# Patient Record
Sex: FEMALE | Race: ASIAN | NOT HISPANIC OR LATINO | Employment: FULL TIME
[De-identification: names, ages, dates, MRNs, and addresses within clinical notes are randomized per-mention and may not be internally consistent; named-entity substitution may affect disease eponyms.]

---

## 2017-10-21 ENCOUNTER — HEALTH MAINTENANCE LETTER (OUTPATIENT)
Age: 22
End: 2017-10-21

## 2017-10-25 DIAGNOSIS — N92.0 MENORRHAGIA WITH REGULAR CYCLE: ICD-10-CM

## 2017-10-25 RX ORDER — ETHYNODIOL DIACETATE AND ETHINYL ESTRADIOL 1 MG-35MCG
KIT ORAL
Qty: 28 TABLET | Refills: 11 | Status: CANCELLED | OUTPATIENT
Start: 2017-10-25

## 2017-10-26 ENCOUNTER — OFFICE VISIT (OUTPATIENT)
Dept: FAMILY MEDICINE | Facility: CLINIC | Age: 22
End: 2017-10-26
Payer: COMMERCIAL

## 2017-10-26 VITALS
RESPIRATION RATE: 14 BRPM | HEART RATE: 64 BPM | SYSTOLIC BLOOD PRESSURE: 110 MMHG | TEMPERATURE: 98.1 F | BODY MASS INDEX: 30.76 KG/M2 | OXYGEN SATURATION: 100 % | WEIGHT: 196 LBS | DIASTOLIC BLOOD PRESSURE: 76 MMHG | HEIGHT: 67 IN

## 2017-10-26 DIAGNOSIS — N30.90 CYSTITIS: Primary | ICD-10-CM

## 2017-10-26 DIAGNOSIS — N92.0 MENORRHAGIA WITH REGULAR CYCLE: ICD-10-CM

## 2017-10-26 LAB
ALBUMIN UR-MCNC: ABNORMAL MG/DL
APPEARANCE UR: ABNORMAL
BACTERIA #/AREA URNS HPF: ABNORMAL /HPF
BETA HCG QUAL IFA URINE: NEGATIVE
BILIRUB UR QL STRIP: NEGATIVE
COLOR UR AUTO: YELLOW
GLUCOSE UR STRIP-MCNC: NEGATIVE MG/DL
HGB UR QL STRIP: ABNORMAL
KETONES UR STRIP-MCNC: NEGATIVE MG/DL
LEUKOCYTE ESTERASE UR QL STRIP: ABNORMAL
NITRATE UR QL: NEGATIVE
NON-SQ EPI CELLS #/AREA URNS LPF: ABNORMAL /LPF
PH UR STRIP: 7 PH (ref 5–7)
RBC #/AREA URNS AUTO: ABNORMAL /HPF
SOURCE: ABNORMAL
SP GR UR STRIP: 1.02 (ref 1–1.03)
UROBILINOGEN UR STRIP-ACNC: 0.2 EU/DL (ref 0.2–1)
WBC #/AREA URNS AUTO: >100 /HPF

## 2017-10-26 PROCEDURE — 84703 CHORIONIC GONADOTROPIN ASSAY: CPT | Performed by: FAMILY MEDICINE

## 2017-10-26 PROCEDURE — 87186 SC STD MICRODIL/AGAR DIL: CPT | Performed by: FAMILY MEDICINE

## 2017-10-26 PROCEDURE — 99214 OFFICE O/P EST MOD 30 MIN: CPT | Performed by: FAMILY MEDICINE

## 2017-10-26 PROCEDURE — 81001 URINALYSIS AUTO W/SCOPE: CPT | Performed by: FAMILY MEDICINE

## 2017-10-26 PROCEDURE — 87086 URINE CULTURE/COLONY COUNT: CPT | Performed by: FAMILY MEDICINE

## 2017-10-26 PROCEDURE — 87088 URINE BACTERIA CULTURE: CPT | Performed by: FAMILY MEDICINE

## 2017-10-26 RX ORDER — CIPROFLOXACIN 250 MG/1
250 TABLET, FILM COATED ORAL 2 TIMES DAILY
Qty: 6 TABLET | Refills: 0 | Status: SHIPPED | OUTPATIENT
Start: 2017-10-26 | End: 2017-11-13

## 2017-10-26 RX ORDER — ETHYNODIOL DIACETATE AND ETHINYL ESTRADIOL 1 MG-35MCG
1 KIT ORAL DAILY
Qty: 28 TABLET | Refills: 11 | Status: SHIPPED | OUTPATIENT
Start: 2017-10-26 | End: 2018-04-12

## 2017-10-26 ASSESSMENT — PATIENT HEALTH QUESTIONNAIRE - PHQ9: SUM OF ALL RESPONSES TO PHQ QUESTIONS 1-9: 5

## 2017-10-26 NOTE — LETTER
October 30, 2017      Ana Mosley  [address] [facility] [address]        Dear ,    We are writing to inform you of your test results.    Your test results fall within the expected range(s) or remain unchanged from previous results.  Please continue with current treatment plan.    Resulted Orders   *UA reflex to Microscopic and Culture ([place] [facility] (except [place] and [place])   Result Value Ref Range    Color Urine Yellow     Appearance Urine Cloudy     Glucose Urine Negative NEG^Negative mg/dL    Bilirubin Urine Negative NEG^Negative    Ketones Urine Negative NEG^Negative mg/dL    Specific Gravity Urine 1.020 1.003 - 1.035    Blood Urine Large (A) NEG^Negative    pH Urine 7.0 5.0 - 7.0 pH    Protein Albumin Urine Trace (A) NEG^Negative mg/dL    Urobilinogen Urine 0.2 0.2 - 1.0 EU/dL    Nitrite Urine Negative NEG^Negative    Leukocyte Esterase Urine Large (A) NEG^Negative    Source Midstream Urine    Beta HCG qual IFA urine - [facility] and [place]   Result Value Ref Range    Beta HCG Qual IFA Urine Negative NEG^Negative      Urine Microscopic   Result Value Ref Range    WBC Urine >100 (A) OTO2^O - 2 /HPF    RBC Urine 25-50 (A) OTO2^O - 2 /HPF    Squamous Epithelial /LPF Urine Few FEW^Few /LPF    Bacteria Urine Few (A) NEG^Negative /HPF   Urine Culture Aerobic Bacterial   Result Value Ref Range    Specimen Description Midstream Urine     Culture Micro 50,000 to 100,000 colonies/mL  Proteus mirabilis   (A)     Culture Micro (A)      10,000 to 50,000 colonies/mL  Strain 2  Proteus mirabilis         If you have any questions or concerns, please call the clinic at the number listed above.       Sincerely,        Yadira Flood, DO

## 2017-10-26 NOTE — MR AVS SNAPSHOT
"              After Visit Summary   10/26/2017    Ana Mosley    MRN: [id number]           Patient Information     Date Of Birth          1995        Visit Information        Provider Department      10/26/2017 10:40 AM Yadira Flood, DO [facility]        Today's Diagnoses     Cystitis    -  1    Menorrhagia with regular cycle           Follow-ups after your visit        Who to contact     If you have questions or need follow up information about today's clinic visit or your schedule please contact [facility] directly at [phone].  Normal or non-critical lab and imaging results will be communicated to you by [employer] Metechhart, letter or phone within 4 business days after the clinic has received the results. If you do not hear from us within 7 days, please contact the clinic through [employer] Metechhart or phone. If you have a critical or abnormal lab result, we will notify you by phone as soon as possible.  Submit refill requests through [email or web address] or call your pharmacy and they will forward the refill request to us. Please allow 3 business days for your refill to be completed.          Additional Information About Your Visit        MyChart Information     [email or web address] lets you send messages to your doctor, view your test results, renew your prescriptions, schedule appointments and more. To sign up, go to [email or web address] . Click on \"Log in\" on the left side of the screen, which will take you to the Welcome page. Then click on \"Sign up Now\" on the right side of the page.     You will be asked to enter the access code listed below, as well as some personal information. Please follow the directions to create your username and password.     Your access code is: [email or web address]  Expires: 2018  7:34 AM     Your access code will  in 90 days. If you need help or a new code, please call your [facility] or [phone].        Care EveryWhere ID     This is your Care EveryWhere " "ID. This could be used by other organizations to access your [facility] medical records  [id number]        Your Vitals Were     Pulse Temperature Respirations Height Pulse Oximetry BMI (Body Mass Index)    64 98.1  F (36.7  C) (Oral) 14 5' 7\" (1.702 m) 100% 30.7 kg/m2       Blood Pressure from Last 3 Encounters:   10/26/17 110/76   08/22/16 118/70   05/18/16 136/70    Weight from Last 3 Encounters:   10/26/17 196 lb (88.9 kg)   08/22/16 206 lb (93.4 kg)   05/18/16 212 lb (96.2 kg) (98 %)*     * Growth percentiles are based on CDC 2-20 Years data.              We Performed the Following     *UA reflex to Microscopic and Culture ([place] [facility] (except [place] and [place])     Beta HCG qual IFA urine - [facility] and [place]     DEPRESSION ACTION PLAN (DAP)     Urine Culture Aerobic Bacterial     Urine Microscopic          Today's Medication Changes          These changes are accurate as of: 10/26/17 11:59 PM.  If you have any questions, ask your nurse or doctor.               Start taking these medicines.        Dose/Directions    ciprofloxacin 250 MG tablet   Commonly known as:  CIPRO   Used for:  Cystitis   Started by:  Yadira Flood DO        Dose:  250 mg   Take 1 tablet (250 mg) by mouth 2 times daily   Quantity:  6 tablet   Refills:  0            Where to get your medicines      These medications [place] [facility] [id number] [place] [address]  [address] [facility] [address]     Phone:  [phone]     ciprofloxacin 250 MG tablet    ethynodiol-ethinyl estradiol 1-35 MG-MCG per tablet                Primary Care Provider Office Phone # Fax #    Susan Rachele Haase, APRN [phone] [address] [facility] [address]        Equal Access to Services     CLARA JACOBSON AH: Armando Zuluaga, wagustavo luqadaha, qaybta kaalmasadia yang, yaw kennedy. [facility] [phone].    ATENCIÓN: Si habla " español, tiene a gary disposición servicios gratuitos de asistencia lingüística. Janel deleon [phone].    We comply with applicable federal civil rights laws and [place] laws. We do not discriminate on the basis of race, color, national origin, age, disability, sex, sexual orientation, or gender identity.            Thank you!     Thank you for choosing [facility]  for your care. Our goal is always to provide you with excellent care. Hearing back from our patients is one way we can continue to improve our services. Please take a few minutes to complete the written survey that you may receive in the mail after your visit with us. Thank you!             Your Updated Medication List - Protect others around you: Learn how to safely use, store and throw away your medicines at www.disposemymeds.org.          This list is accurate as of: 10/26/17 11:59 PM.  Always use your most recent med list.                   Brand Name Dispense Instructions for use Diagnosis    adapalene 0.1 % gel    DIFFERIN    45 g    Apply topically At Bedtime    Acne, unspecified acne type       ciprofloxacin 250 MG tablet    CIPRO    6 tablet    Take 1 tablet (250 mg) by mouth 2 times daily    Cystitis       clindamycin 1 % topical gel    CLINDAMAX    60 g    Apply topically 2 times daily    Acne, unspecified acne type       ethynodiol-ethinyl estradiol 1-35 MG-MCG per tablet    KELNOR    28 tablet    Take 1 tablet by mouth daily    Menorrhagia with regular cycle

## 2017-10-26 NOTE — TELEPHONE ENCOUNTER
Last Office Visit with [facility], [facility] or [facility] prescribing provider: 8/22/2016    Next 5 appointments (look out 90 days)     Oct 26, 2017 10:40 AM CDT   SHORT with Yadira Flood DO   [facility]    [address] [place] [address] [phone]

## 2017-10-26 NOTE — PROGRESS NOTES
SUBJECTIVE:   Ana Mosley is a 22 year old female who presents to clinic today for the following health issues:    URINARY TRACT SYMPTOMS      Duration: 3 days    Description  Dysuria and hematuria    Intensity:  moderate    Accompanying signs and symptoms:  Fever/chills: no   Flank pain no   Nausea and vomiting: no   Vaginal symptoms: none  Abdominal/Pelvic Pain: YES    History  History of frequent UTI's: no   History of kidney stones: no   Sexually Active: YES  Possibility of pregnancy: No    Precipitating or alleviating factors: None    Therapies tried and outcome: none         Patient just got  and recently started sexual activity about 2 weeks ago.    LMP 10/15/17.  Sexually active on 10/23/17.  Out of OCPs for a month.  She is asking for refills today.      Problem list and histories reviewed & adjusted, as indicated.  Additional history: as documented    Patient Active Problem List   Diagnosis     Attention deficit hyperactivity disorder (ADHD)     Obesity     Iron deficiency anemia     Anxiety     Eczema     Hyperlipidemia LDL goal <160     GERD (gastroesophageal reflux disease)     Family history of diabetes mellitus     Vitamin D deficiency disease     Anxiety states     Major depressive disorder, single episode, moderate (H)     Past Surgical History:   Procedure Laterality Date     NO HISTORY OF SURGERY         Social History   Substance Use Topics     Smoking status: Never Smoker     Smokeless tobacco: Never Used     Alcohol use No     Family History   Problem Relation Age of Onset     Lipids Mother      Lipids Father      C.A.D. No family hx of              Reviewed and updated as needed this visit by clinical staff     Reviewed and updated as needed this visit by Provider         ROS:  Constitutional, HEENT, cardiovascular, pulmonary, gi and gu systems are negative, except as otherwise noted.      OBJECTIVE:   /76 (BP Location: Right arm, Patient Position: Chair, Cuff Size: Adult  "Regular)  Pulse 64  Temp 98.1  F (36.7  C) (Oral)  Resp 14  Ht 5' 7\" (1.702 m)  Wt 196 lb (88.9 kg)  SpO2 100%  BMI 30.7 kg/m2  Body mass index is 30.7 kg/(m^2).  GENERAL: healthy, alert and no distress  ABDOMEN: tenderness suprapubic, no organomegaly or masses and bowel sounds normal    Diagnostic Test Results:  Results for orders placed or performed in visit on 10/26/17   *UA reflex to Microscopic and Culture ([facility] (except [place] and [place])   Result Value Ref Range    Color Urine Yellow     Appearance Urine Cloudy     Glucose Urine Negative NEG^Negative mg/dL    Bilirubin Urine Negative NEG^Negative    Ketones Urine Negative NEG^Negative mg/dL    Specific Gravity Urine 1.020 1.003 - 1.035    Blood Urine Large (A) NEG^Negative    pH Urine 7.0 5.0 - 7.0 pH    Protein Albumin Urine Trace (A) NEG^Negative mg/dL    Urobilinogen Urine 0.2 0.2 - 1.0 EU/dL    Nitrite Urine Negative NEG^Negative    Leukocyte Esterase Urine Large (A) NEG^Negative    Source Midstream Urine    Beta HCG qual IFA urine - G and [place]   Result Value Ref Range    Beta HCG Qual IFA Urine Negative NEG^Negative      Urine Microscopic   Result Value Ref Range    WBC Urine >100 (A) OTO2^O - 2 /HPF    RBC Urine 25-50 (A) OTO2^O - 2 /HPF    Squamous Epithelial /LPF Urine Few FEW^Few /LPF    Bacteria Urine Few (A) NEG^Negative /HPF       ASSESSMENT/PLAN:     1. Cystitis  - DEPRESSION ACTION PLAN (DAP)  - *UA reflex to Microscopic and Culture ([place] [facility] (except [place] and [place])  - Beta HCG qual IFA urine - [facility] and [place]  - Urine Microscopic  - Urine Culture Aerobic Bacterial  - ciprofloxacin (CIPRO) 250 MG tablet; Take 1 tablet (250 mg) by mouth 2 times daily  Dispense: 6 tablet; Refill: 0    2. Menorrhagia with regular cycle  - ethynodiol-ethinyl estradiol (KELNOR) 1-35 MG-MCG per tablet; Take 1 tablet by mouth daily  Dispense: 28 tablet; Refill: 11  - Urine Culture Aerobic " Bacterial    Patient to follow up for physical and pap smear by the end of the year    Yadira Flood DO  [facility]

## 2017-10-26 NOTE — LETTER
[facility]  [address] [place] [address]  Phone: [phone]    October 26, 2017        Ana Mosley  [address] [facility] [address]          To whom it may concern:    RE: Ana Mosley    Patient was seen and treated today at our clinic and missed work.    Please contact me for questions or concerns.      Sincerely,        Yadira Flood, DO

## 2017-10-29 LAB
BACTERIA SPEC CULT: ABNORMAL
BACTERIA SPEC CULT: ABNORMAL
SPECIMEN SOURCE: ABNORMAL

## 2017-11-10 ENCOUNTER — OFFICE VISIT (OUTPATIENT)
Dept: FAMILY MEDICINE | Facility: CLINIC | Age: 22
End: 2017-11-10
Payer: COMMERCIAL

## 2017-11-10 VITALS
HEART RATE: 110 BPM | HEIGHT: 67 IN | DIASTOLIC BLOOD PRESSURE: 65 MMHG | BODY MASS INDEX: 30.76 KG/M2 | TEMPERATURE: 97.6 F | RESPIRATION RATE: 14 BRPM | WEIGHT: 196 LBS | SYSTOLIC BLOOD PRESSURE: 83 MMHG

## 2017-11-10 DIAGNOSIS — L03.319 CELLULITIS AND ABSCESS OF TRUNK: Primary | ICD-10-CM

## 2017-11-10 DIAGNOSIS — L02.219 CELLULITIS AND ABSCESS OF TRUNK: Primary | ICD-10-CM

## 2017-11-10 PROCEDURE — 99214 OFFICE O/P EST MOD 30 MIN: CPT | Performed by: FAMILY MEDICINE

## 2017-11-10 RX ORDER — CEPHALEXIN 500 MG/1
500 CAPSULE ORAL 4 TIMES DAILY
Qty: 40 CAPSULE | Refills: 0 | Status: SHIPPED | OUTPATIENT
Start: 2017-11-10 | End: 2018-04-12

## 2017-11-10 NOTE — LETTER
[facility]  [address] [place] [address]  [phone]        November 10, 2017    Ana Mauricio  [address] [facility] [address]              Re Ana Mauricio    Ms Mauricio has been to the doctor. Please excuse her until Tuesday, Nov 14      Sincerely,        Jomar Saucedo MD

## 2017-11-10 NOTE — MR AVS SNAPSHOT
"              After Visit Summary   11/10/2017    Ana Mauricio    MRN: [id number]           Patient Information     Date Of Birth          1995        Visit Information        Provider Department      11/10/2017 11:30 AM Jomar Saucedo MD [facility]        Today's Diagnoses     Cellulitis and abscess of trunk    -  1       Follow-ups after your visit        Your next 10 appointments already scheduled     Nov 13, 2017  2:45 PM CST   Office Visit with Jomar Saucedo MD   [facility] ([facility]    [address] [place] [address] [phone]           Bring a current list of meds and any records pertaining to this visit. For Physicals, please bring immunization records and any forms needing to be filled out. Please arrive 10 minutes early to complete paperwork.              Who to contact     If you have questions or need follow up information about today's clinic visit or your schedule please contact [facility] directly at [phone].  Normal or non-critical lab and imaging results will be communicated to you by DigitalTownhart, letter or phone within 4 business days after the clinic has received the results. If you do not hear from us within 7 days, please contact the clinic through Qubitt or phone. If you have a critical or abnormal lab result, we will notify you by phone as soon as possible.  Submit refill requests through groSolar or call your pharmacy and they will forward the refill request to us. Please allow 3 business days for your refill to be completed.          Additional Information About Your Visit        DigitalTown[employer]uno [employer] Information     groSolar lets you send messages to your doctor, view your test results, renew your prescriptions, schedule appointments and more. To sign up, go to [email or web address] . Click on \"Log in\" on the left side of the screen, which will take you to the Welcome page. Then click on " "\"Sign up Now\" on the right side of the page.     You will be asked to enter the access code listed below, as well as some personal information. Please follow the directions to create your username and password.     Your access code is: [email or web address]  Expires: 2018  6:34 AM     Your access code will  in 90 days. If you need help or a new code, please call your [facility] clinic or [phone].        Care EveryWhere ID     This is your Care EveryWhere ID. This could be used by other organizations to access your [facility] medical records  [id number]        Your Vitals Were     Pulse Temperature Respirations Height Last Period Breastfeeding?    110 97.6  F (36.4  C) (Oral) 14 5' 7\" (1.702 m) 2017 No    BMI (Body Mass Index)                   30.7 kg/m2            Blood Pressure from Last 3 Encounters:   11/10/17 (!) 83/65   10/26/17 110/76   16 118/70    Weight from Last 3 Encounters:   11/10/17 196 lb (88.9 kg)   10/26/17 196 lb (88.9 kg)   16 206 lb (93.4 kg)              Today, you had the following     No orders found for display         Today's Medication Changes          These changes are accurate as of: 11/10/17 12:30 PM.  If you have any questions, ask your nurse or doctor.               Start taking these medicines.        Dose/Directions    cephALEXin 500 MG capsule   Commonly known as:  KEFLEX   Used for:  Cellulitis and abscess of trunk   Started by:  Jomar Saucedo MD        Dose:  500 mg   Take 1 capsule (500 mg) by mouth 4 times daily   Quantity:  40 capsule   Refills:  0            Where to get your medicines      These medications were [facility] [place] [facility] [address]  [address] [place] [address]     Phone:  [phone]     cephALEXin 500 MG capsule                Primary Care Provider Office Phone # Fax #    Susan Rachele Haase, APRN -[phone]  [address]        Equal " Access to Services     [facility]: Hadii jillian osuna ovi Zuluaga, warebeccada luqadaha, qaybta kacruzyaw bryant. So [facility] [phone].    ATENCIÓN: Si habla español, tiene a gary disposición servicios gratuitos de asistencia lingüística. Llame al [phone].    We comply with applicable federal civil rights laws and [place] laws. We do not discriminate on the basis of race, color, national origin, age, disability, sex, sexual orientation, or gender identity.            Thank you!     Thank you for choosing [facility]  for your care. Our goal is always to provide you with excellent care. Hearing back from our patients is one way we can continue to improve our services. Please take a few minutes to complete the written survey that you may receive in the mail after your visit with us. Thank you!             Your Updated Medication List - Protect others around you: Learn how to safely use, store and throw away your medicines at www.disposemymeds.org.          This list is accurate as of: 11/10/17 12:30 PM.  Always use your most recent med list.                   Brand Name Dispense Instructions for use Diagnosis    adapalene 0.1 % gel    DIFFERIN    45 g    Apply topically At Bedtime    Acne, unspecified acne type       cephALEXin 500 MG capsule    KEFLEX    40 capsule    Take 1 capsule (500 mg) by mouth 4 times daily    Cellulitis and abscess of trunk       ciprofloxacin 250 MG tablet    CIPRO    6 tablet    Take 1 tablet (250 mg) by mouth 2 times daily    Cystitis       clindamycin 1 % topical gel    CLINDAMAX    60 g    Apply topically 2 times daily    Acne, unspecified acne type       ethynodiol-ethinyl estradiol 1-35 MG-MCG per tablet    KELNOR    28 tablet    Take 1 tablet by mouth daily    Menorrhagia with regular cycle

## 2017-11-10 NOTE — NURSING NOTE
"Chief Complaint   Patient presents with     Derm Problem     Boil on stomach and fever       Initial Ht 5' 7\" (1.702 m)  Breastfeeding? No Estimated body mass index is 30.7 kg/(m^2) as calculated from the following:    Height as of 10/26/17: 5' 7\" (1.702 m).    Weight as of 10/26/17: 196 lb (88.9 kg).  Medication Reconciliation: complete rt arm Haley Villagran MA      "

## 2017-11-10 NOTE — PROGRESS NOTES
"  SUBJECTIVE:   Ana Mauricio is a 22 year old female who presents to clinic today for the following health issues:    Cellulitis and abscess of trunk  (primary encounter diagnosis): The patient states that she first noticed what she thought was\" body acne\" on her abdomen on Tuesday and popped it. Wednesday it got bigger and now the area is red and painful.  She's never had this before.    Problem list and histories reviewed & adjusted, as indicated.  Additional history: as documented    Reviewed and updated as needed this visit by clinical staff  Tobacco  Allergies  Meds        Past Medical History:   Diagnosis Date     Attention deficit disorder without mention of hyperactivity     not on meds now, tried several     Hyperlipidemia LDL goal <160 12/31/2011    recent cystitis symptoms have resolved with treatment    Past Surgical History:   Procedure Laterality Date     NO HISTORY OF SURGERY         Family History   Problem Relation Age of Onset     Lipids Mother      Lipids Father      C.A.D. No family hx of        Social History   Substance Use Topics     Smoking status: Never Smoker     Smokeless tobacco: Never Used     Alcohol use No       Reviewed and updated as needed this visit by Provider         ROS:  No fever no GI or  symptoms    OBJECTIVE:     BP (!) 83/65 (BP Location: Right arm, Patient Position: Chair, Cuff Size: Adult Regular)  Pulse 110  Temp 97.6  F (36.4  C) (Oral)  Resp 14  Ht 1.702 m (5' 7\")  Wt 88.9 kg (196 lb)  LMP 11/05/2017  Breastfeeding? No  BMI 30.7 kg/m2  Body mass index is 30.7 kg/(m^2).    Exam  Right lower quadrant of abdomen with large subcutaneous induration. In the center of this is what appears to be a 1 cm area of desquamation. There is no fluctuance this is palpably tender      ASSESSMENT/PLAN:   ASSESSMENT / PLAN:  (L03.319,  L02.219) Cellulitis and abscess of trunk  (primary encounter diagnosis)  Comment: I suspect an underlying sebaceous cyst was initiating " cause  Plan: cephALEXin (KEFLEX) 500 MG capsule warm soaks    RTC 3 days, anticipate development of central liquefaction to be drained. TD Up-to-date    The information in this document, created by the medical scribe for me, accurately reflects the services I personally performed and the decisions made by me. I have reviewed and approved this document for accuracy prior to leaving the patient care area.  Jomar Saucedo MD November 10, 2017 11:45 AM    Jomar Saucedo MD  [facility]

## 2017-11-12 ENCOUNTER — TELEPHONE (OUTPATIENT)
Dept: FAMILY MEDICINE | Facility: CLINIC | Age: 22
End: 2017-11-12

## 2017-11-12 NOTE — TELEPHONE ENCOUNTER
Clinic Action Needed: No/FYI  Reason for Call: Ana was seen in clinic on November 10th and dx with cellulitis and abscess of trunk and possible sebaceous cyst.  She was started on Keflex 500 mg QID and was to return to clinic for possible draining of cyst on Monday.  Early this morning she awoke to find that the abscess/cyst appears to have burst and is draining a serosanguineous fluid.  She denies increased, pain, advancing redness or fever, however reports that the skin where abscess is turning black. Paged on call provider for [facility] to speak to me at [facility].  Dr. Flood is on call, page sent @ 8:34 am. 2ND page sent @ 8:46 am. Dr. Flood advised that she should keep appointment for tomorrow, get gauze dressing to put over draining area to soak up fluid, warm pack to encourage draining, stay on abx as prescribed.  If the area becomes more painful, advancing redness or develops fever get into UC today.  Called Ana with instructions from Dr. Flood, she appears to understand directives and agrees with plan.    Routed to: KASEY Gaona, RN  [facility]

## 2017-11-13 ENCOUNTER — OFFICE VISIT (OUTPATIENT)
Dept: FAMILY MEDICINE | Facility: CLINIC | Age: 22
End: 2017-11-13
Payer: COMMERCIAL

## 2017-11-13 VITALS
SYSTOLIC BLOOD PRESSURE: 111 MMHG | TEMPERATURE: 98.2 F | HEART RATE: 80 BPM | RESPIRATION RATE: 18 BRPM | BODY MASS INDEX: 30.45 KG/M2 | HEIGHT: 67 IN | DIASTOLIC BLOOD PRESSURE: 76 MMHG | WEIGHT: 194 LBS

## 2017-11-13 DIAGNOSIS — L03.319 CELLULITIS AND ABSCESS OF TRUNK: Primary | ICD-10-CM

## 2017-11-13 DIAGNOSIS — L02.219 CELLULITIS AND ABSCESS OF TRUNK: Primary | ICD-10-CM

## 2017-11-13 DIAGNOSIS — L72.3 SEBACEOUS CYST: ICD-10-CM

## 2017-11-13 PROCEDURE — 99213 OFFICE O/P EST LOW 20 MIN: CPT | Performed by: FAMILY MEDICINE

## 2017-11-13 RX ORDER — HYDROCODONE BITARTRATE AND ACETAMINOPHEN 5; 325 MG/1; MG/1
1 TABLET ORAL EVERY 6 HOURS PRN
Qty: 12 TABLET | Refills: 0 | Status: SHIPPED | OUTPATIENT
Start: 2017-11-13 | End: 2017-12-16

## 2017-11-13 NOTE — PROGRESS NOTES
"  SUBJECTIVE:   Ana Mauricio is a 22 year old female who presents to clinic today for the following health issues:      The patient says the cyst \" popped \" yesterday.      Problem list and histories reviewed & adjusted, as indicated.  Additional history: as documented        Reviewed and updated as needed this visit by clinical staff  Tobacco  Allergies  Meds  Med Hx  Surg Hx  Fam Hx  Soc Hx       Past Medical History:   Diagnosis Date     Attention deficit disorder without mention of hyperactivity     not on meds now, tried several     Cellulitis and abscess of trunk 11/10/2017     Hyperlipidemia LDL goal <160 12/31/2011       Past Surgical History:   Procedure Laterality Date     NO HISTORY OF SURGERY         Family History   Problem Relation Age of Onset     Lipids Mother      Lipids Father      C.A.D. No family hx of        Social History   Substance Use Topics     Smoking status: Never Smoker     Smokeless tobacco: Never Used     Alcohol use No       Reviewed and updated as needed this visit by Provider         ROS: No fevers    This document serves as a record of the services and decisions personally performed and made by Jomar Saucedo MD. It was created on his behalf by Tyra Heard, a trained medical scribe.  The creation of this document is based on the scribe's personal observations and the provider's statements to the medical scribe.  Tyra Heard, November 13, 2017 3:05 PM    OBJECTIVE:     /76 (BP Location: Right arm, Patient Position: Chair, Cuff Size: Adult Large)  Pulse 80  Temp 98.2  F (36.8  C) (Oral)  Resp 18  Ht 1.702 m (5' 7\")  Wt 88 kg (194 lb)  LMP 11/05/2017  BMI 30.38 kg/m2  Body mass index is 30.38 kg/(m^2).    Skin: Induration RLQ abdomen with central hole, draining liquified sebaceous material    Procedure:     Lesion has spontaneously drained, skin anesthetized with marcaine, then liquefied typical sebaceous material removed. Wound packed " dressed      ASSESSMENT/PLAN:     ASSESSMENT / PLAN:  (L03.319,  L02.219) Cellulitis and abscess of trunk  (primary encounter diagnosis)  Comment: *improved  Plan: HYDROcodone-acetaminophen (NORCO) 5-325 MG per         Tablet. Wound care discussed           (L72.3) Sebaceous cyst  Comment: yonatan history discussed  Plan:       RTC as needed, and upon recurrence    Jomar Saucedo MD                  The information in this document, created by the medical scribe for me, accurately reflects the services I personally performed and the decisions made by me. I have reviewed and approved this document for accuracy prior to leaving the patient care area.  Jomar Saucedo MD November 13, 2017 3:05 PM    Jomar Saucedo MD  [facility]

## 2017-11-13 NOTE — NURSING NOTE
"Chief Complaint   Patient presents with     Derm Problem     Fu on RLQ boil        Initial /76 (BP Location: Right arm, Patient Position: Chair, Cuff Size: Adult Large)  Pulse 80  Temp 98.2  F (36.8  C) (Oral)  Resp 18  Ht 5' 7\" (1.702 m)  Wt 194 lb (88 kg)  LMP 11/05/2017  BMI 30.38 kg/m2 Estimated body mass index is 30.38 kg/(m^2) as calculated from the following:    Height as of this encounter: 5' 7\" (1.702 m).    Weight as of this encounter: 194 lb (88 kg).  Medication Reconciliation: complete rt arm Haley Villagran MA        "

## 2017-11-13 NOTE — MR AVS SNAPSHOT
"              After Visit Summary   2017    Ana Mauricio    MRN: [id number]           Patient Information     Date Of Birth          1995        Visit Information        Provider Department      2017 2:45 PM Jomar Saucedo MD [facility]        Today's Diagnoses     Cellulitis and abscess of trunk    -  1    Sebaceous cyst           Follow-ups after your visit        Who to contact     If you have questions or need follow up information about today's clinic visit or your schedule please contact [facility] directly at [phone].  Normal or non-critical lab and imaging results will be communicated to you by Trippy Bandzhart, letter or phone within 4 business days after the clinic has received the results. If you do not hear from us within 7 days, please contact the clinic through Trippy Bandzhart or phone. If you have a critical or abnormal lab result, we will notify you by phone as soon as possible.  Submit refill requests through XMarket or call your pharmacy and they will forward the refill request to us. Please allow 3 business days for your refill to be completed.          Additional Information About Your Visit        MyChart Information     XMarket lets you send messages to your doctor, view your test results, renew your prescriptions, schedule appointments and more. To sign up, go to [email or web address] . Click on \"Log in\" on the left side of the screen, which will take you to the Welcome page. Then click on \"Sign up Now\" on the right side of the page.     You will be asked to enter the access code listed below, as well as some personal information. Please follow the directions to create your username and password.     Your access code is: [email or web address]  Expires: 2018  6:34 AM     Your access code will  in 90 days. If you need help or a new code, please call your [facility] or [phone].        Care EveryWhere ID     This is your Care EveryWhere " "ID. This could be used by other organizations to access your [facility] medical records  [id number]        Your Vitals Were     Pulse Temperature Respirations Height Last Period BMI (Body Mass Index)    80 98.2  F (36.8  C) (Oral) 18 5' 7\" (1.702 m) 11/05/2017 30.38 kg/m2       Blood Pressure from Last 3 Encounters:   11/13/17 111/76   11/10/17 (!) 83/65   10/26/17 110/76    Weight from Last 3 Encounters:   11/13/17 194 lb (88 kg)   11/10/17 196 lb (88.9 kg)   10/26/17 196 lb (88.9 kg)              Today, you had the following     No orders found for display         Today's Medication Changes          These changes are accurate as of: 11/13/17 11:59 PM.  If you have any questions, ask your nurse or doctor.               Start taking these medicines.        Dose/Directions    HYDROcodone-acetaminophen 5-325 MG per tablet   Commonly known as:  NORCO   Used for:  Cellulitis and abscess of trunk   Started by:  Jomar Saucedo MD        Dose:  1 tablet   Take 1 tablet by mouth every 6 hours as needed for moderate to severe pain   Quantity:  12 tablet   Refills:  0            Where to get your medicines      Some of these will need a paper prescription and others can be bought over the counter.  Ask your nurse if you have questions.     Bring a paper prescription for each of these medications     HYDROcodone-acetaminophen 5-325 MG per tablet                Primary Care Provider Office Phone # Fax #    Maggie Rachele Haase, APRN [phone] [address] [facility] [address]        Equal Access to Services     [facility]: Hadii jillian osuna hadasho Soomaali, waaxda luqadaha, qaybta kaalmada adeeglaurie, yaw idiin hayaan adeeg kharash la'aan . So [facility] [phone].    ATENCIÓN: Si habla español, tiene a gary disposición servicios gratuitos de asistencia lingüística. Llame al [phone].    We comply with applicable federal civil rights laws and [place] laws. We do not discriminate on the basis of " race, color, national origin, age, disability, sex, sexual orientation, or gender identity.            Thank you!     Thank you for choosing [facility]  for your care. Our goal is always to provide you with excellent care. Hearing back from our patients is one way we can continue to improve our services. Please take a few minutes to complete the written survey that you may receive in the mail after your visit with us. Thank you!             Your Updated Medication List - Protect others around you: Learn how to safely use, store and throw away your medicines at www.disposemymeds.org.          This list is accurate as of: 11/13/17 11:59 PM.  Always use your most recent med list.                   Brand Name Dispense Instructions for use Diagnosis    adapalene 0.1 % gel    DIFFERIN    45 g    Apply topically At Bedtime    Acne, unspecified acne type       cephALEXin 500 MG capsule    KEFLEX    40 capsule    Take 1 capsule (500 mg) by mouth 4 times daily    Cellulitis and abscess of trunk       clindamycin 1 % topical gel    CLINDAMAX    60 g    Apply topically 2 times daily    Acne, unspecified acne type       ethynodiol-ethinyl estradiol 1-35 MG-MCG per tablet    KELNOR    28 tablet    Take 1 tablet by mouth daily    Menorrhagia with regular cycle       HYDROcodone-acetaminophen 5-325 MG per tablet    NORCO    12 tablet    Take 1 tablet by mouth every 6 hours as needed for moderate to severe pain    Cellulitis and abscess of trunk

## 2017-12-16 ENCOUNTER — OFFICE VISIT (OUTPATIENT)
Dept: FAMILY MEDICINE | Facility: CLINIC | Age: 22
End: 2017-12-16
Payer: COMMERCIAL

## 2017-12-16 VITALS
DIASTOLIC BLOOD PRESSURE: 66 MMHG | OXYGEN SATURATION: 98 % | HEART RATE: 83 BPM | SYSTOLIC BLOOD PRESSURE: 110 MMHG | WEIGHT: 196 LBS | RESPIRATION RATE: 18 BRPM | HEIGHT: 67 IN | TEMPERATURE: 97.8 F | BODY MASS INDEX: 30.76 KG/M2

## 2017-12-16 DIAGNOSIS — Z11.3 SCREEN FOR STD (SEXUALLY TRANSMITTED DISEASE): ICD-10-CM

## 2017-12-16 DIAGNOSIS — D50.9 IRON DEFICIENCY ANEMIA, UNSPECIFIED IRON DEFICIENCY ANEMIA TYPE: ICD-10-CM

## 2017-12-16 DIAGNOSIS — E78.5 HYPERLIPIDEMIA LDL GOAL <160: ICD-10-CM

## 2017-12-16 DIAGNOSIS — Z00.00 ROUTINE GENERAL MEDICAL EXAMINATION AT A HEALTH CARE FACILITY: Primary | ICD-10-CM

## 2017-12-16 DIAGNOSIS — E55.9 VITAMIN D DEFICIENCY DISEASE: ICD-10-CM

## 2017-12-16 DIAGNOSIS — F32.1 MAJOR DEPRESSIVE DISORDER, SINGLE EPISODE, MODERATE (H): ICD-10-CM

## 2017-12-16 DIAGNOSIS — Z11.51 SPECIAL SCREENING EXAMINATION FOR HUMAN PAPILLOMAVIRUS (HPV): ICD-10-CM

## 2017-12-16 DIAGNOSIS — Z83.3 FAMILY HISTORY OF DIABETES MELLITUS: ICD-10-CM

## 2017-12-16 LAB
ALBUMIN SERPL-MCNC: 3.9 G/DL (ref 3.4–5)
ALP SERPL-CCNC: 95 U/L (ref 40–150)
ALT SERPL W P-5'-P-CCNC: 27 U/L (ref 0–50)
ANION GAP SERPL CALCULATED.3IONS-SCNC: 7 MMOL/L (ref 3–14)
AST SERPL W P-5'-P-CCNC: 23 U/L (ref 0–45)
BILIRUB SERPL-MCNC: 0.3 MG/DL (ref 0.2–1.3)
BUN SERPL-MCNC: 10 MG/DL (ref 7–30)
CALCIUM SERPL-MCNC: 9.2 MG/DL (ref 8.5–10.1)
CHLORIDE SERPL-SCNC: 104 MMOL/L (ref 94–109)
CHOLEST SERPL-MCNC: 231 MG/DL
CO2 SERPL-SCNC: 27 MMOL/L (ref 20–32)
CREAT SERPL-MCNC: 0.66 MG/DL (ref 0.52–1.04)
ERYTHROCYTE [DISTWIDTH] IN BLOOD BY AUTOMATED COUNT: 15.6 % (ref 10–15)
GFR SERPL CREATININE-BSD FRML MDRD: >90 ML/MIN/1.7M2
GLUCOSE SERPL-MCNC: 80 MG/DL (ref 70–99)
HCT VFR BLD AUTO: 40.4 % (ref 35–47)
HDLC SERPL-MCNC: 52 MG/DL
HGB BLD-MCNC: 12.6 G/DL (ref 11.7–15.7)
LDLC SERPL CALC-MCNC: 162 MG/DL
MCH RBC QN AUTO: 24.5 PG (ref 26.5–33)
MCHC RBC AUTO-ENTMCNC: 31.2 G/DL (ref 31.5–36.5)
MCV RBC AUTO: 79 FL (ref 78–100)
NONHDLC SERPL-MCNC: 179 MG/DL
PLATELET # BLD AUTO: 309 10E9/L (ref 150–450)
POTASSIUM SERPL-SCNC: 3.9 MMOL/L (ref 3.4–5.3)
PROT SERPL-MCNC: 7.9 G/DL (ref 6.8–8.8)
RBC # BLD AUTO: 5.14 10E12/L (ref 3.8–5.2)
SODIUM SERPL-SCNC: 138 MMOL/L (ref 133–144)
TRIGL SERPL-MCNC: 86 MG/DL
TSH SERPL DL<=0.005 MIU/L-ACNC: 2.48 MU/L (ref 0.4–4)
WBC # BLD AUTO: 6.4 10E9/L (ref 4–11)

## 2017-12-16 PROCEDURE — 99395 PREV VISIT EST AGE 18-39: CPT | Performed by: PHYSICIAN ASSISTANT

## 2017-12-16 PROCEDURE — 85027 COMPLETE CBC AUTOMATED: CPT | Performed by: PHYSICIAN ASSISTANT

## 2017-12-16 PROCEDURE — G0145 SCR C/V CYTO,THINLAYER,RESCR: HCPCS | Performed by: PHYSICIAN ASSISTANT

## 2017-12-16 PROCEDURE — 36415 COLL VENOUS BLD VENIPUNCTURE: CPT | Performed by: PHYSICIAN ASSISTANT

## 2017-12-16 PROCEDURE — 87491 CHLMYD TRACH DNA AMP PROBE: CPT | Performed by: PHYSICIAN ASSISTANT

## 2017-12-16 PROCEDURE — 84443 ASSAY THYROID STIM HORMONE: CPT | Performed by: PHYSICIAN ASSISTANT

## 2017-12-16 PROCEDURE — 80061 LIPID PANEL: CPT | Performed by: PHYSICIAN ASSISTANT

## 2017-12-16 PROCEDURE — 82306 VITAMIN D 25 HYDROXY: CPT | Performed by: PHYSICIAN ASSISTANT

## 2017-12-16 PROCEDURE — 87591 N.GONORRHOEAE DNA AMP PROB: CPT | Performed by: PHYSICIAN ASSISTANT

## 2017-12-16 PROCEDURE — 80053 COMPREHEN METABOLIC PANEL: CPT | Performed by: PHYSICIAN ASSISTANT

## 2017-12-16 NOTE — LETTER
December 29, 2017      Ana ARCHANA Mauricio  [address] [facility] [address]    Dear ,      I am happy to inform you that your recent cervical cancer screening test (PAP smear) was normal.      Preventative screenings such as this help to ensure your health for years to come. You should repeat a pap smear in 3 years, unless otherwise directed.      You will still need to return to the clinic every year for your annual exam and other preventive tests.     Please contact the clinic at [phone] if you have further questions.       Sincerely,      Ramona Ann Aaseby-Aguilera, PA-C/alba

## 2017-12-16 NOTE — NURSING NOTE
"Chief Complaint   Patient presents with     Physical     possible pap     Medication Request     have, but not currently using     Blood Draw     IS fasting       Initial /66 (BP Location: Right arm, Patient Position: Brian, Cuff Size: Adult Regular)  Pulse 83  Temp 97.8  F (36.6  C) (Oral)  Resp 18  Ht 5' 7\" (1.702 m)  Wt 196 lb (88.9 kg)  LMP 12/11/2017 (Approximate)  SpO2 98%  Breastfeeding? No  BMI 30.7 kg/m2 Estimated body mass index is 30.7 kg/(m^2) as calculated from the following:    Height as of this encounter: 5' 7\" (1.702 m).    Weight as of this encounter: 196 lb (88.9 kg).  Medication Reconciliation: guadalupe Still CMA      "

## 2017-12-16 NOTE — MR AVS SNAPSHOT
After Visit Summary   12/16/2017    Ana Mauricio    MRN: [id number]           Patient Information     Date Of Birth          1995        Visit Information        Provider Department      12/16/2017 8:30 AM Aaseby-Aguilera, Ramona Ann, PA-C [facility]        Today's Diagnoses     Routine general medical examination at a health care facility    -  1    Special screening examination for human papillomavirus (HPV)        Screen for STD (sexually transmitted disease)          Care Instructions      Preventive Health Recommendations  Female Ages 18 to 25     Yearly exam:     See your health care provider every year in order to  o Review health changes.   o Discuss preventive care.    o Review your medicines if your doctor has prescribed any.      You should be tested each year for STDs (sexually transmitted diseases).       After age 20, talk to your provider about how often you should have cholesterol testing.      Starting at age 21, get a Pap test every three years. If you have an abnormal result, your doctor may have you test more often.      If you are at risk for diabetes, you should have a diabetes test (fasting glucose).     Shots:     Get a flu shot each year.     Get a tetanus shot every 10 years.     Consider getting the shot (vaccine) that prevents cervical cancer (Gardasil).    Nutrition:     Eat at least 5 servings of fruits and vegetables each day.    Eat whole-grain bread, whole-wheat pasta and brown rice instead of white grains and rice.    Talk to your provider about Calcium and Vitamin D.     Lifestyle    Exercise at least 150 minutes a week each week (30 minutes a day, 5 days a week). This will help you control your weight and prevent disease.    Limit alcohol to one drink per day.    No smoking.     Wear sunscreen to prevent skin cancer.    See your dentist every six months for an exam and cleaning.          Follow-ups after your visit        Who to contact      "If you have questions or need follow up information about today's clinic visit or your schedule please contact [facility] directly at [phone].  Normal or non-critical lab and imaging results will be communicated to you by MyChart, letter or phone within 4 business days after the clinic has received the results. If you do not hear from us within 7 days, please contact the clinic through Healthy Labshart or phone. If you have a critical or abnormal lab result, we will notify you by phone as soon as possible.  Submit refill requests through Tapestry or call your pharmacy and they will forward the refill request to us. Please allow 3 business days for your refill to be completed.          Additional Information About Your Visit        Healthy LabsharGruvi Information     Tapestry lets you send messages to your doctor, view your test results, renew your prescriptions, schedule appointments and more. To sign up, go to [email or web address] . Click on \"Log in\" on the left side of the screen, which will take you to the Welcome page. Then click on \"Sign up Now\" on the right side of the page.     You will be asked to enter the access code listed below, as well as some personal information. Please follow the directions to create your username and password.     Your access code is: [email or web address]  Expires: 2018  6:34 AM     Your access code will  in 90 days. If you need help or a new code, please call your [place] clinic or [phone].        Care EveryWhere ID     This is your Care EveryWhere ID. This could be used by other organizations to access your [place] medical records  [id number]        Your Vitals Were     Pulse Temperature Respirations Height Last Period Pulse Oximetry    83 97.8  F (36.6  C) (Oral) 18 5' 7\" (1.702 m) 2017 (Approximate) 98%    Breastfeeding? BMI (Body Mass Index)                No 30.7 kg/m2           Blood Pressure from Last 3 Encounters:   17 110/66   17 111/76 "   11/10/17 (!) 83/65    Weight from Last 3 Encounters:   12/16/17 196 lb (88.9 kg)   11/13/17 194 lb (88 kg)   11/10/17 196 lb (88.9 kg)              We Performed the Following     CHLAMYDIA TRACHOMATIS PCR     NEISSERIA GONORRHOEA PCR     Pap imaged thin layer screen only - recommended age 21 - 24 years        Primary Care Provider Office Phone # Fax #    Susan Rachele Haase, APRN [phone] [address] [address] [facility] [address]        Equal Access to Services     [facility]: Hadii aad ku hadasho Soomaali, waaxda luqadaha, qaybta kaalmada adeegyada, waxpat rincon . So [facility] [phone].    ATENCIÓN: Si habla español, tiene a gary disposición servicios gratuitos de asistencia lingüística. Llame al [phone].    We comply with applicable federal civil rights laws and [place] laws. We do not discriminate on the basis of race, color, national origin, age, disability, sex, sexual orientation, or gender identity.            Thank you!     Thank you for choosing [facility]  for your care. Our goal is always to provide you with excellent care. Hearing back from our patients is one way we can continue to improve our services. Please take a few minutes to complete the written survey that you may receive in the mail after your visit with us. Thank you!             Your Updated Medication List - Protect others around you: Learn how to safely use, store and throw away your medicines at www.disposemymeds.org.          This list is accurate as of: 12/16/17  9:01 AM.  Always use your most recent med list.                   Brand Name Dispense Instructions for use Diagnosis    cephALEXin 500 MG capsule    KEFLEX    40 capsule    Take 1 capsule (500 mg) by mouth 4 times daily    Cellulitis and abscess of trunk       ethynodiol-ethinyl estradiol 1-35 MG-MCG per tablet    KELNOR    28 tablet    Take 1 tablet by mouth daily    Menorrhagia with regular cycle

## 2017-12-16 NOTE — LETTER
"                 [facility]          [address] [place] [address] [phone]      Ana ARCHANA Mauricio  [address]  [facility] [address]      Dear Ana,    The results of your recent total cholesterol test were elevated.  Your total cholesterol should be less than 200.    The primary goal of therapy is the LDL or \"bad\" cholesterol.  Your LDL level was elevated.  Your LDL goal based on risk factors (i.e. smoking, family history, high blood pressure, low HDL cholesterol) and age is 160.    Your HDL, or \"good\" cholesterol is normal.  Your goal is an HDL level above 40 if you are male and 50 if you are female triglycerides are another cholesterol component that is associated with heart disease.  Normal triglycerides are less than 150. Your triglyceride level is normal.    I would recommend: increase daily fiber intake, weight loss, increase physical activity with a goal of 150 minutes moderate exercise weekly, decrease saturated fat intake to less than 7% of total calories and repeat fasting lipids and liver tests in 12 months.    Your vitamin D level was low.  I have called in a script for vitamin D3.  Please take 1 tablet WEEKLYx 8 weeks.   After you finish that script please supplement with vitamin D3 year around at 1000 to 2000 units daily     Your hemoglobin is in normal range but I would recommend you supplement with over the counter iron supplement daily also.     The rest of your labs are within acceptable limits.  Enclosed is a copy of the results.      Thank you for choosing [facility]. We appreciate the opportunity to serve you and look forward to supporting your healthcare needs in the future.    If you have any questions or concerns, please call me or my nurse at [phone].        Sincerely,      Elissa Beasley PA-C/[facility] Team " Coordinator    Results for orders placed or performed in visit on 12/16/17   Pap imaged thin layer screen only - recommended age 21 - 24 years   Result Value Ref Range    PAP NIL     Copath Report         Patient Name: JS SALINAS  MR#: [id number]  Specimen #: [id number]  Collected: 12/16/2017  Received: 12/18/2017  Reported: 12/19/2017 13:23  Ordering Phy(s): RAMONA ANN AASEBY-AGUILERA    For improved result formatting, select 'View Enhanced Report Format'  under Linked Documents section.    SPECIMEN/STAIN PROCESS:  Pap imaged thin layer prep screening (Surepath, FocalPoint with guided  screening)       Pap-Cyto x 1    SOURCE: Cervical, endocervical  ----------------------------------------------------------------   Pap imaged thin layer prep screening (Surepath, FocalPoint with guided  screening)  SPECIMEN ADEQUACY:  Satisfactory for evaluation.  -Transformation zone component absent.    CYTOLOGIC INTERPRETATION:    Negative for intraepithelial lesion or malignancy    Electronically signed out by:  DAVON Hernández (ASCP)    Processed and screened at [facility]    CLINICAL HISTORY:  LMP: 12/11/17    Papanicolaou Test Limita tions:  Cervical cytology is a screening test  with limited sensitivity; regular screening is critical for cancer  prevention; Pap tests are primarily effective for the  diagnosis/prevention of squamous cell carcinoma, not adenocarcinomas or  other cancers.    TESTING LAB LOCATION:  [facility] [address] [place]  [address] [phone]    COLLECTION SITE:  Client:  [facility]  Location: [facility] (R)     CBC with platelets   Result Value Ref Range    WBC 6.4 4.0 - 11.0 10e9/L    RBC Count 5.14 3.8 - 5.2 10e12/L    Hemoglobin 12.6 11.7 - 15.7 g/dL    Hematocrit 40.4 35.0 - 47.0 %    MCV 79 78 - 100 fl    MCH 24.5 (L) 26.5 - 33.0 pg    MCHC 31.2 (L) 31.5 - 36.5 g/dL    RDW 15.6 (H) 10.0 - 15.0  %    Platelet Count 309 150 - 450 10e9/L   TSH with free T4 reflex   Result Value Ref Range    TSH 2.48 0.40 - 4.00 mU/L   Comprehensive metabolic panel   Result Value Ref Range    Sodium 138 133 - 144 mmol/L    Potassium 3.9 3.4 - 5.3 mmol/L    Chloride 104 94 - 109 mmol/L    Carbon Dioxide 27 20 - 32 mmol/L    Anion Gap 7 3 - 14 mmol/L    Glucose 80 70 - 99 mg/dL    Urea Nitrogen 10 7 - 30 mg/dL    Creatinine 0.66 0.52 - 1.04 mg/dL    GFR Estimate >90 >60 mL/min/1.7m2    GFR Estimate If Black >90 >60 mL/min/1.7m2    Calcium 9.2 8.5 - 10.1 mg/dL    Bilirubin Total 0.3 0.2 - 1.3 mg/dL    Albumin 3.9 3.4 - 5.0 g/dL    Protein Total 7.9 6.8 - 8.8 g/dL    Alkaline Phosphatase 95 40 - 150 U/L    ALT 27 0 - 50 U/L    AST 23 0 - 45 U/L   Lipid panel reflex to direct LDL Fasting   Result Value Ref Range    Cholesterol 231 (H) <200 mg/dL    Triglycerides 86 <150 mg/dL    HDL Cholesterol 52 >49 mg/dL    LDL Cholesterol Calculated 162 (H) <100 mg/dL    Non HDL Cholesterol 179 (H) <130 mg/dL   Vitamin D Deficiency   Result Value Ref Range    Vitamin D Deficiency screening 18 (L) 20 - 75 ug/L   NEISSERIA GONORRHOEA PCR   Result Value Ref Range    Specimen Descrip Endocervical     N Gonorrhea PCR Negative NEG^Negative   CHLAMYDIA TRACHOMATIS PCR   Result Value Ref Range    Specimen Description Endocervical     Chlamydia Trachomatis PCR Negative NEG^Negative

## 2017-12-16 NOTE — PROGRESS NOTES
SUBJECTIVE:   CC: Ana Mauricio is an 22 year old woman who presents for preventive health visit.     Physical   Annual:     Getting at least 3 servings of Calcium per day::  Yes    Bi-annual eye exam::  Yes    Dental care twice a year::  Yes    Sleep apnea or symptoms of sleep apnea::  None    Diet::  Regular (no restrictions)    Taking medications regularly::  Not Applicable    Additional concerns today::  No                      Today's PHQ-2 Score: PHQ-2 ( 1999 [employer]) 12/16/2017   Q1: Little interest or pleasure in doing things 0   Q2: Feeling down, depressed or hopeless 0   PHQ-2 Score 0   Q1: Little interest or pleasure in doing things Not at all   Q2: Feeling down, depressed or hopeless Not at all   PHQ-2 Score 0       Abuse: Current or Past(Physical, Sexual or Emotional)- NOT APPLICABLE  Do you feel safe in your environment - Yes    Social History   Substance Use Topics     Smoking status: Never Smoker     Smokeless tobacco: Never Used     Alcohol use No     Alcohol Use 12/16/2017   If you drink alcohol, do you typically have greater than 3 drinks per day OR greater than 7 drinks per week?   No       Reviewed orders with patient.  Reviewed health maintenance and updated orders accordingly - Yes  BP Readings from Last 3 Encounters:   12/16/17 110/66   11/13/17 111/76   11/10/17 (!) 83/65    Wt Readings from Last 3 Encounters:   12/16/17 196 lb (88.9 kg)   11/13/17 194 lb (88 kg)   11/10/17 196 lb (88.9 kg)                  Recent Labs   Lab Test  05/02/16   1106  12/30/13   0950  08/15/13   1007   01/17/11   1444   A1C  5.9  5.5   --    --    --    LDL  133*  131*  136*   < >  132*   HDL  45*  43*  44*   < >  44*   TRIG  115*  72  133   < >  63   ALT  29   --    --    --   11   CR  0.76  0.84   --    < >  0.74*   GFRESTIMATED  >90  Non  GFR Calc    89   --    < >  GFR not calculated, patient <16 years old.   GFRESTBLACK  >90   GFR Calc    >90   --    < >  GFR not  calculated, patient <16 years old.   POTASSIUM  4.2  4.1   --    < >  4.3   TSH  1.93  3.05   --    < >  1.21    < > = values in this interval not displayed.            Mammogram not appropriate for this patient based on age.    Pertinent mammograms are reviewed under the imaging tab.  History of abnormal Pap smear: NO - age 21-29 PAP every 3 years recommended    Reviewed and updated as needed this visit by clinical staff         Reviewed and updated as needed this visit by Provider          Review of Systems  C: NEGATIVE for fever, chills, change in weight  I: NEGATIVE for worrisome rashes, moles or lesions  E: NEGATIVE for vision changes or irritation  ENT: NEGATIVE for ear, mouth and throat problems  R: NEGATIVE for significant cough or SOB  B: NEGATIVE for masses, tenderness or discharge  CV: NEGATIVE for chest pain, palpitations or peripheral edema  GI: NEGATIVE for nausea, abdominal pain, heartburn, or change in bowel habits  : NEGATIVE for unusual urinary or vaginal symptoms. Periods are regular.  M: NEGATIVE for significant arthralgias or myalgia  N: NEGATIVE for weakness, dizziness or paresthesias  E: NEGATIVE for temperature intolerance, skin/hair changes  P: NEGATIVE for changes in mood or affect     OBJECTIVE:   There were no vitals taken for this visit.  Physical Exam  GENERAL: healthy, alert and no distress  EYES: Eyes grossly normal to inspection, PERRL and conjunctivae and sclerae normal  HENT: ear canals and TM's normal, nose and mouth without ulcers or lesions  NECK: no adenopathy, no asymmetry, masses, or scars and thyroid normal to palpation  RESP: lungs clear to auscultation - no rales, rhonchi or wheezes  BREAST: normal without masses, tenderness or nipple discharge and no palpable axillary masses or adenopathy  CV: regular rate and rhythm, normal S1 S2, no S3 or S4, no murmur, click or rub, no peripheral edema and peripheral pulses strong  ABDOMEN: soft, nontender, no hepatosplenomegaly, no  "masses and bowel sounds normal   (female): normal female external genitalia, normal urethral meatus, vaginal mucosa pink, moist, well rugated, and normal cervix/adnexa/uterus without masses or discharge  MS: no gross musculoskeletal defects noted, no edema  SKIN: no suspicious lesions or rashes  NEURO: Normal strength and tone, mentation intact and speech normal  PSYCH: mentation appears normal, affect normal/bright    ASSESSMENT/PLAN:   1. Routine general medical examination at a health care facility      2. Special screening examination for human papillomavirus (HPV)    - Pap imaged thin layer screen only - recommended age 21 - 24 years    3. Screen for STD (sexually transmitted disease)    - NEISSERIA GONORRHOEA PCR  - CHLAMYDIA TRACHOMATIS PCR    4. Vitamin D deficiency disease    - Vitamin D Deficiency    5. Hyperlipidemia LDL goal <160    - Lipid panel reflex to direct LDL Fasting    6. Iron deficiency anemia, unspecified iron deficiency anemia type    - CBC with platelets    7. Major depressive disorder, single episode, moderate (H)    - CBC with platelets  - TSH with free T4 reflex    8. Family history of diabetes mellitus    - Comprehensive metabolic panel    COUNSELING:  Reviewed preventive health counseling, as reflected in patient instructions       Regular exercise       Healthy diet/nutrition       Vision screening       Hearing screening       Contraception       Family planning         reports that she has never smoked. She has never used smokeless tobacco.    Estimated body mass index is 30.38 kg/(m^2) as calculated from the following:    Height as of 11/13/17: 5' 7\" (1.702 m).    Weight as of 11/13/17: 194 lb (88 kg).   Weight management plan: Discussed healthy diet and exercise guidelines and patient will follow up in 12 months in clinic to re-evaluate.    Counseling Resources:  ATP IV Guidelines  Pooled Cohorts Equation Calculator  Breast Cancer Risk Calculator  FRAX Risk Assessment  ICSI " Preventive Guidelines  Dietary Guidelines for Americans, 2010  USDA's MyPlate  ASA Prophylaxis  Lung CA Screening    Ramona Ann Aaseby-Aguilera, PA-C  [facility]  Answers for HPI/ROS submitted by the patient on 12/16/2017   PHQ-2 Score: 0

## 2017-12-17 LAB
C TRACH DNA SPEC QL NAA+PROBE: NEGATIVE
N GONORRHOEA DNA SPEC QL NAA+PROBE: NEGATIVE
SPECIMEN SOURCE: NORMAL
SPECIMEN SOURCE: NORMAL

## 2017-12-18 LAB — DEPRECATED CALCIDIOL+CALCIFEROL SERPL-MC: 18 UG/L (ref 20–75)

## 2017-12-19 LAB
COPATH REPORT: NORMAL
PAP: NORMAL

## 2018-01-03 ENCOUNTER — TELEPHONE (OUTPATIENT)
Dept: FAMILY MEDICINE | Facility: CLINIC | Age: 23
End: 2018-01-03

## 2018-01-03 NOTE — TELEPHONE ENCOUNTER
Patient calling requesting results of her lab results from her physical exam on 12/16/2017.  Please review and advise.      [phone] (home)     Results for orders placed or performed in visit on 12/16/17   Pap imaged thin layer screen only - recommended age 21 - 24 years   Result Value Ref Range    PAP NIL     Copath Report         Patient Name: JS SALINAS  MR#: [id number]  Specimen #: [id number]  Collected: 12/16/2017  Received: 12/18/2017  Reported: 12/19/2017 13:23  Ordering Phy(s): RAMONA ANN AASEBY-AGUILERA    For improved result formatting, select 'View Enhanced Report Format'  under Linked Documents section.    SPECIMEN/STAIN PROCESS:  Pap imaged thin layer prep screening (Surepath, FocalPoint with guided  screening)       Pap-Cyto x 1    SOURCE: Cervical, endocervical  ----------------------------------------------------------------   Pap imaged thin layer prep screening (Surepath, FocalPoint with guided  screening)  SPECIMEN ADEQUACY:  Satisfactory for evaluation.  -Transformation zone component absent.    CYTOLOGIC INTERPRETATION:    Negative for intraepithelial lesion or malignancy    Electronically signed out by:  DAVON Hernández (ASCP)    Processed and screened at [facility],  [facility]    CLINICAL HISTORY:  LMP: 12/11/17    Papanicolaou Test Limita tions:  Cervical cytology is a screening test  with limited sensitivity; regular screening is critical for cancer  prevention; Pap tests are primarily effective for the  diagnosis/prevention of squamous cell carcinoma, not adenocarcinomas or  other cancers.    TESTING LAB LOCATION:  [address] [place]  [address] [phone]    COLLECTION SITE:  Client:  [facility]  Location: [facility] (R)     CBC with platelets   Result Value Ref Range    WBC 6.4 4.0 - 11.0 10e9/L    RBC Count 5.14 3.8 - 5.2 10e12/L    Hemoglobin 12.6 11.7 - 15.7 g/dL    Hematocrit  40.4 35.0 - 47.0 %    MCV 79 78 - 100 fl    MCH 24.5 (L) 26.5 - 33.0 pg    MCHC 31.2 (L) 31.5 - 36.5 g/dL    RDW 15.6 (H) 10.0 - 15.0 %    Platelet Count 309 150 - 450 10e9/L   TSH with free T4 reflex   Result Value Ref Range    TSH 2.48 0.40 - 4.00 mU/L   Comprehensive metabolic panel   Result Value Ref Range    Sodium 138 133 - 144 mmol/L    Potassium 3.9 3.4 - 5.3 mmol/L    Chloride 104 94 - 109 mmol/L    Carbon Dioxide 27 20 - 32 mmol/L    Anion Gap 7 3 - 14 mmol/L    Glucose 80 70 - 99 mg/dL    Urea Nitrogen 10 7 - 30 mg/dL    Creatinine 0.66 0.52 - 1.04 mg/dL    GFR Estimate >90 >60 mL/min/1.7m2    GFR Estimate If Black >90 >60 mL/min/1.7m2    Calcium 9.2 8.5 - 10.1 mg/dL    Bilirubin Total 0.3 0.2 - 1.3 mg/dL    Albumin 3.9 3.4 - 5.0 g/dL    Protein Total 7.9 6.8 - 8.8 g/dL    Alkaline Phosphatase 95 40 - 150 U/L    ALT 27 0 - 50 U/L    AST 23 0 - 45 U/L   Lipid panel reflex to direct LDL Fasting   Result Value Ref Range    Cholesterol 231 (H) <200 mg/dL    Triglycerides 86 <150 mg/dL    HDL Cholesterol 52 >49 mg/dL    LDL Cholesterol Calculated 162 (H) <100 mg/dL    Non HDL Cholesterol 179 (H) <130 mg/dL   Vitamin D Deficiency   Result Value Ref Range    Vitamin D Deficiency screening 18 (L) 20 - 75 ug/L   NEISSERIA GONORRHOEA PCR   Result Value Ref Range    Specimen Descrip Endocervical     N Gonorrhea PCR Negative NEG^Negative   CHLAMYDIA TRACHOMATIS PCR   Result Value Ref Range    Specimen Description Endocervical     Chlamydia Trachomatis PCR Negative NEG^Negative     Hodan Still RN

## 2018-01-04 NOTE — PROGRESS NOTES
"Dear Ana,    It was a pleasure to see you at your recent visit.      Patient Active Problem List:     Attention deficit hyperactivity disorder (ADHD)     Obesity     Iron deficiency anemia     Anxiety     Eczema     Hyperlipidemia LDL goal <160     GERD (gastroesophageal reflux disease)     Family history of diabetes mellitus     Vitamin D deficiency disease     Anxiety states     Major depressive disorder, single episode, moderate (H)     Cellulitis and abscess of trunk        The results of your recent total cholesterol test were elevated.  Your total cholesterol should be less than 200.    The primary goal of therapy is the LDL or \"bad\" cholesterol.  Your LDL level was elevated.  Your LDL goal based on risk factors (i.e. smoking, family history, high blood pressure, low HDL cholesterol) and age is 160.    Your HDL, or \"good\" cholesterol is normal.  Your goal is an HDL level above 40 if you are male and 50 if you are female    Triglycerides are another cholesterol component that is associated with heart disease.  Normal triglycerides are less than 150.  Your   triglyceride level is normal.    I would recommend: increase daily fiber intake, weight loss, increase physical activity with a goal of 150 minutes moderate exercise weekly, decrease saturated fat intake to less than 7% of total calories and repeat fasting lipids and liver tests in 12 months.    Your vitamin D level was low.  I have called in a script for vitamin D3.  Please take 1 tablet WEEKLYx 8 weeks.   After you finish that script please supplement with vitamin D3 year around at 1000 to 2000 units daily     Your hemoglobin is in normal range but I would recommend you supplement with over the counter iron supplement daily also.     The rest of your labs are within acceptable limits :     Results for orders placed or performed in visit on 12/16/17  -Pap imaged thin layer screen only - recommended age 21 - 24 years       Result                            "                 Value                         Ref Range                       PAP                                               NIL                                                           Copath Report                                                                                                  Patient Name: JS SALINAS   MR#: [id number]   Specimen #: [id number]   Collected: 12/16/2017   Received: 12/18/2017   Reported: 12/19/2017 13:23   Ordering Phy(s): RAMONA ANN AASEBY-AGUILERA      For improved result formatting, select 'View Enhanced Report Format'   under Linked Documents section.      SPECIMEN/STAIN PROCESS:   Pap imaged thin layer prep screening (Surepath, FocalPoint with guided   screening)        Pap-Cyto x 1      SOURCE: Cervical, endocervical   ----------------------------------------------------------------    Pap imaged thin layer prep screening (Surepath, FocalPoint with guided   screening)   SPECIMEN ADEQUACY:   Satisfactory for evaluation.   -Transformation zone component absent.      CYTOLOGIC INTERPRETATION:      Negative for intraepithelial lesion or malignancy      Electronically signed out by:   DAVON Hernández (ASCP)      Processed and screened at [facility]      CLINICAL HISTORY:   LMP: 12/11/17      Papanicolaou Test Limita tions:  Cervical cytology is a screening test   with limited sensitivity; regular screening is critical for cancer   prevention; Pap tests are primarily effective for the   diagnosis/prevention of squamous cell carcinoma, not adenocarcinomas or   other cancers.      TESTING LAB LOCATION:   [facility] [address] [place]  [address] [phone]      COLLECTION SITE:   Client:  [facility]   Location: [facility] (R)     -CBC with platelets       Result                                            Value                         Ref Range                       WBC                                                6.4                           4.0 - 11.0 10e9/L               RBC Count                                         5.14                          3.8 - 5.2 10e12/L               Hemoglobin                                        12.6                          11.7 - 15.7 g/dL                Hematocrit                                        40.4                          35.0 - 47.0 %                   MCV                                               79                            78 - 100 fl                     MCH                                               24.5 (L)                      26.5 - 33.0 pg                  MCHC                                              31.2 (L)                      31.5 - 36.5 g/dL                RDW                                               15.6 (H)                      10.0 - 15.0 %                   Platelet Count                                    309                           150 - 450 10e9/L           -TSH with free T4 reflex       Result                                            Value                         Ref Range                       TSH                                               2.48                          0.40 - 4.00 mU/L           -Comprehensive metabolic panel       Result                                            Value                         Ref Range                       Sodium                                            138                           133 - 144 mmol/L                Potassium                                         3.9                           3.4 - 5.3 mmol/L                Chloride                                          104                           94 - 109 mmol/L                 Carbon Dioxide                                    27                            20 - 32 mmol/L                  Anion Gap                                         7                             3 - 14 mmol/L                    Glucose                                           80                            70 - 99 mg/dL                   Urea Nitrogen                                     10                            7 - 30 mg/dL                    Creatinine                                        0.66                          0.52 - 1.04 mg/dL               GFR Estimate                                      >90                           >60 mL/min/1.7m2                GFR Estimate If Black                             >90                           >60 mL/min/1.7m2                Calcium                                           9.2                           8.5 - 10.1 mg/dL                Bilirubin Total                                   0.3                           0.2 - 1.3 mg/dL                 Albumin                                           3.9                           3.4 - 5.0 g/dL                  Protein Total                                     7.9                           6.8 - 8.8 g/dL                  Alkaline Phosphatase                              95                            40 - 150 U/L                    ALT                                               27                            0 - 50 U/L                      AST                                               23                            0 - 45 U/L                 -Lipid panel reflex to direct LDL Fasting       Result                                            Value                         Ref Range                       Cholesterol                                       231 (H)                       <200 mg/dL                      Triglycerides                                     86                            <150 mg/dL                      HDL Cholesterol                                   52                            >49 mg/dL                       LDL Cholesterol Calculated                        162 (H)                       <100 mg/dL                       Non HDL Cholesterol                               179 (H)                       <130 mg/dL                 -Vitamin D Deficiency       Result                                            Value                         Ref Range                       Vitamin D Deficiency screening                    18 (L)                        20 - 75 ug/L               -NEISSERIA GONORRHOEA PCR       Result                                            Value                         Ref Range                       Specimen Descrip                                  Endocervical                                                  N Gonorrhea PCR                                   Negative                      NEG^Negative               -CHLAMYDIA TRACHOMATIS PCR       Result                                            Value                         Ref Range                       Specimen Description                              Endocervical                                                  Chlamydia Trachomatis PCR                         Negative                      NEG^Negative                   Thank you for choosing [facility]. We appreciate the opportunity to serve   you and look forward to supporting your healthcare needs in the future.    If you have any questions or concerns, please contact us at [phone]      Sincerely,        Ramona Aaseby-Aguilera PA-C          TEST DESCRIPTIONS   CBC (Complete Blood Coun  t) includes hemoglobin, hematocrit, white blood cells, etc. This test can be used to detect anemia, infection, and abnormalities in blood cells.   Cholesterol is one of the blood fats (lipids) and is the building block used by the body for cell wall and   hormone production. Increased levels of cholesterol have been proven to directly contribute to heart disease and strokes.   Triglycerides are one of the blood fats (lipids) and are thought to be associated with heart disease. If you have had anything to   eat  "or drink other than water for 12 hours before the test and your level is high, you should have the test repeated after a 12 hour fast. Abnormally high results may also be associated with diabetes, kidney and liver diseases.   LDL is the low density l  ipoprotein component of the cholesterol. It is the harmful substance that deposits cholesterol on the artery walls contributing to heart attacks and strokes. A high LDL level is associated with higher risk of coronary heart disease.   HDL stands for high   density lipoprotein and refers to the so-called \"good\" cholesterol. HDL picks up excess cholesterol in the bloodstream and carries it back to the liver for disposal. Individuals with higher than average HDL seem to have a lower risk of coronary disease.   Vigorous exercise will help increase the blood levels of HDL.   Risk Factor (Total cholesterol/HDL) is a commonly used ratio for cardiac risk assessment. Less than 5.0 for men and 4.4 for women is ideal.   Sodium is an electrolyte useful in diagnosis of   dehydration, diabetes, hypertension, or other diseases involving electrolyte imbalance. It also preserves the balance between calcium and potassium to maintain normal heart action and equilibrium of the body.   Potassium is also an electrolyte that work  s with sodium to regulate the body's water balance and normalize heart rhythm.   Glucose is a measure of blood sugar and is one of the tests for diabetes. If you have not been fasting, your level will often be high. A low glucose level may be a cause of   weakness or dizziness. Blood sugar ranks with cholesterol as a causative factor in arteriosclerosis and heart attacks.   BUN & Creatinine are waste products excreted by the kidneys. A high BUN can be related to a high protein diet, heavy exercise, fever   and infections, dehydration, kidney stones, and kidney disease. Creatinine elevation is less dependent on diet or exercise and better represents kidney " impairment. Low values are not generally significant.   Calcium is a mineral in the blood controlled b  y the parathyroid glands and kidneys. It is important in the formation of bone, in muscle and nerve function, and in blood clotting. Disease of the parathyroid gland, diseased bones or kidneys, or defective absorption of calcium may cause abnormal levels   from the intestine.   ALT, AST, Alk Phos are liver tests. Enzymes found in the liver as well as skeletal and cardiac muscle. Elevations can often be seen in alcoholism, liver or heart disease. Slightly abnormal values are not considered significant.   T  SH stands for Thyroid Stimulating Hormone. A sensitive test used to determine how the thyroid gland is functioning. The thyroid gland produces hormones that control the body's metabolism. When too few hormones are produced (increased TSH), hypothyroidism   occurs which can cause fatigue, sensitivity to cold, and weight gain - an overall slowing down of bodily functions. When too many thyroid hormones are produces (or decreased TSH), it creates a condition call hyperthyroidism (such as Graves' disease) whi  ch causes rapid heartbeat, weight loss, and dizziness, among other symptoms.   PSA stands for Prostate Specific Antigen. Elevated levels of PSA can increase with trauma, infection, inflammation, or disease processes in the prostate such as BPH (Benigh Pr  ostatic Hypertrophy) or cancer.   Glycohemoglobin or HgBA1C is a 3-month average of blood sugar.

## 2018-04-02 DIAGNOSIS — Z34.90 SUPERVISION OF NORMAL PREGNANCY: Primary | ICD-10-CM

## 2018-04-12 ENCOUNTER — PRENATAL OFFICE VISIT (OUTPATIENT)
Dept: NURSING | Facility: CLINIC | Age: 23
End: 2018-04-12
Payer: COMMERCIAL

## 2018-04-12 DIAGNOSIS — Z34.90 SUPERVISION OF NORMAL PREGNANCY: Primary | ICD-10-CM

## 2018-04-12 LAB
ABO + RH BLD: NORMAL
ABO + RH BLD: NORMAL
BETA HCG QUAL IFA URINE: POSITIVE
BLD GP AB SCN SERPL QL: NORMAL
BLOOD BANK CMNT PATIENT-IMP: NORMAL
ERYTHROCYTE [DISTWIDTH] IN BLOOD BY AUTOMATED COUNT: 15.8 % (ref 10–15)
HCT VFR BLD AUTO: 37.4 % (ref 35–47)
HGB BLD-MCNC: 11.7 G/DL (ref 11.7–15.7)
MCH RBC QN AUTO: 23.7 PG (ref 26.5–33)
MCHC RBC AUTO-ENTMCNC: 31.3 G/DL (ref 31.5–36.5)
MCV RBC AUTO: 76 FL (ref 78–100)
PLATELET # BLD AUTO: 301 10E9/L (ref 150–450)
RBC # BLD AUTO: 4.94 10E12/L (ref 3.8–5.2)
SPECIMEN EXP DATE BLD: NORMAL
WBC # BLD AUTO: 8.1 10E9/L (ref 4–11)

## 2018-04-12 PROCEDURE — 86780 TREPONEMA PALLIDUM: CPT | Performed by: FAMILY MEDICINE

## 2018-04-12 PROCEDURE — 87086 URINE CULTURE/COLONY COUNT: CPT | Performed by: FAMILY MEDICINE

## 2018-04-12 PROCEDURE — 87389 HIV-1 AG W/HIV-1&-2 AB AG IA: CPT | Performed by: FAMILY MEDICINE

## 2018-04-12 PROCEDURE — 36415 COLL VENOUS BLD VENIPUNCTURE: CPT | Performed by: FAMILY MEDICINE

## 2018-04-12 PROCEDURE — 85027 COMPLETE CBC AUTOMATED: CPT | Performed by: FAMILY MEDICINE

## 2018-04-12 PROCEDURE — 99207 ZZC NO CHARGE NURSE ONLY: CPT

## 2018-04-12 PROCEDURE — 86850 RBC ANTIBODY SCREEN: CPT | Performed by: FAMILY MEDICINE

## 2018-04-12 PROCEDURE — 86762 RUBELLA ANTIBODY: CPT | Performed by: FAMILY MEDICINE

## 2018-04-12 PROCEDURE — 86901 BLOOD TYPING SEROLOGIC RH(D): CPT | Performed by: FAMILY MEDICINE

## 2018-04-12 PROCEDURE — 86900 BLOOD TYPING SEROLOGIC ABO: CPT | Performed by: FAMILY MEDICINE

## 2018-04-12 PROCEDURE — 87340 HEPATITIS B SURFACE AG IA: CPT | Performed by: FAMILY MEDICINE

## 2018-04-12 PROCEDURE — 84703 CHORIONIC GONADOTROPIN ASSAY: CPT | Performed by: FAMILY MEDICINE

## 2018-04-12 RX ORDER — PRENATAL VIT/IRON FUM/FOLIC AC 27MG-0.8MG
1 TABLET ORAL DAILY
Qty: 100 TABLET | Refills: 3 | COMMUNITY
Start: 2018-04-12 | End: 2018-06-07 | Stop reason: ALTCHOICE

## 2018-04-12 NOTE — MR AVS SNAPSHOT
After Visit Summary   4/12/2018    Ana Mauricio    MRN: [id number]           Patient Information     Date Of Birth          1995        Visit Information        Provider Department      4/12/2018 9:00 AM RI PRENATAL NURSE [facility]        Today's Diagnoses     Supervision of normal pregnancy    -  1       Follow-ups after your visit        Your next 10 appointments already scheduled     May 03, 2018  2:00 PM CDT   US OB < 14 WEEKS WITH TRANSVAGINAL SINGLE with RIUS1   [facility]    [address]  [facility] [id number] [phone]           Please bring a list of your medicines (including vitamins, minerals and over-the-counter drugs). Also, tell your doctor about any allergies you may have. Wear comfortable clothes and leave your valuables at home.  If you re less than 20 weeks drink four 8-ounce glasses of fluid an hour before your exam. If you need to empty your bladder before your exam, try to release only a little urine. Then, drink another glass of fluid.  You may have up to two family members in the exam room. If you bring a small child, an adult must be there to care for him or her.  Please call the Imaging Department at your exam site with any questions.            May 08, 2018  3:45 PM CDT   New Prenatal with Karen Ledezma MD   [facility]    [address] [place] [id number] [phone]              Future tests that were ordered for you today     Open Future Orders        Priority Expected Expires Ordered    US OB <14 Weeks w Transvaginal Single Routine  4/12/2019 4/12/2018            Who to contact     If you have questions or need follow up information about today's clinic visit or your schedule please contact [facility] directly at [phone].  Normal or non-critical lab and imaging results will be  "communicated to you by MyChart, letter or phone within 4 business days after the clinic has received the results. If you do not hear from us within 7 days, please contact the clinic through Emailage or phone. If you have a critical or abnormal lab result, we will notify you by phone as soon as possible.  Submit refill requests through Emailage or call your pharmacy and they will forward the refill request to us. Please allow 3 business days for your refill to be completed.          Additional Information About Your Visit        Emailage Information     Emailage lets you send messages to your doctor, view your test results, renew your prescriptions, schedule appointments and more. To sign up, go to [email or web address] . Click on \"Log in\" on the left side of the screen, which will take you to the Welcome page. Then click on \"Sign up Now\" on the right side of the page.     You will be asked to enter the access code listed below, as well as some personal information. Please follow the directions to create your username and password.     Your access code is: [email or web address]  Expires: 2018  9:58 AM     Your access code will  in 90 days. If you need help or a new code, please call your [place] clinic or [phone].        Care EveryWhere ID     This is your Care EveryWhere ID. This could be used by other organizations to access your [place] medical records  [id number]        Your Vitals Were     Last Period                   2018            Blood Pressure from Last 3 Encounters:   17 110/66   17 111/76   11/10/17 (!) 83/65    Weight from Last 3 Encounters:   17 196 lb (88.9 kg)   17 194 lb (88 kg)   11/10/17 196 lb (88.9 kg)              We Performed the Following     ABO/Rh type and screen     Anti Treponema     Beta HCG qual IFA urine - [facility] and [place]     CBC with platelets     Hepatitis B surface antigen     HIV Antigen Antibody Combo     Rubella Antibody IgG Quantitative "     Urine Culture Aerobic Bacterial        Primary Care Provider Office Phone # Fax #    Susan Rachele Haase, APRN -[phone] [address] [facility] [address]        Equal Access to Services     CLARA JACOBSON : Hadii aad ku hadmeñoo Soelaineali, waaxda luqadaha, qaybta kaalmada adeegyada, waxpat idiin hayaan elle smith sergey kennedy. So [facility] [phone].    ATENCIÓN: Si habla español, tiene a gary disposición servicios gratuitos de asistencia lingüística. Llame al [phone].    We comply with applicable federal civil rights laws and [place] laws. We do not discriminate on the basis of race, color, national origin, age, disability, sex, sexual orientation, or gender identity.            Thank you!     Thank you for choosing [facility]  for your care. Our goal is always to provide you with excellent care. Hearing back from our patients is one way we can continue to improve our services. Please take a few minutes to complete the written survey that you may receive in the mail after your visit with us. Thank you!             Your Updated Medication List - Protect others around you: Learn how to safely use, store and throw away your medicines at www.disposemymeds.org.          This list is accurate as of 4/12/18  9:58 AM.  Always use your most recent med list.                   Brand Name Dispense Instructions for use Diagnosis    prenatal multivitamin plus iron 27-0.8 MG Tabs per tablet     100 tablet    Take 1 tablet by mouth daily

## 2018-04-12 NOTE — NURSING NOTE
NPN nurse visit. 1st dr visit scheduled for 5/8/18 with Karen Ledezma M.D. Pap not due. Last pap 12/2017.  7w0d    SUSAN Flores RN

## 2018-04-13 LAB
BACTERIA SPEC CULT: NORMAL
HBV SURFACE AG SERPL QL IA: NONREACTIVE
HIV 1+2 AB+HIV1 P24 AG SERPL QL IA: NONREACTIVE
RUBV IGG SERPL IA-ACNC: 33 IU/ML
SPECIMEN SOURCE: NORMAL
T PALLIDUM IGG+IGM SER QL: NEGATIVE

## 2018-05-03 ENCOUNTER — RADIANT APPOINTMENT (OUTPATIENT)
Dept: ULTRASOUND IMAGING | Facility: CLINIC | Age: 23
End: 2018-05-03
Attending: OBSTETRICS & GYNECOLOGY
Payer: COMMERCIAL

## 2018-05-03 DIAGNOSIS — Z34.90 SUPERVISION OF NORMAL PREGNANCY: ICD-10-CM

## 2018-05-03 PROCEDURE — 76815 OB US LIMITED FETUS(S): CPT | Performed by: OBSTETRICS & GYNECOLOGY

## 2018-05-03 PROCEDURE — 76817 TRANSVAGINAL US OBSTETRIC: CPT | Performed by: OBSTETRICS & GYNECOLOGY

## 2018-05-08 ENCOUNTER — PRENATAL OFFICE VISIT (OUTPATIENT)
Dept: OBGYN | Facility: CLINIC | Age: 23
End: 2018-05-08
Payer: COMMERCIAL

## 2018-05-08 VITALS
BODY MASS INDEX: 32.73 KG/M2 | DIASTOLIC BLOOD PRESSURE: 62 MMHG | HEART RATE: 80 BPM | WEIGHT: 209 LBS | SYSTOLIC BLOOD PRESSURE: 120 MMHG

## 2018-05-08 DIAGNOSIS — E66.811 CLASS 1 OBESITY DUE TO EXCESS CALORIES WITH SERIOUS COMORBIDITY IN ADULT, UNSPECIFIED BMI: ICD-10-CM

## 2018-05-08 DIAGNOSIS — Z34.01 ENCOUNTER FOR SUPERVISION OF NORMAL FIRST PREGNANCY IN FIRST TRIMESTER: Primary | ICD-10-CM

## 2018-05-08 DIAGNOSIS — E66.09 CLASS 1 OBESITY DUE TO EXCESS CALORIES WITH SERIOUS COMORBIDITY IN ADULT, UNSPECIFIED BMI: ICD-10-CM

## 2018-05-08 LAB — HBA1C MFR BLD: 5.5 % (ref 0–5.6)

## 2018-05-08 PROCEDURE — 36415 COLL VENOUS BLD VENIPUNCTURE: CPT | Performed by: OBSTETRICS & GYNECOLOGY

## 2018-05-08 PROCEDURE — 99207 ZZC FIRST OB VISIT: CPT | Performed by: OBSTETRICS & GYNECOLOGY

## 2018-05-08 PROCEDURE — 87591 N.GONORRHOEAE DNA AMP PROB: CPT | Performed by: OBSTETRICS & GYNECOLOGY

## 2018-05-08 PROCEDURE — 83036 HEMOGLOBIN GLYCOSYLATED A1C: CPT | Performed by: OBSTETRICS & GYNECOLOGY

## 2018-05-08 PROCEDURE — 87491 CHLMYD TRACH DNA AMP PROBE: CPT | Performed by: OBSTETRICS & GYNECOLOGY

## 2018-05-08 NOTE — LETTER
[place] [address]  [facility] [address]  Phone: [phone]    05/08/18    Ana Mauricio  [address] [facility] [address]      To whom it may concern:     nAa Mauricio is a patient under my care. She is currently pregnant with an estimated due date of 11/29/18.     If you have any questions please do not hesitate to contact our clinic at [phone].    Sincerely,      Karen Ledezma MD

## 2018-05-08 NOTE — PROGRESS NOTES
New OB Visit  Ana Mauricio   May 8, 2018   10w5d     Subjective: Ana Mauricio 22 year old  at 10w5d dated by LMP c/w 10w US here today for initial OB visit. Patient reports Nausea with emesis each morning. Denies cramping and vaginal spotting. Eats every 4 hours.     Gyn History:   Menses: LMP: Patient's last menstrual period was 2018. frequency: q month  Sexually transmitted disease history: none.    Occupation: [employer]  Exercise: minimal    Since her last LMP she denies use of alcohol, tobacco and street drugs.    OBhx: never pregnant  Obstetric History       T0      L0     SAB0   TAB0   Ectopic0   Multiple0   Live Births0       # Outcome Date GA Lbr [place]/2nd Weight Sex Delivery Anes PTL Lv   1 Current                   ROS: Ten point review of systems was reviewed and negative except the above.    HISTORY:  Past Medical History:   Diagnosis Date     Attention deficit disorder without mention of hyperactivity     not on meds now, tried several     Cellulitis and abscess of trunk 11/10/2017     Hyperlipidemia LDL goal <160 2011     Past Surgical History:   Procedure Laterality Date     NO HISTORY OF SURGERY       Family History   Problem Relation Age of Onset     Lipids Mother      Lipids Father      C.A.D. No family hx of      Social History     Social History     Marital status: Single     Spouse name: Kolton     Number of children: N/A     Years of education: N/A     Social History Main Topics     Smoking status: Never Smoker     Smokeless tobacco: Never Used     Alcohol use No     Drug use: No     Sexual activity: Yes     Partners: Male     Other Topics Concern     Parent/Sibling W/ Cabg, Mi Or Angioplasty Before 65f 55m? No     Social History Narrative     Current Outpatient Prescriptions   Medication Sig     Prenatal Vit-Fe Fumarate-FA (PRENATAL MULTIVITAMIN PLUS IRON) 27-0.8 MG TABS per tablet Take 1 tablet by mouth daily     No current facility-administered  medications for this visit.      Allergies   Allergen Reactions     Pork Allergy      Strattera [Atomoxetine Hydrochloride]      palpitations       Past medical, surgical, social and family history were reviewed and updated in EPIC.      EXAM:  /62 (BP Location: Right arm, Patient Position: Chair, Cuff Size: Adult Regular)  Pulse 80  Wt 209 lb (94.8 kg)  LMP 2018  BMI 32.73 kg/m2     Gen:  no acute distress, comfortable  HENT: No scleral injection or icterus  CV: Regular rate and rhythm, no murmur  Resp: CTAB, Normal work of breathing, no cough  Breast: No visible masses or suspicious skin changes.  No discrete or dominant masses to palpation.  No nipple discharge. No axillary lymphadenopathy.  GI: Abdomen soft, non-tender. No masses, organomegaly  Skin: No suspicious lesions or rashes  Psychiatric: mentation appears normal and affect bright       Recent Labs   Lab Test  18   0954   ABO  O   RH  Pos   AS  Neg     Rhogam not indicated     Recent Labs   Lab Test  18   0953   HEPBANG  Nonreactive   HIAGAB  Nonreactive   RUQIGG  33       Treponemal antibody neg    CBC RESULTS:   Recent Labs   Lab Test  18   0953   WBC  8.1   RBC  4.94   HGB  11.7   HCT  37.4   MCV  76*   MCH  23.7*   MCHC  31.3*   RDW  15.8*   PLT  301       ASSESSMENT:  22 year old  at 10w5d dated by LMP c/w 10w US here for NOB visit.    PLAN:    1)Prenatal labs reviewed. She has no questions.  2) EDUCATION : RECOMMENDED WEIGHT GAIN: 11-20 given There is no height or weight on file to calculate BMI..   - Instructed on best evidence for: healthy diet and foods to avoid; exercise and activity during pregnancy; and maintenance of a generally healthy lifestyle. Reviewed early pregnancy education, provider coverage, labor and delivery, and prenatal visits.  Discussed the harms, benefits, side effects and alternative therapies for current prescribed and OTC medications.  - recommend PNV  3) Discussed options for  screening for chromosomal anomalies, including first screen, noninvasive prenatal testing, CVS/amniocentesis, quad screen, and ultrasound at 18-20 weeks. She is electing ultrasound at 18-20 weeks.  4) NAusea: reviewed frequent meals, B6 and Unisom.    Follow up in 4 weeks. She is encouraged to call sooner with questions or concerns.    Karen Ledezma MD   Obstetrics and Gynecology

## 2018-05-08 NOTE — MR AVS SNAPSHOT
After Visit Summary   5/8/2018    Ana Mauricio    MRN: [id number]           Patient Information     Date Of Birth          1995        Visit Information        Provider Department      5/8/2018 3:45 PM Karen Ledezma MD [facility]        Today's Diagnoses     Class 1 obesity due to excess calories with serious comorbidity in adult, unspecified BMI    -  1      Care Instructions    Early Pregnancy Information:    Rest  Your body requires more sleep in early pregnancy. Try to get plenty of sleep at night or take a short nap during the day. Being tired can often trigger nausea. If your nausea is worse in the evening, try taking a nap before dinner.    Exercise  Energy levels are normally low in early pregnancy and exercise may be the last thing on your mind, but getting out and walking briskly for 30 minutes each day will increase metabolism, relieve stress and psychologically improve your outlook. Walking after meals can improve heartburn, constipation, and indigestion.   - You can generally continue the same exercise routine in early pregnancy that you were doing prior to pregnancy. If you were not getting daily exercise before, now is a great time to start by walking or biking for 30 minutes daily.  - Any exercise that causes cramping, bleeding, or pain needs to be stopped right away. Please report to your doctor.    [place]  It is safe to continue sexual activity in pregnancy. If you experience bleeding or pain with intercourse, please abstain until you are able to discuss this with your doctor.       Nausea & Vomiting  Women experience this problem in varying degrees during pregnancy and you may have different experiences in subsequent pregnancies. Some women experience  morning sickness,  but it is also common to experience nausea any time throughout the day.  Listen to your body and eat the kinds of foods that make you feel best.  Suggestions for Diet  The most  important rule is to eat small amounts often - even if you are not hungry. Try not to go more than three hours without eating during the day or ten hours at night. An empty stomach triggers nausea.   Eat slowly and avoid foods that are spicy or high in fat. These are difficult to digest. Do not overfill your stomach.   Drink fruit juices, water and milk between meals.   Eat a few crackers, dry toast or vanilla wafers before getting out of bed in the morning. Stay in bed 15-20 minutes after eating.  Give yourself extra time in the morning.   Do not brush your teeth until you have been up for a while.   Do not skip breakfast.   Have a snack at bedtime that includes both carbohydrates and protein, e.g., peanut butter toast or hot chocolate made with milk.  A specific food or drink may trigger nausea in one woman and alleviate it in another. Find out what works best for you and eliminate the foods that cause nausea.   Most women tolerate ice cold drinks and foods best. Sherbet and fruit juices are good examples.   Try avoid coffee and products containing caffeine; it increases stomach acid. About 1 cup of coffee daily is considered safe in pregnancy, but this may be triggering your nausea.  Avoid smoking: it also increases stomach acid.    Vitamins  Vitamins B6 and Vitamin C may help improve nausea. There have been no definite studies to prove this effective, but some women do improve.   To prevent nausea take: 25 mg of Vitamin B6 daily. You can take this up to 3 times daily. You may have to cut a tablet in half to get to 25mg   Take: 500 mg. Vitamin C daily.   Yogurt is a good source of the B Vitamins.   If taking your prenatal vitamin increases or causes nausea, stop for 7-10 days, then try again. You can also try switching to a formulation without iron in early pregnancy (a women's once daily vitamin).   Medication and other remedies  Unisom, taken as directed, may be used with Vitamin B6. Take 25mg of unisom at  night, this can make you drowsy.   Ginger tablets, 250 mg, 2-4 times per day   Acupressure Wrist Bands (Sea Bands)   Peppermint or ginger decaffeinated tea   Peppermint gum or candy  Inform your doctor if:  You cannot keep any solid food down for 24 hours.   You cannot keep liquids down.   You are losing weight.   You are running a temperature greater than 100.4  F.    Common Ailments:  Below is a list of medications that are safe to take in pregnancy. This is not everything that is safe, but merely a list of over the counter options to get you through frequently experienced symptoms.     Upper Respiratory Infections:  Sinus congestion and colds - Plain Sudafed, Tylenol Sinus  Antihistamines -  Claritin, Benadryl, Zyrtec  Avoid nasal sprays, except for Saline only.  Sore Throat:  Lozenges - Cepacol, Chloraseptic  Cough drops - Halls, Vicks, or lemon drops    Headache, Pain, or Fever:  Tylenol or other acetaminophen products: 650-1000 mg every 6-8 hours (up to 3 gm/24 hours) as needed.   A single serving of caffeine   Increase fluid consumption.  Try a short nap. If not relieved, call the office.     Heartburn:  Sodium-free antacids - Gaviscon, Maalox, Mylanta, Tums  Acid Reflux - Prilosec, Zantac 75 mg 2 times daily  Gas: Simethicone products - Gas-X    Constipation:  Fiber supplements - Fibercon, Metamucil (powder, capsules, or wafers)  Stool softeners - Colace (Docusate Sodium),   Laxatives -Milk of Magnesia, Senna, Miralax  Diarrhea:  Imodium, Imodium AD  Avoid dairy products and stop prenatal vitamins until diarrhea subsides. If not resolved in 24-36 hours, call the office.    Insect Bites and Rashes:  Lotions - Calamine, Caladryl, Hydrocortisone 1%, DEET bug spray  Sprays - DEET bug spray  If rash is unusual or persists, call the office.    Hemorrhoids:  Preparation H, Anusol, Tucks pads      Call the clinic ([facility] [phone], [place] [phone]) right away with any of the following concerns. These  "phones are answered at all hours:    1.   Severe abdominal pain or cramping, that does not go away with rest and hydration    2.   Vaginal bleeding.      Continue your prenatal vitamins daily.    Please call with any additional concerns that your have, and continue your prenatal visits every four weeks!           Follow-ups after your visit        Who to contact     If you have questions or need follow up information about today's clinic visit or your schedule please contact [facility] directly at [phone].  Normal or non-critical lab and imaging results will be communicated to you by PrimeSensehart, letter or phone within 4 business days after the clinic has received the results. If you do not hear from us within 7 days, please contact the clinic through PrimeSensehart or phone. If you have a critical or abnormal lab result, we will notify you by phone as soon as possible.  Submit refill requests through AppNexus or call your pharmacy and they will forward the refill request to us. Please allow 3 business days for your refill to be completed.          Additional Information About Your Visit        AppNexus Information     AppNexus lets you send messages to your doctor, view your test results, renew your prescriptions, schedule appointments and more. To sign up, go to [email or web address] . Click on \"Log in\" on the left side of the screen, which will take you to the Welcome page. Then click on \"Sign up Now\" on the right side of the page.     You will be asked to enter the access code listed below, as well as some personal information. Please follow the directions to create your username and password.     Your access code is: [email or web address]  Expires: 2018  9:58 AM     Your access code will  in 90 days. If you need help or a new code, please call your [facility] or [phone].        Care EveryWhere ID     This is your Care EveryWhere ID. This could be used by other organizations to access your " [place] medical records  [id number]        Your Vitals Were     Pulse Last Period BMI (Body Mass Index)             80 02/22/2018 32.73 kg/m2          Blood Pressure from Last 3 Encounters:   05/08/18 120/62   12/16/17 110/66   11/13/17 111/76    Weight from Last 3 Encounters:   05/08/18 209 lb (94.8 kg)   12/16/17 196 lb (88.9 kg)   11/13/17 194 lb (88 kg)              We Performed the Following     Hemoglobin A1c        Primary Care Provider Office Phone # Fax #    Susan Rachele Haase, APRN -[phone]       [address] [facility] [address]        Equal Access to Services     CLARA JACOBSON : Hadii jillian downingo Zan, waaxda luqadaha, qaybta kaalmada adeegyada, yaw rincon . So [facility] [phone].    ATENCIÓN: Si habla español, tiene a gary disposición servicios gratuitos de asistencia lingüística. Llame al [phone].    We comply with applicable federal civil rights laws and [place] laws. We do not discriminate on the basis of race, color, national origin, age, disability, sex, sexual orientation, or gender identity.            Thank you!     Thank you for choosing [facility]  for your care. Our goal is always to provide you with excellent care. Hearing back from our patients is one way we can continue to improve our services. Please take a few minutes to complete the written survey that you may receive in the mail after your visit with us. Thank you!             Your Updated Medication List - Protect others around you: Learn how to safely use, store and throw away your medicines at www.disposemymeds.org.          This list is accurate as of 5/8/18  4:14 PM.  Always use your most recent med list.                   Brand Name Dispense Instructions for use Diagnosis    prenatal multivitamin plus iron 27-0.8 MG Tabs per tablet     100 tablet    Take 1 tablet by mouth daily

## 2018-05-08 NOTE — PATIENT INSTRUCTIONS
Early Pregnancy Information:    Rest  Your body requires more sleep in early pregnancy. Try to get plenty of sleep at night or take a short nap during the day. Being tired can often trigger nausea. If your nausea is worse in the evening, try taking a nap before dinner.    Exercise  Energy levels are normally low in early pregnancy and exercise may be the last thing on your mind, but getting out and walking briskly for 30 minutes each day will increase metabolism, relieve stress and psychologically improve your outlook. Walking after meals can improve heartburn, constipation, and indigestion.   - You can generally continue the same exercise routine in early pregnancy that you were doing prior to pregnancy. If you were not getting daily exercise before, now is a great time to start by walking or biking for 30 minutes daily.  - Any exercise that causes cramping, bleeding, or pain needs to be stopped right away. Please report to your doctor.    Hale  It is safe to continue sexual activity in pregnancy. If you experience bleeding or pain with intercourse, please abstain until you are able to discuss this with your doctor.       Nausea & Vomiting  Women experience this problem in varying degrees during pregnancy and you may have different experiences in subsequent pregnancies. Some women experience  morning sickness,  but it is also common to experience nausea any time throughout the day.  Listen to your body and eat the kinds of foods that make you feel best.  Suggestions for Diet  The most important rule is to eat small amounts often - even if you are not hungry. Try not to go more than three hours without eating during the day or ten hours at night. An empty stomach triggers nausea.   Eat slowly and avoid foods that are spicy or high in fat. These are difficult to digest. Do not overfill your stomach.   Drink fruit juices, water and milk between meals.   Eat a few crackers, dry toast or vanilla wafers before  getting out of bed in the morning. Stay in bed 15-20 minutes after eating.  Give yourself extra time in the morning.   Do not brush your teeth until you have been up for a while.   Do not skip breakfast.   Have a snack at bedtime that includes both carbohydrates and protein, e.g., peanut butter toast or hot chocolate made with milk.  A specific food or drink may trigger nausea in one woman and alleviate it in another. Find out what works best for you and eliminate the foods that cause nausea.   Most women tolerate ice cold drinks and foods best. Sherbet and fruit juices are good examples.   Try avoid coffee and products containing caffeine; it increases stomach acid. About 1 cup of coffee daily is considered safe in pregnancy, but this may be triggering your nausea.  Avoid smoking: it also increases stomach acid.    Vitamins  Vitamins B6 and Vitamin C may help improve nausea. There have been no definite studies to prove this effective, but some women do improve.   To prevent nausea take: 25 mg of Vitamin B6 daily. You can take this up to 3 times daily. You may have to cut a tablet in half to get to 25mg   Take: 500 mg. Vitamin C daily.   Yogurt is a good source of the B Vitamins.   If taking your prenatal vitamin increases or causes nausea, stop for 7-10 days, then try again. You can also try switching to a formulation without iron in early pregnancy (a women's once daily vitamin).   Medication and other remedies  Unisom, taken as directed, may be used with Vitamin B6. Take 25mg of unisom at night, this can make you drowsy.   Kailey tablets, 250 mg, 2-4 times per day   Acupressure Wrist Bands (Sea Bands)   Peppermint or kailey decaffeinated tea   Peppermint gum or candy  Inform your doctor if:  You cannot keep any solid food down for 24 hours.   You cannot keep liquids down.   You are losing weight.   You are running a temperature greater than 100.4  F.    Common Ailments:  Below is a list of medications that are  safe to take in pregnancy. This is not everything that is safe, but merely a list of over the counter options to get you through frequently experienced symptoms.     Upper Respiratory Infections:  Sinus congestion and colds - Plain Sudafed, Tylenol Sinus  Antihistamines -  Claritin, Benadryl, Zyrtec  Avoid nasal sprays, except for Saline only.  Sore Throat:  Lozenges - Cepacol, Chloraseptic  Cough drops - Halls, Vicks, or lemon drops    Headache, Pain, or Fever:  Tylenol or other acetaminophen products: 650-1000 mg every 6-8 hours (up to 3 gm/24 hours) as needed.   A single serving of caffeine   Increase fluid consumption.  Try a short nap. If not relieved, call the office.     Heartburn:  Sodium-free antacids - Gaviscon, Maalox, Mylanta, Tums  Acid Reflux - Prilosec, Zantac 75 mg 2 times daily  Gas: Simethicone products - Gas-X    Constipation:  Fiber supplements - Fibercon, Metamucil (powder, capsules, or wafers)  Stool softeners - Colace (Docusate Sodium),   Laxatives -Milk of Magnesia, Senna, Miralax  Diarrhea:  Imodium, Imodium AD  Avoid dairy products and stop prenatal vitamins until diarrhea subsides. If not resolved in 24-36 hours, call the office.    Insect Bites and Rashes:  Lotions - Calamine, Caladryl, Hydrocortisone 1%, DEET bug spray  Sprays - DEET bug spray  If rash is unusual or persists, call the office.    Hemorrhoids:  Preparation H, Anusol, Tucks pads      Call the clinic ([facility] [phone], [place] [phone]) right away with any of the following concerns. These phones are answered at all hours:    1.   Severe abdominal pain or cramping, that does not go away with rest and hydration    2.   Vaginal bleeding.      Continue your prenatal vitamins daily.    Please call with any additional concerns that your have, and continue your prenatal visits every four weeks!

## 2018-05-08 NOTE — NURSING NOTE
"  Chief Complaint   Patient presents with     Prenatal Care     First OB appt, 10 weeks 5 days- declined progenity       Initial /62 (BP Location: Right arm, Patient Position: Chair, Cuff Size: Adult Regular)  Pulse 80  Wt 209 lb (94.8 kg)  LMP 02/22/2018  BMI 32.73 kg/m2 Estimated body mass index is 32.73 kg/(m^2) as calculated from the following:    Height as of 12/16/17: 5' 7\" (1.702 m).    Weight as of this encounter: 209 lb (94.8 kg).  Medication Reconciliation: complete    Sabrina Lechuga CMA    "

## 2018-05-13 ENCOUNTER — HEALTH MAINTENANCE LETTER (OUTPATIENT)
Age: 23
End: 2018-05-13

## 2018-06-03 ENCOUNTER — HEALTH MAINTENANCE LETTER (OUTPATIENT)
Age: 23
End: 2018-06-03

## 2018-06-07 ENCOUNTER — PRENATAL OFFICE VISIT (OUTPATIENT)
Dept: OBGYN | Facility: CLINIC | Age: 23
End: 2018-06-07
Payer: COMMERCIAL

## 2018-06-07 VITALS
BODY MASS INDEX: 32.56 KG/M2 | DIASTOLIC BLOOD PRESSURE: 68 MMHG | SYSTOLIC BLOOD PRESSURE: 112 MMHG | HEART RATE: 82 BPM | WEIGHT: 207.9 LBS

## 2018-06-07 DIAGNOSIS — Z34.00 SUPERVISION OF NORMAL FIRST PREGNANCY, ANTEPARTUM: Primary | ICD-10-CM

## 2018-06-07 PROCEDURE — 99207 ZZC PRENATAL VISIT: CPT | Performed by: OBSTETRICS & GYNECOLOGY

## 2018-06-07 NOTE — MR AVS SNAPSHOT
After Visit Summary   6/7/2018    Ana Mauricio    MRN: [id number]           Patient Information     Date Of Birth          1995        Visit Information        Provider Department      6/7/2018 4:00 PM Karen Ledezma MD [facility]        Today's Diagnoses     Supervision of normal first pregnancy, antepartum    -  1       Follow-ups after your visit        Your next 10 appointments already scheduled     Jul 06, 2018  2:15 PM CDT   ESTABLISHED PRENATAL with [facility] Curiel [facility]   [facility]    [address] [place] [id number] [phone]            Jul 12, 2018  2:00 PM CDT   US OB > 14 WEEKS COMPLETE SINGLE with RIUS1   [facility]    [address]  [address]  [facility] [id number] [phone]           Please bring a list of your medicines (including vitamins, minerals and over-the-counter drugs). Also, tell your doctor about any allergies you may have. Wear comfortable clothes and leave your valuables at home.  If you re less than 20 weeks drink four 8-ounce glasses of fluid an hour before your exam. If you need to empty your bladder before your exam, try to release only a little urine. Then, drink another glass of fluid.  You may have up to two family members in the exam room. If you bring a small child, an adult must be there to care for him or her.  Please call the Imaging Department at your exam site with any questions.              Future tests that were ordered for you today     Open Future Orders        Priority Expected Expires Ordered    US OB > 14 Weeks Complete Single Routine  6/7/2019 6/7/2018            Who to contact     If you have questions or need follow up information about today's clinic visit or your schedule please contact [facility] directly at [phone].  Normal or non-critical lab and imaging results  will be communicated to you by MyChart, letter or phone within 4 business days after the clinic has received the results. If you do not hear from us within 7 days, please contact the clinic through MyChart or phone. If you have a critical or abnormal lab result, we will notify you by phone as soon as possible.  Submit refill requests through [employer] or call your pharmacy and they will forward the refill request to us. Please allow 3 business days for your refill to be completed.          Additional Information About Your Visit        Care EveryWhere ID     This is your Care EveryWhere ID. This could be used by other organizations to access your [place] medical records  [id number]        Your Vitals Were     Pulse Last Period BMI (Body Mass Index)             82 02/22/2018 32.56 kg/m2          Blood Pressure from Last 3 Encounters:   06/07/18 112/68   05/08/18 120/62   12/16/17 110/66    Weight from Last 3 Encounters:   06/07/18 207 lb 14.4 oz (94.3 kg)   05/08/18 209 lb (94.8 kg)   12/16/17 196 lb (88.9 kg)                 Today's Medication Changes          These changes are accurate as of 6/7/18  4:36 PM.  If you have any questions, ask your nurse or doctor.               Stop taking these medicines if you haven't already. Please contact your care team if you have questions.     prenatal multivitamin plus iron 27-0.8 MG Tabs per tablet   Stopped by:  Karen Ledezma MD                    Primary Care Provider Office Phone # Fax #    Maggie Rachele Haase, APRN [phone] [address] [facility] [address]        Equal Access to Services     [facility] AH: Hadii aad ku hadasho [place], waaxda luqadaha, qaybta kaalmada yaw yang. So [facility] [phone].    ATENCIÓN: Si habla español, tiene a gary disposición servicios gratuitos de asistencia lingüística. Janel al [phone].    We comply with applicable federal civil rights laws and [place]  laws. We do not discriminate on the basis of race, color, national origin, age, disability, sex, sexual orientation, or gender identity.            Thank you!     Thank you for choosing [facility]  for your care. Our goal is always to provide you with excellent care. Hearing back from our patients is one way we can continue to improve our services. Please take a few minutes to complete the written survey that you may receive in the mail after your visit with us. Thank you!             Your Updated Medication List - Protect others around you: Learn how to safely use, store and throw away your medicines at www.disposemymeds.org.          This list is accurate as of 6/7/18  4:36 PM.  Always use your most recent med list.                   Brand Name Dispense Instructions for use Diagnosis    CHEWABLE MULTIPLE VITAMINS OR       Supervision of normal first pregnancy, antepartum

## 2018-06-07 NOTE — NURSING NOTE
"  Chief Complaint   Patient presents with     Prenatal Care     15 weeks 0 days       Initial /68 (BP Location: Right arm, Patient Position: Chair, Cuff Size: Adult Large)  Pulse 82  Wt 207 lb 14.4 oz (94.3 kg)  LMP 02/22/2018  BMI 32.56 kg/m2 Estimated body mass index is 32.56 kg/(m^2) as calculated from the following:    Height as of 12/16/17: 5' 7\" (1.702 m).    Weight as of this encounter: 207 lb 14.4 oz (94.3 kg).  Medication Reconciliation: complete      Sabrina Lechuga CMA      "

## 2018-06-07 NOTE — PROGRESS NOTES
Routine OB Visit:    S: Doing well. No cramping or contractions. 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 No fetal movement. No LoF, VB. Ongoing occasional vomiting, has not tried any relief measures. Insomnia.    O: /68 (BP Location: Right arm, Patient Position: Chair, Cuff Size: Adult Large)  Pulse 82  Wt 207 lb 14.4 oz (94.3 kg)  LMP 2018  BMI 32.56 kg/m2   Gen: resting comfortably, in NAD  See Prenatal flowsheet    A: Ana Mauricio is a 22 year old female  at 15w0d, here for routine OB care. Pregnancy c/b obesity.    P:   1) PNC: O+/RI. Anatomy scan in 3-5w  2) Nausea/vomiting: reviewed B6 and unisom, eating every 2 hours, reglan if no improvement with those options. 2 lb weight loss, patient attributes to improved diet with more fruits and vegetables and decreased fatty food due to heartburn with those choices  3) Insomnia: discussed sleep hygiene, white noise, unisom  4) return to clinic 4w      Karen Ledezma MD  Obstetrics and Gynecology  2018

## 2018-07-06 ENCOUNTER — HOSPITAL ENCOUNTER (OUTPATIENT)
Dept: ULTRASOUND IMAGING | Facility: CLINIC | Age: 23
Discharge: HOME | End: 2018-07-06
Attending: OBSTETRICS & GYNECOLOGY | Admitting: OBSTETRICS & GYNECOLOGY
Payer: COMMERCIAL

## 2018-07-06 ENCOUNTER — TELEPHONE (OUTPATIENT)
Dept: OBGYN | Facility: CLINIC | Age: 23
End: 2018-07-06

## 2018-07-06 ENCOUNTER — PRENATAL OFFICE VISIT (OUTPATIENT)
Dept: OBGYN | Facility: CLINIC | Age: 23
End: 2018-07-06
Payer: COMMERCIAL

## 2018-07-06 VITALS — DIASTOLIC BLOOD PRESSURE: 68 MMHG | BODY MASS INDEX: 33.17 KG/M2 | SYSTOLIC BLOOD PRESSURE: 120 MMHG | WEIGHT: 211.8 LBS

## 2018-07-06 DIAGNOSIS — M79.605 PAIN OF LEFT LOWER EXTREMITY: Primary | ICD-10-CM

## 2018-07-06 DIAGNOSIS — Z34.00 SUPERVISION OF NORMAL FIRST PREGNANCY, ANTEPARTUM: ICD-10-CM

## 2018-07-06 DIAGNOSIS — M79.605 PAIN OF LEFT LOWER EXTREMITY: ICD-10-CM

## 2018-07-06 PROCEDURE — 99207 ZZC PRENATAL VISIT: CPT | Performed by: OBSTETRICS & GYNECOLOGY

## 2018-07-06 PROCEDURE — 93971 EXTREMITY STUDY: CPT | Mod: LT

## 2018-07-06 NOTE — NURSING NOTE
"Chief Complaint   Patient presents with     Prenatal Care   19w1d  Pt c/o leg cramps.  Lotus Gray MA      Initial /68 (BP Location: Right arm, Patient Position: Chair, Cuff Size: Adult Large)  Wt 211 lb 12.8 oz (96.1 kg)  LMP 2018  BMI 33.17 kg/m2 Estimated body mass index is 33.17 kg/(m^2) as calculated from the following:    Height as of 17: 5' 7\" (1.702 m).    Weight as of this encounter: 211 lb 12.8 oz (96.1 kg).  BP completed using cuff size: large        The following HM Due:       The following patient reported/Care Every where data was sent to:  P ABSTRACT QUALITY INITIATIVES [36402]                                            "

## 2018-07-06 NOTE — PROGRESS NOTES
IUP at 19 1/7 weeks     -notes pain in L calf x 3 days     -L calf normal size/no mass  Recommend doppler of L calf

## 2018-07-06 NOTE — MR AVS SNAPSHOT
After Visit Summary   7/6/2018    Ana Mauricio    MRN: [id number]           Patient Information     Date Of Birth          1995        Visit Information        Provider Department      7/6/2018 2:15 PM Jer Curiel MD [facility]        Today's Diagnoses     Supervision of normal first pregnancy, antepartum           Follow-ups after your visit        Follow-up notes from your care team     Return in about 4 weeks (around 8/3/2018).      Your next 10 appointments already scheduled     Jul 12, 2018  2:00 PM CDT   US OB > 14 WEEKS COMPLETE SINGLE with RIUS1   [facility]    [address]  [address]  [facility] [address] [phone]           Please bring a list of your medicines (including vitamins, minerals and over-the-counter drugs). Also, tell your doctor about any allergies you may have. Wear comfortable clothes and leave your valuables at home.  If you re less than 20 weeks drink four 8-ounce glasses of fluid an hour before your exam. If you need to empty your bladder before your exam, try to release only a little urine. Then, drink another glass of fluid.  You may have up to two family members in the exam room. If you bring a small child, an adult must be there to care for him or her.  Please call the Imaging Department at your exam site with any questions.              Who to contact     If you have questions or need follow up information about today's clinic visit or your schedule please contact [facility] directly at [phone].  Normal or non-critical lab and imaging results will be communicated to you by MyChart, letter or phone within 4 business days after the clinic has received the results. If you do not hear from us within 7 days, please contact the clinic through MyChart or phone. If you have a critical or abnormal lab result, we will notify you by phone as soon as  possible.  Submit refill requests through [employer] or call your pharmacy and they will forward the refill request to us. Please allow 3 business days for your refill to be completed.          Additional Information About Your Visit        Care EveryWhere ID     This is your Care EveryWhere ID. This could be used by other organizations to access your [place] medical records  [id number]        Your Vitals Were     Last Period BMI (Body Mass Index)                02/22/2018 33.17 kg/m2           Blood Pressure from Last 3 Encounters:   07/06/18 120/68   06/07/18 112/68   05/08/18 120/62    Weight from Last 3 Encounters:   07/06/18 211 lb 12.8 oz (96.1 kg)   06/07/18 207 lb 14.4 oz (94.3 kg)   05/08/18 209 lb (94.8 kg)              Today, you had the following     No orders found for display       Primary Care Provider Office Phone # Fax #    Maggie Rachele Haase, APRN [phone]       [address] [facility] [address]        Equal Access to Services     [facility]: Hadii aad ku hadasho Soomaali, waaxda luqadaha, qaybta kaalmada adeegyada, waxay halima haychristine rincno . So [facility] [phone].    ATENCIÓN: Si habla español, tiene a gary disposición servicios gratuitos de asistencia lingüística. Llame al [phone].    We comply with applicable federal civil rights laws and [place] laws. We do not discriminate on the basis of race, color, national origin, age, disability, sex, sexual orientation, or gender identity.            Thank you!     Thank you for choosing [facility]  for your care. Our goal is always to provide you with excellent care. Hearing back from our patients is one way we can continue to improve our services. Please take a few minutes to complete the written survey that you may receive in the mail after your visit with us. Thank you!             Your Updated Medication List - Protect others around you: Learn how to safely use, store and throw away  your medicines at www.disposemymeds.org.          This list is accurate as of 7/6/18  2:52 PM.  Always use your most recent med list.                   Brand Name Dispense Instructions for use Diagnosis    CHEWABLE MULTIPLE VITAMINS OR       Supervision of normal first pregnancy, antepartum

## 2018-07-06 NOTE — TELEPHONE ENCOUNTER
Pt was seen for a appt today. Was C/O leg pain and Dr Curiel would like doppler done.  Please confirm which leg and order doppler.  Lotus Gray MA

## 2018-07-12 ENCOUNTER — RADIANT APPOINTMENT (OUTPATIENT)
Dept: ULTRASOUND IMAGING | Facility: CLINIC | Age: 23
End: 2018-07-12
Attending: OBSTETRICS & GYNECOLOGY
Payer: COMMERCIAL

## 2018-07-12 DIAGNOSIS — Z34.00 SUPERVISION OF NORMAL FIRST PREGNANCY, ANTEPARTUM: ICD-10-CM

## 2018-07-12 PROCEDURE — 76805 OB US >/= 14 WKS SNGL FETUS: CPT | Performed by: OBSTETRICS & GYNECOLOGY

## 2018-07-30 ENCOUNTER — PRENATAL OFFICE VISIT (OUTPATIENT)
Dept: OBGYN | Facility: CLINIC | Age: 23
End: 2018-07-30
Payer: COMMERCIAL

## 2018-07-30 VITALS — SYSTOLIC BLOOD PRESSURE: 122 MMHG | DIASTOLIC BLOOD PRESSURE: 70 MMHG | BODY MASS INDEX: 32.58 KG/M2 | WEIGHT: 208 LBS

## 2018-07-30 DIAGNOSIS — Z34.00 SUPERVISION OF NORMAL FIRST PREGNANCY, ANTEPARTUM: ICD-10-CM

## 2018-07-30 PROCEDURE — 99207 ZZC PRENATAL VISIT: CPT | Performed by: OBSTETRICS & GYNECOLOGY

## 2018-07-30 NOTE — NURSING NOTE
"Chief Complaint   Patient presents with     Prenatal Care       Initial /70  Wt 208 lb (94.3 kg)  LMP 2018  BMI 32.58 kg/m2 Estimated body mass index is 32.58 kg/(m^2) as calculated from the following:    Height as of 17: 5' 7\" (1.702 m).    Weight as of this encounter: 208 lb (94.3 kg).  BP completed using cuff size: regular        22w4d      Florence Obando CMA              "

## 2018-07-30 NOTE — PATIENT INSTRUCTIONS
You can reach your [facility] Care Team any time of the day by calling [phone]. This number will put you in touch with the 24 hour nurse line if the clinic is closed.    To contact your OB/GYN Station Coordinator/Surgery Scheduler please call [phone]. This is a direct number for your care team between 8 a.m. and 4 p.m. Monday through Friday.    [facility] is open for your convenience:  Monday through Friday 8 a.m. to 6 p.m.  Closed weekends and all major holidays.

## 2018-07-30 NOTE — MR AVS SNAPSHOT
After Visit Summary   7/30/2018    Ana Mauricio    MRN: [id number]           Patient Information     Date Of Birth          1995        Visit Information        Provider Department      7/30/2018 11:45 AM Margarito Evans MD [facility]        Today's Diagnoses     Supervision of normal first pregnancy, antepartum          Care Instructions    You can reach your [place] Care Team any time of the day by calling [phone]. This number will put you in touch with the 24 hour nurse line if the clinic is closed.    To contact your OB/GYN Station Coordinator/Surgery Scheduler please call [phone]. This is a direct number for your care team between 8 a.m. and 4 p.m. Monday through Friday.    [facility] is open for your convenience:  Monday through Friday 8 a.m. to 6 p.m.  Closed weekends and all major holidays.            Follow-ups after your visit        Your next 10 appointments already scheduled     Aug 29, 2018  4:00 PM CDT   ESTABLISHED PRENATAL with Ashley Hernandez DO   [facility]    [address]  [facility] [id number] [phone]              Who to contact     If you have questions or need follow up information about today's clinic visit or your schedule please contact [facility] directly at [phone].  Normal or non-critical lab and imaging results will be communicated to you by MyChart, letter or phone within 4 business days after the clinic has received the results. If you do not hear from us within 7 days, please contact the clinic through MyChart or phone. If you have a critical or abnormal lab result, we will notify you by phone as soon as possible.  Submit refill requests through [employer] or call your pharmacy and they will forward the refill request to us. Please allow 3 business days for your refill to be completed.          Additional Information About Your Visit         bSafeharAlephD Information     [employer] gives you secure access to your electronic health record. If you see a primary care provider, you can also send messages to your care team and make appointments. If you have questions, please call your primary care clinic.  If you do not have a primary care provider, please call [phone] and they will assist you.        Care EveryWhere ID     This is your Care EveryWhere ID. This could be used by other organizations to access your [place] medical records  [id number]        Your Vitals Were     Last Period BMI (Body Mass Index)                02/22/2018 32.58 kg/m2           Blood Pressure from Last 3 Encounters:   07/30/18 122/70   07/06/18 120/68   06/07/18 112/68    Weight from Last 3 Encounters:   07/30/18 208 lb (94.3 kg)   07/06/18 211 lb 12.8 oz (96.1 kg)   06/07/18 207 lb 14.4 oz (94.3 kg)              Today, you had the following     No orders found for display       Primary Care Provider Office Phone # Fax #    Susan Rachele Haase, APRN [phone]       [address] [address] [address]        Equal Access to Services     [facility]: Hadii aad ku hadasho Soomaali, waaxda luqadaha, qaybta kaalmada adeegyada, yaw kennedy. So [facility] [phone].    ATENCIÓN: Si habla español, tiene a gary disposición servicios gratuitos de asistencia lingüística. Llame al [phone].    We comply with applicable federal civil rights laws and [place] laws. We do not discriminate on the basis of race, color, national origin, age, disability, sex, sexual orientation, or gender identity.            Thank you!     Thank you for choosing [facility]  for your care. Our goal is always to provide you with excellent care. Hearing back from our patients is one way we can continue to improve our services. Please take a few minutes to complete the written survey that you may receive in the mail after your visit with us. Thank  you!             Your Updated Medication List - Protect others around you: Learn how to safely use, store and throw away your medicines at www.disposemymeds.org.          This list is accurate as of 7/30/18 11:59 PM.  Always use your most recent med list.                   Brand Name Dispense Instructions for use Diagnosis    CHEWABLE MULTIPLE VITAMINS OR       Supervision of normal first pregnancy, antepartum

## 2018-08-01 NOTE — PROGRESS NOTES
Ana Mauricio is a 22 year old female, 22w6d, Estimated Date of Delivery: Nov 29, 2018 who presents for prenatal care.    Good fetal movement no concerns.  Depression anxiety symptoms are in good control  A/p:   Doing well without concerns symptoms of concern discussed patient to call

## 2018-08-16 ENCOUNTER — PRENATAL OFFICE VISIT (OUTPATIENT)
Dept: OBGYN | Facility: CLINIC | Age: 23
End: 2018-08-16
Payer: COMMERCIAL

## 2018-08-16 VITALS
SYSTOLIC BLOOD PRESSURE: 122 MMHG | BODY MASS INDEX: 34.06 KG/M2 | HEIGHT: 67 IN | WEIGHT: 217 LBS | DIASTOLIC BLOOD PRESSURE: 68 MMHG

## 2018-08-16 DIAGNOSIS — Z34.00 SUPERVISION OF NORMAL FIRST PREGNANCY, ANTEPARTUM: ICD-10-CM

## 2018-08-16 PROCEDURE — 99207 ZZC PRENATAL VISIT: CPT | Performed by: FAMILY MEDICINE

## 2018-08-16 NOTE — MR AVS SNAPSHOT
After Visit Summary   8/16/2018    Ana Mauricio    MRN: [id number]           Patient Information     Date Of Birth          1995        Visit Information        Provider Department      8/16/2018 3:45 PM Ashley Hernandez,  [facility]        Today's Diagnoses     Supervision of normal first pregnancy, antepartum          Care Instructions    Increasing calcium,   Try gatorade vs pedialyte   Bananas   Pickle juice?     Return in 4 weeks   Return to clinic:  every 4 weeks till 30 weeks, then every 2 weeks till 36 weeks, then weekly till delivery      Phone numbers [place]:  Day/ night [phone] ask for ob triage  Emergency:  Call labor and delivery:  [phone]    What should I call about??    Contraction every 5 minutes for 1 hour 1 minute long (031), bleeding, loss of fluid, headache that doesn't resolve with tylenol, and decreased fetal movement     Start kick counts @ 26-28 weeks   Keep track of movement and discover your normal baby movement pattern   guideline is listed below  Please call if you do not feel the baby move!  We will have you come in for fetal heart rate monitoring:   Perception of at least 10 FMs during 12 hours of normal maternal activity   Perception of least 10 FMs over two hours when the mother is at rest and focused on [facility] Address   [address], [place] [address] [phone]    Dr. Ashley Hernandez, DO    OB/GYN   [facility]                                  Dr. Ashley Hernandez, DO    Obstetrics and Gynecology  [facility]                 Follow-ups after your visit        Your next 10 appointments already scheduled     Sep 11, 2018  2:45 PM CDT   ESTABLISHED PRENATAL with Ashley Hernandez,    [facility]    [address]  [address]  [facility] [id number] [phone]     "        Sep 25, 2018  2:45 PM CDT   [facility] Established Prenatal with Karen Ledezma MD   [facility]    [address]  [address]  [facility] [address] [phone]            Oct 10, 2018  3:00 PM CDT   MyChart OB-GYN Established Prenatal with Ashley Hernandez DO   [facility]    [address]  [address]  [facility] [address] [phone]              Who to contact     If you have questions or need follow up information about today's clinic visit or your schedule please contact [facility] directly at [phone].  Normal or non-critical lab and imaging results will be communicated to you by MyChart, letter or phone within 4 business days after the clinic has received the results. If you do not hear from us within 7 days, please contact the clinic through [employer] or phone. If you have a critical or abnormal lab result, we will notify you by phone as soon as possible.  Submit refill requests through [employer] or call your pharmacy and they will forward the refill request to us. Please allow 3 business days for your refill to be completed.          Additional Information About Your Visit        [employer]t Information     [employer] gives you secure access to your electronic health record. If you see a primary care provider, you can also send messages to your care team and make appointments. If you have questions, please call your primary care clinic.  If you do not have a primary care provider, please call [phone] and they will assist you.        Care EveryWhere ID     This is your Care EveryWhere ID. This could be used by other organizations to access your [facility] medical records  [id number]        Your Vitals Were     Height Last Period BMI (Body Mass Index)             5' 7\" (1.702 m) 02/22/2018 33.99 kg/m2          Blood Pressure from Last 3 Encounters:   08/16/18 " 122/68   07/30/18 122/70   07/06/18 120/68    Weight from Last 3 Encounters:   08/16/18 217 lb (98.4 kg)   07/30/18 208 lb (94.3 kg)   07/06/18 211 lb 12.8 oz (96.1 kg)              Today, you had the following     No orders found for display       Primary Care Provider Office Phone # Fax #    Susan Rachele Haase, APRN -[phone] [address] [facility] [address]        Equal Access to Services     [facility] : Hadii aad ku hadasho Soomaali, waaxda luqadaha, qaybta kaalmada adeegyada, yaw rincon . So [facility] [phone].    ATENCIÓN: Si habla español, tiene a gary disposición servicios gratuitos de asistencia lingüística. [facility] [phone].    We comply with applicable federal civil rights laws and [place] laws. We do not discriminate on the basis of race, color, national origin, age, disability, sex, sexual orientation, or gender identity.            Thank you!     Thank you for choosing [facility]  for your care. Our goal is always to provide you with excellent care. Hearing back from our patients is one way we can continue to improve our services. Please take a few minutes to complete the written survey that you may receive in the mail after your visit with us. Thank you!             Your Updated Medication List - Protect others around you: Learn how to safely use, store and throw away your medicines at www.disposemymeds.org.          This list is accurate as of 8/16/18  3:54 PM.  Always use your most recent med list.                   Brand Name Dispense Instructions for use Diagnosis    CHEWABLE MULTIPLE VITAMINS OR       Supervision of normal first pregnancy, antepartum

## 2018-08-16 NOTE — PROGRESS NOTES
"CC: Here for routine prenatal visit   22 year old y/o  @ 25w0d with Estimated Date of Delivery: 2018   HPI: calf cramps at night   /68  Ht 5' 7\" (1.702 m)  Wt 217 lb (98.4 kg)  LMP 2018  BMI 33.99 kg/m2  See OB flowsheet  + fetal movement, no contractions, no bleeding, no loss of fluid   Discussed monitoring fetal movement       1) concerns: calf cramping  2) Routine: O+, RI, XX  3) Return: in 4 week, glucose next time    [place]    "

## 2018-08-16 NOTE — PATIENT INSTRUCTIONS
Increasing calcium,   Try gatorade vs pedialyte   Bananas   Pickle juice?     Return in 4 weeks   Return to clinic:  every 4 weeks till 30 weeks, then every 2 weeks till 36 weeks, then weekly till delivery      Phone numbers [place]:  Day/ night [phone] ask for ob triage  Emergency:  Call labor and delivery:  [phone]    What should I call about??    Contraction every 5 minutes for 1 hour 1 minute long (511), bleeding, loss of fluid, headache that doesn't resolve with tylenol, and decreased fetal movement     Start kick counts @ 26-28 weeks   Keep track of movement and discover your normal baby movement pattern   guideline is listed below  Please call if you do not feel the baby move!  We will have you come in for fetal heart rate monitoring:   Perception of at least 10 FMs during 12 hours of normal maternal activity   Perception of least 10 FMs over two hours when the mother is at rest and focused on [facility] Address   [address] [facility], [place] [address]  [phone]    Dr. Ashley Hernandez, DO    OB/GYN   [facility]                                  Dr. Ashley Hernandez, DO    Obstetrics and Gynecology  [facility]

## 2018-08-16 NOTE — NURSING NOTE
"25w0d    Chief Complaint   Patient presents with     Prenatal Care       Initial /68  Ht 5' 7\" (1.702 m)  Wt 217 lb (98.4 kg)  LMP 2018  BMI 33.99 kg/m2 Estimated body mass index is 33.99 kg/(m^2) as calculated from the following:    Height as of this encounter: 5' 7\" (1.702 m).    Weight as of this encounter: 217 lb (98.4 kg).  BP completed using cuff size: regular        The following HM Due: NONE         "

## 2018-09-01 ENCOUNTER — NURSE TRIAGE (OUTPATIENT)
Dept: NURSING | Facility: CLINIC | Age: 23
End: 2018-09-01

## 2018-09-01 NOTE — TELEPHONE ENCOUNTER
Pt is 27 w 2 d pregnant and had spotting yesterday and was dizzy, was instructed to wear a pad and notify if bleeding recurred today. Pt states she went into work today and was not feeling well so returned home. She wore a pad as instructed today and when she went to the bathroom saw about a quarter size amount of blood on her pad, did not see any clots. She has also been having abdominal cramping/pain at about a 4 or 5 out of 10 on the pain scale right side of her abdomen above the belly button that is intermittent cramping. Denies any dizziness today. Pt's PCP is Dr. FERNANDEZ Ledezma at the [facility].    Paged on-call Dr. Per Manning per [place] [facility] paging protocol at 5:42 pm and pt will call back in 20 minutes if she has not heard back yet from him:    messageID: [id number], Pt Ana Mauricio  96 [address] 2018 MRN [id number]. Pt has small amt of vaginal bleeding and abdominal cramping, please call pt back at [phone]. Marion Hampton RN FNA  Protocol and care advice reviewed.  Caller states understanding of the recommended disposition.   Advised to call back if further questions or concerns.      Reason for Disposition    Abdominal pain or having contractions    Additional Information    Negative: Passed out (i.e., lost consciousness, collapsed and was not responding)    Negative: Shock suspected (e.g., cold/pale/clammy skin, too weak to stand, low BP, rapid pulse)    Negative: Difficult to awaken or acting confused  (e.g., disoriented, slurred speech)    Negative: SEVERE vaginal bleeding (e.g., continuous red blood from vagina, large blood clots)    Negative: [1] SEVERE abdominal pain (e.g., excruciating) AND [2] constant AND [3] present > 1 hour    Negative: Sounds like a life-threatening emergency to the triager    Negative: [1] Vaginal bleeding AND [2] pregnant < 20 weeks    Negative: MILD-MODERATE vaginal bleeding (i.e., small to medium clots; like mild menstrual period)    Protocols used:  PREGNANCY - VAGINAL BLEEDING GREATER THAN 20 WEEKS EGA-ADULT-AH

## 2018-09-04 ENCOUNTER — HOSPITAL ENCOUNTER (OUTPATIENT)
Facility: CLINIC | Age: 23
Discharge: HOME | End: 2018-09-04
Attending: OBSTETRICS & GYNECOLOGY | Admitting: OBSTETRICS & GYNECOLOGY
Payer: COMMERCIAL

## 2018-09-04 ENCOUNTER — TELEPHONE (OUTPATIENT)
Dept: OBGYN | Facility: CLINIC | Age: 23
End: 2018-09-04

## 2018-09-04 VITALS
DIASTOLIC BLOOD PRESSURE: 71 MMHG | SYSTOLIC BLOOD PRESSURE: 119 MMHG | WEIGHT: 217 LBS | TEMPERATURE: 98.8 F | BODY MASS INDEX: 34.06 KG/M2 | HEIGHT: 67 IN | RESPIRATION RATE: 18 BRPM

## 2018-09-04 PROCEDURE — G0463 HOSPITAL OUTPT CLINIC VISIT: HCPCS

## 2018-09-04 NOTE — TELEPHONE ENCOUNTER
Pt was put on Dr Ledezma schedule at 345pm for bleeding after speaking with Dr Villalobos would like pt to go to L&D for evaluation.  Lotus Gray MA

## 2018-09-04 NOTE — IP AVS SNAPSHOT
MRN:[id number]                      After Visit Summary   9/4/2018    Ana Mauricio    MRN: [id number]           Thank you!     Thank you for choosing [facility] for your care. Our goal is always to provide you with excellent care. Hearing back from our patients is one way we can continue to improve our services. Please take a few minutes to complete the written survey that you may receive in the mail after you visit. If you would like to speak to someone directly about your visit please contact Patient Relations at [phone]. Thank you!          Patient Information     Date Of Birth          1995        About your hospital stay     You were admitted on:  September 4, 2018 You last received care in the:  [facility] Labor and Delivery    You were discharged on:  September 4, 2018       Who to Call     For medical emergencies, please call 911.  For non-urgent questions about your medical care, please call your primary care provider or clinic, [phone]          Attending Provider     Provider Specialty    Jomar Villalobos MD OB/Gyn       Primary Care Provider Office Phone # Fax #    Maggie Rachele Haase, APRN -[phone]      Your next 10 appointments already scheduled     Sep 11, 2018  2:45 PM CDT   ESTABLISHED PRENATAL with Ashley Hernandez DO   [facility]    [address] [place]  [address] [place] [id number] [phone]            Sep 25, 2018  2:45 PM CDT   MyChart OB-GYN Established Prenatal with Karen Ledezma MD   [facility]    [address] [place]  [address] [place] [id number] [phone]            Oct 10, 2018  3:00 PM CDT   MyChart OB-GYN Established Prenatal with Ashley Hernandez DO   [facility]    [address]  [address] [place] [address]    [phone]            Oct 24, 2018  3:15 PM CDT   MyChart OB-GYN Established Prenatal with Ashley Hernandez DO   [facility]    [address]  [address] [place] [address]   [phone]            Nov 05, 2018  5:15 PM CST   MyChart OB-GYN Established Prenatal with Jer Curiel MD   [facility]    [address]  [address] [place] [address]   [phone]            Nov 15, 2018  3:15 PM CST   MyChart OB-GYN Established Prenatal with Ashley Hernandez DO   [facility]    [address]  [address] [place] [address]   [phone]            Nov 19, 2018  5:15 PM CST   MyChart OB-GYN Established Prenatal with Jer Curiel MD   [facility]    [address]  [address] [address]   [phone]              Further instructions from your care team       Discharge Instruction for Undelivered Patients      You were seen for: Bleeding Assessment  We Consulted: Dr. Villalobos  You had (Test or Medicine):External fetal and uterine monitoring     Diet:   Drink 8 to 12 glasses of liquids (milk, juice, water) every day.  You may eat meals and snacks.     Activity:  Call your doctor or nurse midwife if your baby is moving less than usual.     Call your provider if you notice:  Swelling in your face or increased swelling in your hands or legs.  Headaches that are not relieved by Tylenol (acetaminophen).  Changes in your vision (blurring: seeing spots or stars.)  Nausea (sick to your stomach) and vomiting (throwing up).   Weight gain of 5 pounds or more per week.  Heartburn that doesn't go away.  Signs of bladder infection: pain when you urinate (use the toilet), need to go more often and more urgently.  The bag of carias (rupture of membranes) breaks, or you  "notice leaking in your underwear.  Bright red blood in your underwear.  Abdominal (lower belly) or stomach pain.  For first baby: Contractions (tightening) less than 5 minutes apart for one hour or more.  *If less than 34 weeks: Contractions (tightenings) more than 6 times in one hour.  Increase or change in vaginal discharge (note the color and amount)      Follow-up:  Make an appointment to be seen on 9/5/18 for an OB visit. The OB provider will make the US appointment tomorrow in the office. If bleeding reoccurs prior to appointment, call answering service to talk with nurse or provider for further evaluation.          Pending Results     No orders found from 9/2/2018 to 9/5/2018.            Admission Information     Date & Time Provider Department Dept. Phone    9/4/2018 Jomar Villalobos MD [facility] Labor and Delivery [phone]      Your Vitals Were     Blood Pressure Temperature Respirations Height Weight Last Period    119/71 98.8  F (37.1  C) (Oral) 18 1.702 m (5' 7\") 98.4 kg (217 lb) 02/22/2018    BMI (Body Mass Index)                   33.99 kg/m2           MyChart Information     [employer] gives you secure access to your electronic health record. If you see a primary care provider, you can also send messages to your care team and make appointments. If you have questions, please call your primary care clinic.  If you do not have a primary care provider, please call [phone] and they will assist you.        Care EveryWhere ID     This is your Care EveryWhere ID. This could be used by other organizations to access your [facility] medical records  [id number]        Equal Access to Services     [facility]: Hadii aad ku hadashludy [place], waaxda luqadaha, qaybta kaalmada celia, yaw kennedy. So [facility] [phone].    ATENCIÓN: Si habla español, tiene a gary disposición servicios gratuitos de asistencia lingüística. Llame al [phone].    We comply with applicable " federal civil rights laws and [place] laws. We do not discriminate on the basis of race, color, national origin, age, disability, sex, sexual orientation, or gender identity.               Review of your medicines      UNREVIEWED medicines. Ask your doctor about these medicines        Dose / Directions    CHEWABLE MULTIPLE VITAMINS OR   Used for:  Supervision of normal first pregnancy, antepartum        Refills:  0                Protect others around you: Learn how to safely use, store and throw away your medicines at www.disposemymeds.org.             Medication List: This is a list of all your medications and when to take them. Check marks below indicate your daily home schedule. Keep this list as a reference.      Medications           Morning Afternoon Evening Bedtime As Needed    CHEWABLE MULTIPLE VITAMINS OR

## 2018-09-04 NOTE — PLAN OF CARE
[facility].  Reason for maternal/fetal assessment is vaginal bleeding. Patient reports two episodes of vaginal bleeding this past weekend while at work. Friday and again on Saturday. She states she may have felt a little cramping at the time due to some heavy lifting requirements of her job. Was bright red in color on her pad, did not have any when wiping.  Patient is a .  Prenatal record reviewed. Pregnancy has been uncomplicated..  Gestational Age 27w5d. VSS. Fetal movement present. Patient denies uterine contractions, leaking of vaginal fluid/rupture of membranes, abdominal pain, pelvic pressure, nausea, vomiting, headache, visual disturbances, epigastric or URQ pain, significant edema. Support person is present.   Action: Verbal consent for EFM. Triage assessment completed. Bill of rights reviewed.  Response: Patient verbalized agreement with plan. Will contact Dr Jomar Villalobso Md with update and further orders.

## 2018-09-04 NOTE — PROVIDER NOTIFICATION
09/04/18 1730   Provider Notification   Provider Name/Title Dr. Villalobos   Method of Notification In Department   Request Evaluate - Remote   Notification Reason Lab/Diagnostic Study   Patient may discharge to home since not currently bleeding or cramping. Appropriate for gestation tracing. Orders to make an appointment to be seen by OB tomorrow in clinic where they can do a follow up US.

## 2018-09-04 NOTE — DISCHARGE INSTRUCTIONS
Discharge Instruction for Undelivered Patients      You were seen for: Bleeding Assessment  We Consulted: Dr. Villalobos  You had (Test or Medicine):External fetal and uterine monitoring     Diet:   Drink 8 to 12 glasses of liquids (milk, juice, water) every day.  You may eat meals and snacks.     Activity:  Call your doctor or nurse midwife if your baby is moving less than usual.     Call your provider if you notice:  Swelling in your face or increased swelling in your hands or legs.  Headaches that are not relieved by Tylenol (acetaminophen).  Changes in your vision (blurring: seeing spots or stars.)  Nausea (sick to your stomach) and vomiting (throwing up).   Weight gain of 5 pounds or more per week.  Heartburn that doesn't go away.  Signs of bladder infection: pain when you urinate (use the toilet), need to go more often and more urgently.  The bag of carias (rupture of membranes) breaks, or you notice leaking in your underwear.  Bright red blood in your underwear.  Abdominal (lower belly) or stomach pain.  For first baby: Contractions (tightening) less than 5 minutes apart for one hour or more.  *If less than 34 weeks: Contractions (tightenings) more than 6 times in one hour.  Increase or change in vaginal discharge (note the color and amount)      Follow-up:  Make an appointment to be seen on 9/5/18 for an OB visit. The OB provider will make the US appointment tomorrow in the office. If bleeding reoccurs prior to appointment, call answering service to talk with nurse or provider for further evaluation.

## 2018-09-04 NOTE — PROGRESS NOTES
Data: Patient assessed in the [facility] for vaginal bleeding.  Cervical exam not examined.  Membranes intact.  Contractions not present.  Action:  Presumed adequate fetal oxygenation documented (see flow record). Discharge instructions reviewed.  Patient instructed to report change in fetal movement, vaginal leaking of fluid or bleeding, abdominal pain, or any concerns related to the pregnancy to her nurse/physician.    Response: Orders to discharge home per Jomar Villalobos Md.  Patient verbalized understanding of education and verbalized agreement with plan. Discharged to home at 1750.

## 2018-09-04 NOTE — TELEPHONE ENCOUNTER
Pt will be calling back, she is to go to L&D for evaluation, Dr Villalobos is on call and that was his suggestion.  Please let pt know,.    Nataly STEPHENS R.N.  [facility] [place] [place]

## 2018-09-04 NOTE — IP AVS SNAPSHOT
[facility] Labor and Delivery    [address]    [facility] [address]    Phone:  [phone]    Fax:  [phone]                                       After Visit Summary   9/4/2018    Ana Mauricio    MRN: [id number]           After Visit Summary Signature Page     I have received my discharge instructions, and my questions have been answered. I have discussed any challenges I see with this plan with the nurse or doctor.    ..........................................................................................................................................  Patient/Patient Representative Signature      ..........................................................................................................................................  Patient Representative Print Name and Relationship to Patient    ..................................................               ................................................  Date                                            Time    ..........................................................................................................................................  Reviewed by Signature/Title    ...................................................              ..............................................  Date                                                            Time          22EPIC Rev 08/18

## 2018-09-04 NOTE — PROVIDER NOTIFICATION
09/04/18 1640   Provider Notification   Provider Name/Title Dr. Villalobos   Method of Notification Phone   Request Evaluate - Remote   Notification Reason Patient Arrived;Bleeding   Dr. Villalobos notified of patient arrival, here for vaginal bleeding over weekend. Not currently cramping or bleeding. Would like patient to get an US in OB clinic tomorrow if able rather than at the hospital. Will try to get an appointment now for patient.

## 2018-09-05 ENCOUNTER — OFFICE VISIT (OUTPATIENT)
Dept: OBGYN | Facility: CLINIC | Age: 23
End: 2018-09-05
Payer: COMMERCIAL

## 2018-09-05 VITALS — WEIGHT: 220.7 LBS | BODY MASS INDEX: 34.57 KG/M2 | DIASTOLIC BLOOD PRESSURE: 70 MMHG | SYSTOLIC BLOOD PRESSURE: 118 MMHG

## 2018-09-05 DIAGNOSIS — Z34.00 SUPERVISION OF NORMAL FIRST PREGNANCY, ANTEPARTUM: ICD-10-CM

## 2018-09-05 PROCEDURE — 99207 ZZC PRENATAL VISIT: CPT | Performed by: OBSTETRICS & GYNECOLOGY

## 2018-09-05 NOTE — NURSING NOTE
"Chief Complaint   Patient presents with     Prenatal Care   27w6d  No bleeding today.  Lotus Gray MA      Initial /70 (BP Location: Right arm, Patient Position: Chair, Cuff Size: Adult Regular)  Wt 220 lb 11.2 oz (100.1 kg)  LMP 2018  BMI 34.57 kg/m2 Estimated body mass index is 34.57 kg/(m^2) as calculated from the following:    Height as of 18: 5' 7\" (1.702 m).    Weight as of this encounter: 220 lb 11.2 oz (100.1 kg).  BP completed using cuff size: large        The following HM Due: NONE      The following patient reported/Care Every where data was sent to:  P ABSTRACT QUALITY INITIATIVES [[id number]]                      "

## 2018-09-05 NOTE — PROGRESS NOTES
Seen on L&D for vaginal bleeding last Saturday. Resolved with no bleeding since Saturday . US ordered.

## 2018-09-05 NOTE — MR AVS SNAPSHOT
After Visit Summary   9/5/2018    Ana Mauricio    MRN: [id number]           Patient Information     Date Of Birth          1995        Visit Information        Provider Department      9/5/2018 11:00 AM Jomar Villalobos MD [facility]        Today's Diagnoses     Supervision of normal first pregnancy, antepartum           Follow-ups after your visit        Your next 10 appointments already scheduled     Sep 11, 2018  2:45 PM CDT   ESTABLISHED PRENATAL with Ashley Hernandez DO   [facility]    [address]  [address] [place] [address]   [phone]            Sep 25, 2018  2:45 PM CDT   MyChart OB-GYN Established Prenatal with Karen Ledezma MD   [facility]    [address]  [address] [place] [address]   [phone]            Oct 10, 2018  3:00 PM CDT   MyChart OB-GYN Established Prenatal with Ashley Hernandez DO   [facility]    [address]  [address] [place] [address]   [phone]            Oct 24, 2018  3:15 PM CDT   MyChart OB-GYN Established Prenatal with Ashley Hernandez DO   [facility]    [address]  [address] [place] [address]   [phone]            Nov 05, 2018  5:45 PM CST   ESTABLISHED PRENATAL with Jomar Villalobos MD   [facility]    [address]  [address] [place] [address]   [phone]            Nov 15, 2018  3:15 PM CST   MyChart OB-GYN Established Prenatal with Ashley Hernandez DO   [facility]    [address]  [address] [place] [address]   [phone]            Nov 19, 2018  5:00 PM CST   ESTABLISHED PRENATAL with Jomar Villalobos MD   [place]  [facility] ([facility]    [address]  [address]  [facility] [id number] [phone]              Future tests that were ordered for you today     Open Future Orders        Priority Expected Expires Ordered    US OB Ltd One Or More Fetus FU/Repeat Routine  9/5/2019 9/5/2018            Who to contact     If you have questions or need follow up information about today's clinic visit or your schedule please contact [facility] directly at [phone].  Normal or non-critical lab and imaging results will be communicated to you by Weibuhart, letter or phone within 4 business days after the clinic has received the results. If you do not hear from us within 7 days, please contact the clinic through [facility] or phone. If you have a critical or abnormal lab result, we will notify you by phone as soon as possible.  Submit refill requests through Surgimatix or call your pharmacy and they will forward the refill request to us. Please allow 3 business days for your refill to be completed.          Additional Information About Your Visit        Surgimatix Information     S[employer]ix gives you secure access to your electronic health record. If you see a primary care provider, you can also send messages to your care team and make appointments. If you have questions, please call your primary care clinic.  If you do not have a primary care provider, please call [phone] and they will assist you.        Care EveryWhere ID     This is your Care EveryWhere ID. This could be used by other organizations to access your [facility] medical records  [id number]        Your Vitals Were     Last Period BMI (Body Mass Index)                02/22/2018 34.57 kg/m2           Blood Pressure from Last 3 Encounters:   09/05/18 118/70   09/04/18 119/71   08/16/18 122/68    Weight from Last 3 Encounters:   09/05/18 220 lb 11.2 oz (100.1 kg)   09/04/18 217 lb (98.4 kg)   08/16/18 217 lb (98.4 kg)                Primary Care Provider Office Phone # Fax #    Susan Rachele Haase, APRN [phone] [address] [facility] [address]        Equal Access to Services     CLARA JACOBSON : Hadii jillian ku hadmeñoo Soomaali, waaxda luqadaha, qaybta kaalmada adeegyada, yaw arellanon kimjoselyn smith laMiguechristine kennedy. So [facility] [phone].    ATENCIÓN: Si habla español, tiene a gary disposición servicios gratuitos de asistencia lingüística. Llame al [phone].    We comply with applicable federal civil rights laws and [place] laws. We do not discriminate on the basis of race, color, national origin, age, disability, sex, sexual orientation, or gender identity.            Thank you!     Thank you for choosing [facility]  for your care. Our goal is always to provide you with excellent care. Hearing back from our patients is one way we can continue to improve our services. Please take a few minutes to complete the written survey that you may receive in the mail after your visit with us. Thank you!             Your Updated Medication List - Protect others around you: Learn how to safely use, store and throw away your medicines at www.disposemymeds.org.          This list is accurate as of 9/5/18 11:41 AM.  Always use your most recent med list.                   Brand Name Dispense Instructions for use Diagnosis    CHEWABLE MULTIPLE VITAMINS OR       Supervision of normal first pregnancy, antepartum

## 2018-09-06 DIAGNOSIS — Z34.00 SUPERVISION OF NORMAL FIRST PREGNANCY, ANTEPARTUM: ICD-10-CM

## 2018-09-11 ENCOUNTER — PRENATAL OFFICE VISIT (OUTPATIENT)
Dept: OBGYN | Facility: CLINIC | Age: 23
End: 2018-09-11
Payer: COMMERCIAL

## 2018-09-11 VITALS
BODY MASS INDEX: 35.16 KG/M2 | SYSTOLIC BLOOD PRESSURE: 110 MMHG | WEIGHT: 224 LBS | HEIGHT: 67 IN | DIASTOLIC BLOOD PRESSURE: 64 MMHG

## 2018-09-11 DIAGNOSIS — K21.9 GASTROESOPHAGEAL REFLUX DISEASE WITHOUT ESOPHAGITIS: ICD-10-CM

## 2018-09-11 DIAGNOSIS — O36.5930 INTRAUTERINE GROWTH RESTRICTION (IUGR) AFFECTING CARE OF MOTHER, THIRD TRIMESTER, SINGLE GESTATION: ICD-10-CM

## 2018-09-11 DIAGNOSIS — Z34.00 SUPERVISION OF NORMAL FIRST PREGNANCY, ANTEPARTUM: ICD-10-CM

## 2018-09-11 DIAGNOSIS — D50.8 OTHER IRON DEFICIENCY ANEMIA: ICD-10-CM

## 2018-09-11 DIAGNOSIS — Z34.93 PRENATAL CARE IN THIRD TRIMESTER: Primary | ICD-10-CM

## 2018-09-11 LAB
ERYTHROCYTE [DISTWIDTH] IN BLOOD BY AUTOMATED COUNT: 14.4 % (ref 10–15)
HCT VFR BLD AUTO: 31 % (ref 35–47)
HGB BLD-MCNC: 9.5 G/DL (ref 11.7–15.7)
MCH RBC QN AUTO: 25.3 PG (ref 26.5–33)
MCHC RBC AUTO-ENTMCNC: 30.6 G/DL (ref 31.5–36.5)
MCV RBC AUTO: 83 FL (ref 78–100)
PLATELET # BLD AUTO: 229 10E9/L (ref 150–450)
RBC # BLD AUTO: 3.75 10E12/L (ref 3.8–5.2)
WBC # BLD AUTO: 11.2 10E9/L (ref 4–11)

## 2018-09-11 PROCEDURE — 85027 COMPLETE CBC AUTOMATED: CPT | Performed by: FAMILY MEDICINE

## 2018-09-11 PROCEDURE — 82950 GLUCOSE TEST: CPT | Performed by: FAMILY MEDICINE

## 2018-09-11 PROCEDURE — 99207 ZZC PRENATAL VISIT: CPT | Performed by: FAMILY MEDICINE

## 2018-09-11 PROCEDURE — 90471 IMMUNIZATION ADMIN: CPT | Performed by: FAMILY MEDICINE

## 2018-09-11 PROCEDURE — 86780 TREPONEMA PALLIDUM: CPT | Performed by: FAMILY MEDICINE

## 2018-09-11 PROCEDURE — 36415 COLL VENOUS BLD VENIPUNCTURE: CPT | Performed by: FAMILY MEDICINE

## 2018-09-11 PROCEDURE — 90715 TDAP VACCINE 7 YRS/> IM: CPT | Performed by: FAMILY MEDICINE

## 2018-09-11 RX ORDER — PANTOPRAZOLE SODIUM 40 MG/1
40 TABLET, DELAYED RELEASE ORAL DAILY
Qty: 90 TABLET | Refills: 3 | Status: SHIPPED | OUTPATIENT
Start: 2018-09-11 | End: 2018-10-10

## 2018-09-11 RX ORDER — FERROUS SULFATE 325(65) MG
325 TABLET ORAL
Qty: 30 TABLET | Refills: 2 | Status: SHIPPED | OUTPATIENT
Start: 2018-09-11 | End: 2019-07-31

## 2018-09-11 NOTE — PROGRESS NOTES
"CC: Here for routine prenatal visit   23 year old y/o  @ 28w5d with Estimated Date of Delivery: 2018   HPI: Pt states her feet have been causing her pain & discomfort, specifically on the soles. She has noticed mild swelling, has tried changing what shoes she wear but notes she is on her feet a lot for work & nothing seems to help. Pt has also noticed intermittent \"coughing fits\", does experience regular mild to moderate gastric reflux. Pt would like to discuss recent US results, as the tech could not tell her anything at that visit. Lastly, she would like a note for work explaining her limits/restrictions, due to difficulty lifting & pulling the heavy equipment.         This document serves as a record of the services and decisions personally performed and made by Ashley Hernandez DO. It was created on her behalf by Yasmine An, a trained medical scribe. The creation of this document is based the provider's statements to the medical scribe.  Scribe Yasmine An 2:57 PM, 2018    /64  Ht 1.702 m (5' 7\")  Wt 101.6 kg (224 lb)  LMP 2018  BMI 35.08 kg/m2  See OB flowsheet  + fetal movement, no contractions, no bleeding, no loss of fluid   Discussed monitoring fetal movement - explained kick counts, will begin monitoring      1) concerns: Foot Pain -- Advised to be measured & fitted for a supportive shoe, if no improvement a podiatry referral can be given; Gastric Reflux/Coughing -- Advised to discontinue Zantac & instead begin using daily Protonix to alleviate symptoms; Note given for work  - US 2018: \"Small AC\", repeat US with MFM in 3-4 weeks  2) Routine care: Declines genetic screening; GC - negative; GTT - pending; Tdap given  3) Return: 2 weeks      Rx:  - Protonix 40 mg 1 tab po daily, 30-60 minutes before a meal        The information in this document, created by the medical scribe for me, accurately reflects the services I personally performed and the decisions made " by me. I have reviewed and approved this document for accuracy prior to leaving the patient care area.  2:57 PM, 09/11/18    David

## 2018-09-11 NOTE — PATIENT INSTRUCTIONS
Return in 2 weeks   Return to clinic:  every 4 weeks till 30 weeks, then every 2 weeks till 36 weeks, then weekly till delivery      Phone numbers [place]:  Day/ night [phone] ask for ob triage  Emergency:  Call labor and delivery:  [phone]    What should I call about??    Contraction every 5 minutes for 1 hour 1 minute long (511), bleeding, loss of fluid, headache that doesn't resolve with tylenol, and decreased fetal movement     Start kick counts @ 26-28 weeks   Keep track of movement and discover your normal baby movement pattern   guideline is listed below  Please call if you do not feel the baby move!  We will have you come in for fetal heart rate monitoring:   Perception of at least 10 FMs during 12 hours of normal maternal activity   Perception of least 10 FMs over two hours when the mother is at rest and focused on [facility] Address   [address], [place], [place] [address] [phone]    Dr. Ashley Hernandez, DO    OB/GYN   [facility]

## 2018-09-11 NOTE — NURSING NOTE
"28w5d    Chief Complaint   Patient presents with     Prenatal Care     bottoms of feet \"on fire\"--pain with walking and lower abdomin--discuss lifting restrictions at work     Imm/Inj     tdap injection       Initial /64  Ht 5' 7\" (1.702 m)  Wt 224 lb (101.6 kg)  LMP 2018  BMI 35.08 kg/m2 Estimated body mass index is 35.08 kg/(m^2) as calculated from the following:    Height as of this encounter: 5' 7\" (1.702 m).    Weight as of this encounter: 224 lb (101.6 kg).  BP completed using cuff size: regular        The following HM Due: NONE      "

## 2018-09-11 NOTE — LETTER
9/11/2018     Ana MAGAÑA Shinebill  [address] [facility] [address]      To whom it may concern,     The above patient is pregnant with Estimated Date of Delivery: Nov 29, 2018,   She has a medical concern with cramping and certain lifting and bending movements  And is restricted to lifting less than 25 pounds.  With bending please allow time for rest after the activity.  Also restrict pushing and pulling carts to less than 50 pounds at a time.                 Sincerely,    [place] [address]  [address]  [facility] [address]  Phone: [phone]

## 2018-09-11 NOTE — MR AVS SNAPSHOT
After Visit Summary   9/11/2018    Ana Mauricio    MRN: [id number]           Patient Information     Date Of Birth          1995        Visit Information        Provider Department      9/11/2018 2:45 PM Ashley Hernandez DO [facility]        Today's Diagnoses     Prenatal care in third trimester    -  1    Supervision of normal first pregnancy, antepartum          Care Instructions    Return in 2 weeks   Return to clinic:  every 4 weeks till 30 weeks, then every 2 weeks till 36 weeks, then weekly till delivery      Phone numbers [place]:  Day/ night [phone] ask for ob triage  Emergency:  Call labor and delivery:  [phone]    What should I call about??    Contraction every 5 minutes for 1 hour 1 minute long (511), bleeding, loss of fluid, headache that doesn't resolve with tylenol, and decreased fetal movement     Start kick counts @ 26-28 weeks   Keep track of movement and discover your normal baby movement pattern   guideline is listed below  Please call if you do not feel the baby move!  We will have you come in for fetal heart rate monitoring:   Perception of at least 10 FMs during 12 hours of normal maternal activity   Perception of least 10 FMs over two hours when the mother is at rest and focused on [facility] Address   [address], [place] [address] [phone]    Dr. sAhley Hernandez,     OB/GYN   [facility]                                      Follow-ups after your visit        Your next 10 appointments already scheduled     Sep 25, 2018  2:45 PM CDT   [place] OB-[facility] Established Prenatal with Karen Ledezma MD   [facility]    [address]  [address]  [facility] [address]   [phone]            Oct 10, 2018  3:00 PM CDT   [place] OB-GYN Established Prenatal with Ashley Hernandez DO   [facility]  ([facility])    [address]  [address]  [facility] [address]   [phone]            Oct 24, 2018  3:15 PM CDT   [facility] OB-GYN Established Prenatal with Ashley Hernandez DO   [facility] ([facility])    [address]  [address] [place] [address]   [phone]            Nov 05, 2018  5:45 PM CST   ESTABLISHED PRENATAL with Jomar Villalobos MD   [facility] ([facility])    [address]  [address] [place] [address]   [phone]            Nov 15, 2018  3:15 PM CST   [facility] Established Prenatal with Ashley Hernandez DO   [facility] ([facility])    [address]  [address] [place] [address]   [phone]            Nov 19, 2018  5:00 PM CST   ESTABLISHED PRENATAL with Jomar Villalobos MD   [facility] ([facility])    [address]  [address] [place] [address]   [phone]              Who to contact     If you have questions or need follow up information about today's clinic visit or your schedule please contact [facility] directly at [phone].  Normal or non-critical lab and imaging results will be communicated to you by MyChart, letter or phone within 4 business days after the clinic has received the results. If you do not hear from us within 7 days, please contact the clinic through [employer]'s Talk[employer] or phone. If you have a critical or abnormal lab result, we will notify you by phone as soon as possible.  Submit refill requests through Touchbase or call your pharmacy and they will forward the refill request to us. Please allow 3 business days for your refill to be completed.          Additional Information About Your Visit        Touchbase Information     Touchbase gives you secure access to your electronic health record. If you see a primary care  "provider, you can also send messages to your care team and make appointments. If you have questions, please call your primary care clinic.  If you do not have a primary care provider, please call [phone] and they will assist you.        Care EveryWhere ID     This is your Care EveryWhere ID. This could be used by other organizations to access your [place] medical records  [id number]        Your Vitals Were     Height Last Period BMI (Body Mass Index)             5' 7\" (1.702 m) 02/22/2018 35.08 kg/m2          Blood Pressure from Last 3 Encounters:   09/11/18 110/64   09/05/18 118/70   09/04/18 119/71    Weight from Last 3 Encounters:   09/11/18 224 lb (101.6 kg)   09/05/18 220 lb 11.2 oz (100.1 kg)   09/04/18 217 lb (98.4 kg)              Today, you had the following     No orders found for display       Primary Care Provider Office Phone # Fax #    Susan Rachele Haase, APRN [phone]       [address] [facility] [address]        Equal Access to Services     [facility]: Hadii aad ku hadasho Soomaali, waaxda luqadaha, qaybta kaalmada adeegyada, yaw varghese haybellen adejoselyn rincon . So [facility] [phone].    ATENCIÓN: Si habla español, tiene a gary disposición servicios gratuitos de asistencia lingüística. Llame al [phone].    We comply with applicable federal civil rights laws and [place] laws. We do not discriminate on the basis of race, color, national origin, age, disability, sex, sexual orientation, or gender identity.            Thank you!     Thank you for choosing [facility]  for your care. Our goal is always to provide you with excellent care. Hearing back from our patients is one way we can continue to improve our services. Please take a few minutes to complete the written survey that you may receive in the mail after your visit with us. Thank you!             Your Updated Medication List - Protect others around you: Learn how to safely use, " store and throw away your medicines at www.disposemymeds.org.          This list is accurate as of 9/11/18  2:55 PM.  Always use your most recent med list.                   Brand Name Dispense Instructions for use Diagnosis    CHEWABLE MULTIPLE VITAMINS OR       Supervision of normal first pregnancy, antepartum

## 2018-09-12 LAB — GLUCOSE 1H P 50 G GLC PO SERPL-MCNC: 124 MG/DL (ref 60–129)

## 2018-09-13 LAB — T PALLIDUM AB SER QL: NONREACTIVE

## 2018-09-18 ENCOUNTER — TELEPHONE (OUTPATIENT)
Dept: OBGYN | Facility: CLINIC | Age: 23
End: 2018-09-18

## 2018-09-18 NOTE — TELEPHONE ENCOUNTER
Yes agree, stop protonix take benadryl,   Please also fit her in wed or Thursday to reviewe   Dr. Ashley Hernandez, DO    Obstetrics and Gynecology  [facility] [place]

## 2018-09-18 NOTE — TELEPHONE ENCOUNTER
29w5d    Pt started Protonix on Friday, started iron today.  Hives started Monday.  Worse today.  She said they are itchy and on her chest and legs.      Advised not to take protonix today and try benadryl.  Report to ED if rash, swelling, itching near mouth.      Any thoughts on alternative to protonix or any plan for her to follow?  (will call pt back, no vm so if she does not answer I told her we would mychart)  Nataly STEPHENS R.N.  [facility]

## 2018-09-19 ENCOUNTER — TELEPHONE (OUTPATIENT)
Dept: OBGYN | Facility: CLINIC | Age: 23
End: 2018-09-19

## 2018-09-19 NOTE — TELEPHONE ENCOUNTER
Pt read the [facility] message and is aware that she should be seen this week.      Debra Greenfield RN

## 2018-09-19 NOTE — TELEPHONE ENCOUNTER
Form received from: [facility]    Form requesting following info/need: STD    JESSU needed?: No    Location of form: [place] / Darien Hernandez    When completed the route for return: Fax to [facility] [phone]

## 2018-09-20 ENCOUNTER — PRENATAL OFFICE VISIT (OUTPATIENT)
Dept: OBGYN | Facility: CLINIC | Age: 23
End: 2018-09-20
Payer: COMMERCIAL

## 2018-09-20 VITALS
SYSTOLIC BLOOD PRESSURE: 118 MMHG | HEIGHT: 67 IN | DIASTOLIC BLOOD PRESSURE: 70 MMHG | BODY MASS INDEX: 35.41 KG/M2 | WEIGHT: 225.6 LBS

## 2018-09-20 DIAGNOSIS — K21.9 GASTROESOPHAGEAL REFLUX DISEASE WITHOUT ESOPHAGITIS: ICD-10-CM

## 2018-09-20 DIAGNOSIS — Z34.00 SUPERVISION OF NORMAL FIRST PREGNANCY, ANTEPARTUM: ICD-10-CM

## 2018-09-20 DIAGNOSIS — Z34.93 PRENATAL CARE IN THIRD TRIMESTER: Primary | ICD-10-CM

## 2018-09-20 PROCEDURE — 99207 ZZC PRENATAL VISIT: CPT | Performed by: FAMILY MEDICINE

## 2018-09-20 NOTE — MR AVS SNAPSHOT
After Visit Summary   9/20/2018    Ana Mauricio    MRN: [id number]           Patient Information     Date Of Birth          1995        Visit Information        Provider Department      9/20/2018 2:45 PM Ashley Hernandez,  [facility]        Today's Diagnoses     Prenatal care in third trimester    -  1    Supervision of normal first pregnancy, antepartum        Gastroesophageal reflux disease without esophagitis          Care Instructions    Return in 2 weeks   Return to clinic:  every 4 weeks till 30 weeks, then every 2 weeks till 36 weeks, then weekly till delivery      Phone numbers [place]:  Day/ night [phone] ask for ob triage  Emergency:  Call labor and delivery:  [phone]    What should I call about??    Contraction every 5 minutes for 1 hour 1 minute long (511), bleeding, loss of fluid, headache that doesn't resolve with tylenol, and decreased fetal movement     Start kick counts @ 26-28 weeks   Keep track of movement and discover your normal baby movement pattern   guideline is listed below  Please call if you do not feel the baby move!  We will have you come in for fetal heart rate monitoring:   Perception of at least 10 FMs during 12 hours of normal maternal activity   Perception of least 10 FMs over two hours when the mother is at rest and focused on [facility] Address   [address] [facility], [place] [address] [phone]    Dr. Ashley Hernandez, DO    OB/GYN   [facility]                                      Follow-ups after your visit        Your next 10 appointments already scheduled     Sep 25, 2018  2:45 PM CDT   MyChart OB-GYN Established Prenatal with Karen Ledezma MD   [facility]    [address]  [address]  [facility] [address]   [phone]            Oct 02, 2018  1:30 PM CDT   MFM US COMP with [id number]   [facility]  Maternal Fetal Medicine Ultrasound - [facility] ([facility])    [address]  [facility] [address]   [phone]           Wear comfortable clothes and leave your valuables at home.            Oct 02, 2018  2:00 PM CDT   Radiology MD with  MFDANIELLE ROSALES   [facility] Maternal Fetal Medicine - [facility] ([facility])    [address]  [facility] [address]   [phone]           Please arrive at the time given for your first appointment. This visit is used internally to schedule the physician's time during your ultrasound.            Oct 10, 2018  3:00 PM CDT   ESTABLISHED PRENATAL with Ashley Hernandez DO   [facility] ([facility])    [address]  [address]  [facility] [address]   [phone]            Oct 24, 2018  3:15 PM CDT   ESTABLISHED PRENATAL with Ashley Hernandez DO   [facility] ([facility])    [address]  [address]  [facility] [address]   [phone]            Nov 05, 2018  5:45 PM CST   ESTABLISHED PRENATAL with Jomar Villalobos MD   [facility])    [address]  [address]  [facility] [address]   [phone]            Nov 15, 2018  3:15 PM CST   ESTABLISHED PRENATAL with Ashley Hernandez DO   [facility] ([facility])    [address]  [address]  [facility] [address]   [phone]            Nov 19, 2018  5:00 PM CST   ESTABLISHED PRENATAL with Jomar Villalobos MD   [facility] ([facility])    [address]  [address]  [facility] [address]   [phone]              Who to contact     If you have questions or need follow up information about today's clinic visit or your schedule please contact [facility] directly at [phone].  Normal or non-critical lab and  "imaging results will be communicated to you by MyChart, letter or phone within 4 business days after the clinic has received the results. If you do not hear from us within 7 days, please contact the clinic through Neptune or phone. If you have a critical or abnormal lab result, we will notify you by phone as soon as possible.  Submit refill requests through Neptune or call your pharmacy and they will forward the refill request to us. Please allow 3 business days for your refill to be completed.          Additional Information About Your Visit        SebaciaharAnagran Information     Neptune gives you secure access to your electronic health record. If you see a primary care provider, you can also send messages to your care team and make appointments. If you have questions, please call your primary care clinic.  If you do not have a primary care provider, please call [phone] and they will assist you.        Care EveryWhere ID     This is your Care EveryWhere ID. This could be used by other organizations to access your [place] medical records  [id number]        Your Vitals Were     Height Last Period BMI (Body Mass Index)             5' 7\" (1.702 m) 02/22/2018 35.33 kg/m2          Blood Pressure from Last 3 Encounters:   09/20/18 118/70   09/11/18 110/64   09/05/18 118/70    Weight from Last 3 Encounters:   09/20/18 225 lb 9.6 oz (102.3 kg)   09/11/18 224 lb (101.6 kg)   09/05/18 220 lb 11.2 oz (100.1 kg)              Today, you had the following     No orders found for display         Today's Medication Changes          These changes are accurate as of 9/20/18  2:58 PM.  If you have any questions, ask your nurse or doctor.               Start taking these medicines.        Dose/Directions    ranitidine 300 MG tablet   Commonly known as:  ZANTAC   Used for:  Gastroesophageal reflux disease without esophagitis   Started by:  Ashley Hernandez,         Dose:  300 mg   Take 1 tablet (300 mg) by mouth At Bedtime "   Quantity:  90 tablet   Refills:  1            Where to get your medicines      These medications were sent to [facility] [place], [place] - [address]  [address] [facility] [address]     Phone:  [phone]     ranitidine 300 MG tablet                Primary Care Provider Office Phone # Fax #    Susan Rachele Haase, APRN -[phone]       [address]  [facility] [address]        Equal Access to Services     CLARA JACOBSON : Hadii aad ku hadasho Soomaali, waaxda luqadaha, qaybta kaalmada adeegyada, waxay idiin hayaan adeeg kharathom laeduar . So [facility] [phone].    ATENCIÓN: Si habla español, tiene a gary disposición servicios gratuitos de asistencia lingüística. [facility] [phone].    We comply with applicable federal civil rights laws and [place] laws. We do not discriminate on the basis of race, color, national origin, age, disability, sex, sexual orientation, or gender identity.            Thank you!     Thank you for choosing [facility]  for your care. Our goal is always to provide you with excellent care. Hearing back from our patients is one way we can continue to improve our services. Please take a few minutes to complete the written survey that you may receive in the mail after your visit with us. Thank you!             Your Updated Medication List - Protect others around you: Learn how to safely use, store and throw away your medicines at www.disposemymeds.org.          This list is accurate as of 9/20/18  2:58 PM.  Always use your most recent med list.                   Brand Name Dispense Instructions for use Diagnosis    CHEWABLE MULTIPLE VITAMINS OR       Supervision of normal first pregnancy, antepartum       ferrous sulfate 325 (65 Fe) MG tablet    IRON    30 tablet    Take 1 tablet (325 mg) by mouth daily (with breakfast)    Other iron deficiency anemia       pantoprazole 40 MG EC tablet    PROTONIX    90 tablet     Take 1 tablet (40 mg) by mouth daily Take 30-60 minutes before a meal.    Prenatal care in third trimester, Gastroesophageal reflux disease without esophagitis       ranitidine 300 MG tablet    ZANTAC    90 tablet    Take 1 tablet (300 mg) by mouth At Bedtime    Gastroesophageal reflux disease without esophagitis

## 2018-09-20 NOTE — LETTER
9/20/2018     Ana MAGAÑA Luly  [address] [facility] [address]      To whom it may concern,     The above patient is pregnant with Estimated Date of Delivery: Nov 29, 2018,   She has a medical concern with cramping and certain lifting and bending movements  And is restricted to lifting less than 25 pounds. Also restrict pushing and pulling carts to less than 50 pounds at a time.  She cannot bend below 90 degree angle to perform tasks.       Sincerely,          [place] [address]  [address]  [facility] [address]  Phone: [phone]

## 2018-09-20 NOTE — NURSING NOTE
"30w0d    Chief Complaint   Patient presents with     Prenatal Care     discuss medication--pt got hives and took Benadryl--they are going away slowly       Initial /70  Ht 5' 7\" (1.702 m)  Wt 225 lb 9.6 oz (102.3 kg)  LMP 2018  BMI 35.33 kg/m2 Estimated body mass index is 35.33 kg/(m^2) as calculated from the following:    Height as of this encounter: 5' 7\" (1.702 m).    Weight as of this encounter: 225 lb 9.6 oz (102.3 kg).  BP completed using cuff size: regular        The following HM Due: NONE         "

## 2018-09-20 NOTE — PATIENT INSTRUCTIONS
Return in 2 weeks   Return to clinic:  every 4 weeks till 30 weeks, then every 2 weeks till 36 weeks, then weekly till delivery      Phone numbers [place]:  Day/ night [phone] ask for ob triage  Emergency:  Call labor and delivery:  [phone]    What should I call about??    Contraction every 5 minutes for 1 hour 1 minute long (511), bleeding, loss of fluid, headache that doesn't resolve with tylenol, and decreased fetal movement     Start kick counts @ 26-28 weeks   Keep track of movement and discover your normal baby movement pattern   guideline is listed below  Please call if you do not feel the baby move!  We will have you come in for fetal heart rate monitoring:   Perception of at least 10 FMs during 12 hours of normal maternal activity   Perception of least 10 FMs over two hours when the mother is at rest and focused on [facility] Address   [address], [place], [place] [address] [phone]    Dr. Ashley Hernandez, DO    OB/GYN   [facility]

## 2018-09-20 NOTE — PROGRESS NOTES
"CC: Here for routine prenatal visit   23 year old y/o  @ 30w0d with Estimated Date of Delivery: 2018   HPI: Pt is doing well, has stopped taking Protonix & states her hives resolved with Benadryl use. She also stopped taking her iron supplement, to make sure it wouldn't make anything worse.        This document serves as a record of the services and decisions personally performed and made by Ashley Hernandez DO. It was created on her behalf by Yasmine An, a trained medical scribe. The creation of this document is based the provider's statements to the medical scribe.  Scribe Yasmine An 2:49 PM, 2018    /70  Ht 1.702 m (5' 7\")  Wt 102.3 kg (225 lb 9.6 oz)  LMP 2018  BMI 35.33 kg/m2  See OB flowsheet  + fetal movement, no contractions, no bleeding, no loss of fluid   Discussed monitoring fetal movement - aware of kick counts, reports good movement today      1) concerns: Gastric Reflux -- advised to use Zantac daily & TUMS as needed to alleviate symptoms, avoid Protonix, may restart iron supplement  - US 2018: \"Small AC\", repeat US with MFM in 3-4 weeks  2) Routine care: Declines genetic screening; GC - negative; GTT - pending; Tdap given  3) Return: 2 weeks        The information in this document, created by the medical scribe for me, accurately reflects the services I personally performed and the decisions made by me. I have reviewed and approved this document for accuracy prior to leaving the patient care area.  2:49 PM, 18    David    "

## 2018-09-25 ENCOUNTER — PRE VISIT (OUTPATIENT)
Dept: MATERNAL FETAL MEDICINE | Facility: CLINIC | Age: 23
End: 2018-09-25

## 2018-09-27 NOTE — TELEPHONE ENCOUNTER
Do not see this form. Will discuss with Hortencia next week and then call [employer] to see if they have received it.   If not will complete next week.   Sabrina Lechuga, CMA

## 2018-10-02 ENCOUNTER — OFFICE VISIT (OUTPATIENT)
Dept: MATERNAL FETAL MEDICINE | Facility: CLINIC | Age: 23
End: 2018-10-02
Attending: FAMILY MEDICINE
Payer: COMMERCIAL

## 2018-10-02 ENCOUNTER — DOCUMENTATION ONLY (OUTPATIENT)
Dept: OTHER | Facility: CLINIC | Age: 23
End: 2018-10-02

## 2018-10-02 ENCOUNTER — HOSPITAL ENCOUNTER (OUTPATIENT)
Dept: ULTRASOUND IMAGING | Facility: CLINIC | Age: 23
Discharge: HOME | End: 2018-10-02
Attending: FAMILY MEDICINE | Admitting: FAMILY MEDICINE
Payer: COMMERCIAL

## 2018-10-02 DIAGNOSIS — O26.90 PREGNANCY RELATED CONDITION, ANTEPARTUM: ICD-10-CM

## 2018-10-02 DIAGNOSIS — O36.5910 MATERNAL CARE FOR OTHER KNOWN OR SUSPECTED POOR FETAL GROWTH, FIRST TRIMESTER, NOT APPLICABLE OR UNSPECIFIED: Primary | ICD-10-CM

## 2018-10-02 PROCEDURE — 76811 OB US DETAILED SNGL FETUS: CPT

## 2018-10-02 NOTE — MR AVS SNAPSHOT
After Visit Summary   10/2/2018    Ana Mauricio    MRN: [id number]           Patient Information     Date Of Birth          1995        Visit Information        Provider Department      10/2/2018 2:00 PM Tima Kim MD [facility] Maternal Fetal Medicine - [place]        Today's Diagnoses     Maternal care for other known or suspected poor fetal growth, first trimester, not applicable or unspecified    -  1       Follow-ups after your visit        Your next 10 appointments already scheduled     Oct 10, 2018  3:00 PM CDT   ESTABLISHED PRENATAL with Ashley Hernandez DO   [facility]    [address]  [address] [place] [address]   [phone]            Oct 24, 2018  3:15 PM CDT   ESTABLISHED PRENATAL with Ashley Hernandez DO   [facility])    [address]  [address] [place] [address]   [phone]            Nov 05, 2018  5:45 PM CST   ESTABLISHED PRENATAL with Jomar Villalobos MD   [facility]    [address]  [address] [place] [address]   [phone]            Nov 15, 2018  3:15 PM CST   ESTABLISHED PRENATAL with Ashley Hernandez DO   [facility])    [address]  [address] [place] [address]   [phone]            Nov 19, 2018  5:00 PM CST   ESTABLISHED PRENATAL with Jomar Villalobos MD   [facility]    [address]  [address] [place] [address]   [phone]              Future tests that were ordered for you today     Open Future Orders        Priority Expected Expires Ordered    M  Comprehensive Single F/U Routine  10/2/2019 10/2/2018            Who to contact     If you have questions or need follow up information about today's clinic visit or your  schedule please contact [facility] directly at [phone].  Normal or non-critical lab and imaging results will be communicated to you by MyChart, letter or phone within 4 business days after the clinic has received the results. If you do not hear from us within 7 days, please contact the clinic through Joosyhart or phone. If you have a critical or abnormal lab result, we will notify you by phone as soon as possible.  Submit refill requests through Vontu or call your pharmacy and they will forward the refill request to us. Please allow 3 business days for your refill to be completed.          Additional Information About Your Visit        Vontu Information     Vontu gives you secure access to your electronic health record. If you see a primary care provider, you can also send messages to your care team and make appointments. If you have questions, please call your primary care clinic.  If you do not have a primary care provider, please call [phone] and they will assist you.        Care EveryWhere ID     This is your Care EveryWhere ID. This could be used by other organizations to access your [place] medical records  [id number]        Your Vitals Were     Last Period                   02/22/2018            Blood Pressure from Last 3 Encounters:   09/20/18 118/70   09/11/18 110/64   09/05/18 118/70    Weight from Last 3 Encounters:   09/20/18 102.3 kg (225 lb 9.6 oz)   09/11/18 101.6 kg (224 lb)   09/05/18 100.1 kg (220 lb 11.2 oz)               Primary Care Provider Office Phone # Fax #    Susan Rachele Haase, APRN [phone]       [address] [facility] [address]        Equal Access to Services     CLARA JACOBSON : Hadii jillian Zuluaga, waaxda luqadaha, qaybta kaalmada celia, yaw kennedy. So [facility] [phone].    ATENCIÓN: Si habla español, tiene a gary disposición servicios gratuitos de asistencia  lingüística. Janel al [phone].    We comply with applicable federal civil rights laws and [place] laws. We do not discriminate on the basis of race, color, national origin, age, disability, sex, sexual orientation, or gender identity.            Thank you!     Thank you for choosing [facility]  for your care. Our goal is always to provide you with excellent care. Hearing back from our patients is one way we can continue to improve our services. Please take a few minutes to complete the written survey that you may receive in the mail after your visit with us. Thank you!             Your Updated Medication List - Protect others around you: Learn how to safely use, store and throw away your medicines at www.disposemymeds.org.          This list is accurate as of 10/2/18  2:26 PM.  Always use your most recent med list.                   Brand Name Dispense Instructions for use Diagnosis    CHEWABLE MULTIPLE VITAMINS OR       Supervision of normal first pregnancy, antepartum       ferrous sulfate 325 (65 Fe) MG tablet    IRON    30 tablet    Take 1 tablet (325 mg) by mouth daily (with breakfast)    Other iron deficiency anemia       pantoprazole 40 MG EC tablet    PROTONIX    90 tablet    Take 1 tablet (40 mg) by mouth daily Take 30-60 minutes before a meal.    Prenatal care in third trimester, Gastroesophageal reflux disease without esophagitis       ranitidine 300 MG tablet    ZANTAC    90 tablet    Take 1 tablet (300 mg) by mouth At Bedtime    Gastroesophageal reflux disease without esophagitis

## 2018-10-03 ENCOUNTER — PRENATAL OFFICE VISIT (OUTPATIENT)
Dept: OBGYN | Facility: CLINIC | Age: 23
End: 2018-10-03
Payer: COMMERCIAL

## 2018-10-03 VITALS — DIASTOLIC BLOOD PRESSURE: 70 MMHG | BODY MASS INDEX: 35.08 KG/M2 | WEIGHT: 224 LBS | SYSTOLIC BLOOD PRESSURE: 118 MMHG

## 2018-10-03 DIAGNOSIS — Z34.00 SUPERVISION OF NORMAL FIRST PREGNANCY, ANTEPARTUM: Primary | ICD-10-CM

## 2018-10-03 PROCEDURE — 99207 ZZC PRENATAL VISIT: CPT | Performed by: ADVANCED PRACTICE MIDWIFE

## 2018-10-03 RX ORDER — METOCLOPRAMIDE 10 MG/1
10 TABLET ORAL SEE ADMIN INSTRUCTIONS
Qty: 33 TABLET | Refills: 1 | Status: SHIPPED | OUTPATIENT
Start: 2018-10-03 | End: 2018-10-15

## 2018-10-03 NOTE — LETTER
October 3, 2018      Ana MAGAÑA [place]rist[place]  [address] [facility] [address]        To Whom It May Concern,     Due to complications of pregnancy, please limit patients work hours to no more than 40hrs per week.       Sincerely,        KELLIE Bacon CNM

## 2018-10-03 NOTE — TELEPHONE ENCOUNTER
Forms found and completed. Need to have patient sign the release section and then have Dr. Ledezma sign. Forms can then be faxed back to [employer]. Patient has appt next Wednesday 10/10 with KT, will hold and have patient sign then and then fax to [employer] after.   Sabrina Lechuga CMA

## 2018-10-03 NOTE — NURSING NOTE
"Chief Complaint   Patient presents with     Prenatal Care     31 weeks 6 days- concners of dizziness and pressure in sinuses      Rectal Problem     Blood in stool       Initial /70 (BP Location: Left arm, Patient Position: Sitting, Cuff Size: Adult Regular)  Wt 224 lb (101.6 kg)  LMP 2018  Breastfeeding? No  BMI 35.08 kg/m2 Estimated body mass index is 35.08 kg/(m^2) as calculated from the following:    Height as of 18: 5' 7\" (1.702 m).    Weight as of this encounter: 224 lb (101.6 kg).  BP completed using cuff size: regular        The following HM Due: NONE    31w6d  Timo Harley CMA                "

## 2018-10-03 NOTE — PROGRESS NOTES
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 No fever or flu-like symptoms. Denies s/s of PIH, BP normal. Patient asking to have a note to work less hours. Baby active.  Denies uterine cramping, vaginal bleeding or leaking of fluid  O: Vitals: /70 (BP Location: Left arm, Patient Position: Sitting, Cuff Size: Adult Regular)  Wt 224 lb (101.6 kg)  LMP 02/22/2018  Breastfeeding? No  BMI 35.08 kg/m2  BMI= Body mass index is 35.08 kg/(m^2).  Exam:  Constitutional: healthy, alert and no distress  Respiratory: respirations even and unlabored  Gastrointestinal: Abdomen soft, non-tender. Fundus measures appropriate for gestational age. Fetal heart tones hear without difficulty and within normal limits  : Deferred  Psychiatric: mentation appears normal and affect normal/bright  A:     ICD-10-CM    1. Supervision of normal first pregnancy, antepartum Z34.00 metoclopramide (REGLAN) 10 MG tablet     P: Discussed comfort measures for symptoms. Reviewed warning signs of sinus infection and to go to urgent care or PCP if those symptoms occur.  Rx: Reglan 10mg PO q8hrs as needed.   Note to work no more than 3 days a week given to patient.   Encouraged patient to call with any questions or concerns.  Return to clinic at next scheduled visit.     Emily Hinton CNM              "

## 2018-10-03 NOTE — LETTER
October 3, 2018      Ana MAGAÑA [place]  [address] [facility] [address]        To Whom It May Concern,     Due to complications of pregnancy, please allow patient to work no more than 3 days per week.       Sincerely,        KELLIE BaconM

## 2018-10-03 NOTE — MR AVS SNAPSHOT
After Visit Summary   10/3/2018    Ana Mauricio    MRN: [id number]           Patient Information     Date Of Birth          1995        Visit Information        Provider Department      10/3/2018 10:15 AM Emily Hinton APRN CNM [facility]        Today's Diagnoses     Supervision of normal first pregnancy, antepartum    -  1       Follow-ups after your visit        Follow-up notes from your care team     Return in about 1 week (around 10/10/2018) for Prenatal Visit.      Your next 10 appointments already scheduled     Oct 10, 2018  3:00 PM CDT   ESTABLISHED PRENATAL with Ashley Hernandez DO   [facility] ([facility])    [address]  [address]  [facility] [address]   [phone]            Oct 24, 2018  3:15 PM CDT   ESTABLISHED PRENATAL with Ashley Hernandez DO   [facility] ([facility])    [address]  [address]  [facility] [address]   [phone]            Nov 05, 2018  5:45 PM CST   ESTABLISHED PRENATAL with Jomar Villalobos MD   [facility] ([facility])    [address]  [address]  [facility] [address]   [phone]            Nov 06, 2018  1:30 PM CST   MFM US COMPRE SINGLE F/U with [id number]   [facility] Maternal Fetal Medicine Ultrasound - [facility])    [address]  [facility] [address] [phone]           Wear comfortable clothes and leave your valuables at home.            Nov 06, 2018  2:00 PM CST   Radiology MD with  MFDANIELLE ROSALES   [facility] Maternal Fetal Medicine - [facility])    [address]  [facility] [address] [phone]           Please arrive at the time given for your first appointment. This visit is used internally to schedule the physician's time during your ultrasound.            Nov 15, 2018  3:15 PM  CST   ESTABLISHED PRENATAL with Ashley Hernandez DO   [facility]    [address]  [address]  [facility] [id number] [phone]            Nov 19, 2018  5:00 PM CST   ESTABLISHED PRENATAL with Jomar Villalobos MD   [facility]    [address]  [address]  [facility] [id number] [phone]              Future tests that were ordered for you today     Open Future Orders        Priority Expected Expires Ordered    MFM US Comprehensive Single F/U Routine  10/2/2019 10/2/2018            Who to contact     If you have questions or need follow up information about today's clinic visit or your schedule please contact [facility] directly at [phone].  Normal or non-critical lab and imaging results will be communicated to you by MyChart, letter or phone within 4 business days after the clinic has received the results. If you do not hear from us within 7 days, please contact the clinic through Weixinhai[employer] or phone. If you have a critical or abnormal lab result, we will notify you by phone as soon as possible.  Submit refill requests through [employer] or call your pharmacy and they will forward the refill request to us. Please allow 3 business days for your refill to be completed.          Additional Information About Your Visit        [employer] Information     [employer] gives you secure access to your electronic health record. If you see a primary care provider, you can also send messages to your care team and make appointments. If you have questions, please call your primary care clinic.  If you do not have a primary care provider, please call [phone] and they will assist you.        Care EveryWhere ID     This is your Care EveryWhere ID. This could be used by other organizations to access your [place] medical records  [id number]        Your Vitals Were     Last Period  Breastfeeding? BMI (Body Mass Index)             02/22/2018 No 35.08 kg/m2          Blood Pressure from Last 3 Encounters:   10/03/18 118/70   09/20/18 118/70   09/11/18 110/64    Weight from Last 3 Encounters:   10/03/18 224 lb (101.6 kg)   09/20/18 225 lb 9.6 oz (102.3 kg)   09/11/18 224 lb (101.6 kg)              Today, you had the following     No orders found for display         Today's Medication Changes          These changes are accurate as of 10/3/18 11:39 AM.  If you have any questions, ask your nurse or doctor.               Start taking these medicines.        Dose/Directions    metoclopramide 10 MG tablet   Commonly known as:  REGLAN   Used for:  Supervision of normal first pregnancy, antepartum   Started by:  Emily Hinton APRN CNM        Dose:  10 mg   Take 1 tablet (10 mg) by mouth See Admin Instructions for 12 days   Quantity:  33 tablet   Refills:  1            Where to get your medicines      These medications were sent to [facility] [address] [place] [address] [address] [facility] [address]     Phone:  [phone]     metoclopramide 10 MG tablet                Primary Care Provider Office Phone # Fax #    Susan Rachele Haase, APRN [phone] [address] [facility] [address]        Equal Access to Services     [facility]: Hadii aad ku hadasho Soomaali, waaxda luqadaha, qaybta kaalmada adeegyada, yaw varghese haychristine rincon . So [facility] [phone].    ATENCIÓN: Si habla español, tiene a gary disposición servicios gratuitos de asistencia lingüística. Ashleyame al [phone].    We comply with applicable federal civil rights laws and [place] laws. We do not discriminate on the basis of race, color, national origin, age, disability, sex, sexual orientation, or gender identity.            Thank you!     Thank you for choosing [facility]  for your care. Our goal is always to provide you  with excellent care. Hearing back from our patients is one way we can continue to improve our services. Please take a few minutes to complete the written survey that you may receive in the mail after your visit with us. Thank you!             Your Updated Medication List - Protect others around you: Learn how to safely use, store and throw away your medicines at www.disposemymeds.org.          This list is accurate as of 10/3/18 11:39 AM.  Always use your most recent med list.                   Brand Name Dispense Instructions for use Diagnosis    CHEWABLE MULTIPLE VITAMINS OR       Supervision of normal first pregnancy, antepartum       ferrous sulfate 325 (65 Fe) MG tablet    IRON    30 tablet    Take 1 tablet (325 mg) by mouth daily (with breakfast)    Other iron deficiency anemia       metoclopramide 10 MG tablet    REGLAN    33 tablet    Take 1 tablet (10 mg) by mouth See Admin Instructions for 12 days    Supervision of normal first pregnancy, antepartum       pantoprazole 40 MG EC tablet    PROTONIX    90 tablet    Take 1 tablet (40 mg) by mouth daily Take 30-60 minutes before a meal.    Prenatal care in third trimester, Gastroesophageal reflux disease without esophagitis       ranitidine 300 MG tablet    ZANTAC    90 tablet    Take 1 tablet (300 mg) by mouth At Bedtime    Gastroesophageal reflux disease without esophagitis

## 2018-10-10 ENCOUNTER — PRENATAL OFFICE VISIT (OUTPATIENT)
Dept: OBGYN | Facility: CLINIC | Age: 23
End: 2018-10-10
Payer: COMMERCIAL

## 2018-10-10 VITALS — SYSTOLIC BLOOD PRESSURE: 120 MMHG | WEIGHT: 228 LBS | BODY MASS INDEX: 35.71 KG/M2 | DIASTOLIC BLOOD PRESSURE: 60 MMHG

## 2018-10-10 DIAGNOSIS — Z34.00 SUPERVISION OF NORMAL FIRST PREGNANCY, ANTEPARTUM: ICD-10-CM

## 2018-10-10 PROCEDURE — 99207 ZZC PRENATAL VISIT: CPT | Performed by: OBSTETRICS & GYNECOLOGY

## 2018-10-10 NOTE — PROGRESS NOTES
"CC: Here for routine prenatal visit   23 year old y/o  @ 32w6d with Estimated Date of Delivery: 2018   HPI: No problems  LMP 2018  See OB flowsheet  + fetal movement, no contractions, no bleeding, no loss of fluid   Discussed monitoring fetal movement       1) concerns: none  - US 2018: \"Small AC\", repeat US with MFM in 3-4 weeks   > Resolved at [facility] US 10/02/2018  2) Routine care: Declines genetic screening; GC - negative; GTT - normal; Tdap given  3) Return: 2 weeks.  Will get Flu shot at [employer] where she works    Axel Manning MD        "

## 2018-10-10 NOTE — MR AVS SNAPSHOT
After Visit Summary   10/10/2018    Ana Mauricio    MRN: [id number]           Patient Information     Date Of Birth          1995        Visit Information        Provider Department      10/10/2018 3:00 PM Per Manning MD [facility]        Today's Diagnoses     Supervision of normal first pregnancy, antepartum           Follow-ups after your visit        Your next 10 appointments already scheduled     Oct 24, 2018  3:15 PM CDT   ESTABLISHED PRENATAL with Ashley Hernandez DO   [facility] ([facility])    [address]  [address] [place] [address]   [phone]            Nov 05, 2018  5:45 PM CST   ESTABLISHED PRENATAL with Jomar Villalobos MD   [facility] ([facility])    [address]  [address] [place] [id number] [phone]            Nov 06, 2018  1:30 PM CST   MFM US COMPRE SINGLE F/U with [id number]   [facility] Maternal Fetal Medicine Ultrasound - [facility])    [address] [place] [address] [phone]           Wear comfortable clothes and leave your valuables at home.            Nov 06, 2018  2:00 PM CST   Radiology MD with  MFM MD   [facility] Maternal Fetal Medicine [facility])    [address] [place] [id number] [phone]           Please arrive at the time given for your first appointment. This visit is used internally to schedule the physician's time during your ultrasound.            Nov 15, 2018  3:15 PM CST   ESTABLISHED PRENATAL with Ashley Hernandez DO   [facility] ([facility])    [address]  [address] [place] [address]   [phone]            Nov 19, 2018  5:00 PM CST   ESTABLISHED PRENATAL with Jomar Villalobos MD   [facility] ([facility])     [address]  [address]  [facility] [address]   [phone]              Who to contact     If you have questions or need follow up information about today's clinic visit or your schedule please contact [facility] directly at [phone].  Normal or non-critical lab and imaging results will be communicated to you by MyChart, letter or phone within 4 business days after the clinic has received the results. If you do not hear from us within 7 days, please contact the clinic through Bright[employer]mdhart or phone. If you have a critical or abnormal lab result, we will notify you by phone as soon as possible.  Submit refill requests through [employer] or call your pharmacy and they will forward the refill request to us. Please allow 3 business days for your refill to be completed.          Additional Information About Your Visit        MyChart Information     [employer] gives you secure access to your electronic health record. If you see a primary care provider, you can also send messages to your care team and make appointments. If you have questions, please call your primary care clinic.  If you do not have a primary care provider, please call [phone] and they will assist you.        Care EveryWhere ID     This is your Care EveryWhere ID. This could be used by other organizations to access your [place] medical records  [id number]        Your Vitals Were     Last Period Breastfeeding? BMI (Body Mass Index)             02/22/2018 No 35.71 kg/m2          Blood Pressure from Last 3 Encounters:   10/10/18 120/60   10/03/18 118/70   09/20/18 118/70    Weight from Last 3 Encounters:   10/10/18 228 lb (103.4 kg)   10/03/18 224 lb (101.6 kg)   09/20/18 225 lb 9.6 oz (102.3 kg)              Today, you had the following     No orders found for display         Today's Medication Changes          These changes are accurate as of 10/10/18  3:06 PM.  If you have any questions, ask your nurse or doctor.                Stop taking these medicines if you haven't already. Please contact your care team if you have questions.     pantoprazole 40 MG EC tablet   Commonly known as:  PROTONIX   Stopped by:  Per Manning MD                    Primary Care Provider Office Phone # Fax #    Susan Rachele Haase, APRN [phone] [address] [facility] [address]        Equal Access to Services     [facility]: Hadii aad ku hadasho Soomaali, waaxda luqadaha, qaybta kaalmada adeegyada, waxay idiin hayaan adeeg khkennash la'aan . So [facility] [phone].    ATENCIÓN: Si tuliola espethan, tiene a gary disposición servicios gratuitos de asistencia lingüística. Ashleyame al [phone].    We comply with applicable federal civil rights laws and [place] laws. We do not discriminate on the basis of race, color, national origin, age, disability, sex, sexual orientation, or gender identity.            Thank you!     Thank you for choosing [facility]  for your care. Our goal is always to provide you with excellent care. Hearing back from our patients is one way we can continue to improve our services. Please take a few minutes to complete the written survey that you may receive in the mail after your visit with us. Thank you!             Your Updated Medication List - Protect others around you: Learn how to safely use, store and throw away your medicines at www.disposemymeds.org.          This list is accurate as of 10/10/18  3:06 PM.  Always use your most recent med list.                   Brand Name Dispense Instructions for use Diagnosis    CHEWABLE MULTIPLE VITAMINS OR       Supervision of normal first pregnancy, antepartum       ferrous sulfate 325 (65 Fe) MG tablet    IRON    30 tablet    Take 1 tablet (325 mg) by mouth daily (with breakfast)    Other iron deficiency anemia       metoclopramide 10 MG tablet    REGLAN    33 tablet    Take 1 tablet (10 mg) by mouth See Admin Instructions for 12  days    Supervision of normal first pregnancy, antepartum       ranitidine 300 MG tablet    ZANTAC    90 tablet    Take 1 tablet (300 mg) by mouth At Bedtime    Gastroesophageal reflux disease without esophagitis

## 2018-10-10 NOTE — NURSING NOTE
"Chief Complaint   Patient presents with     Prenatal Care     32 weeks 6 days- no concerns        Initial /60 (BP Location: Left arm, Patient Position: Sitting, Cuff Size: Adult Regular)  Wt 228 lb (103.4 kg)  LMP 2018  Breastfeeding? No  BMI 35.71 kg/m2 Estimated body mass index is 35.71 kg/(m^2) as calculated from the following:    Height as of 18: 5' 7\" (1.702 m).    Weight as of this encounter: 228 lb (103.4 kg).  BP completed using cuff size: regular        The following HM Due: NONE    32w6d  Timo Harley CMA              "

## 2018-10-16 NOTE — TELEPHONE ENCOUNTER
Patient did not sign forms at her appt on 10/10. She will sign the release section when she see's KT on 10/25 and then we can have KT sign forms and they will be faxed back to [employer]. Forms will be given to Luana HDZ to hold on to until patients next appt.   Sabrina Lechuga CMA

## 2018-10-25 ENCOUNTER — PRENATAL OFFICE VISIT (OUTPATIENT)
Dept: OBGYN | Facility: CLINIC | Age: 23
End: 2018-10-25
Payer: COMMERCIAL

## 2018-10-25 VITALS
SYSTOLIC BLOOD PRESSURE: 120 MMHG | HEART RATE: 98 BPM | BODY MASS INDEX: 35.87 KG/M2 | DIASTOLIC BLOOD PRESSURE: 68 MMHG | WEIGHT: 229 LBS

## 2018-10-25 DIAGNOSIS — Z34.00 SUPERVISION OF NORMAL FIRST PREGNANCY, ANTEPARTUM: Primary | ICD-10-CM

## 2018-10-25 PROCEDURE — 99207 ZZC PRENATAL VISIT: CPT | Performed by: OBSTETRICS & GYNECOLOGY

## 2018-10-25 PROCEDURE — 87653 STREP B DNA AMP PROBE: CPT | Performed by: OBSTETRICS & GYNECOLOGY

## 2018-10-25 NOTE — PROGRESS NOTES
"CC: Here for routine prenatal visit   23 year old y/o  @ 35w0d with Estimated Date of Delivery: 2018   HPI: No problems  /68 (BP Location: Right arm, Patient Position: Chair, Cuff Size: Adult Large)  Pulse 98  Wt 229 lb (103.9 kg)  LMP 2018  BMI 35.87 kg/m2  See OB flowsheet  + fetal movement, no contractions, no bleeding, no loss of fluid   Discussed PTL signs.    1) concerns: none  - US 2018: \"Small AC\" > Resolved at [facility] US 10/02/2018, repeat US with [facility] in 3-4 weeks  2) Routine care: Declines genetic screening; GC - negative; GTT - normal; Tdap given  3) Return: 1 - 2 weeks.     Karen Ledezma MD         "

## 2018-10-25 NOTE — MR AVS SNAPSHOT
After Visit Summary   10/25/2018    Ana Mauricio    MRN: [id number]           Patient Information     Date Of Birth          1995        Visit Information        Provider Department      10/25/2018 1:15 PM Karen Ledezma MD [facility]        Today's Diagnoses     Supervision of normal first pregnancy, antepartum    -  1       Follow-ups after your visit        Your next 10 appointments already scheduled     Nov 06, 2018  1:30 PM CST   MFM US COMPRE SINGLE F/U with [id number]   [facility] Maternal Fetal Medicine Ultrasound - [facility])    [address]  [facility] [address] [phone]           Wear comfortable clothes and leave your valuables at home.            Nov 06, 2018  2:00 PM CST   Radiology MD with  MFDANIELLE ROSALES   [facility] Maternal Fetal Medicine [facility])    [address]  [facility] [address] [phone]           Please arrive at the time given for your first appointment. This visit is used internally to schedule the physician's time during your ultrasound.            Nov 08, 2018 11:45 AM CST   MyChart OB-GYN Established Prenatal with Karen Ledezma MD   [facility] ([facility])    [address] [place]  [address]  [facility] [address] [phone]            Nov 15, 2018  3:15 PM CST   ESTABLISHED PRENATAL with Ashley Hernandez DO   [facility] ([facility])    [address] [place]  [address]  [facility] [address] [phone]            Nov 20, 2018 11:00 AM CST   MyChart OB-GYN Established Prenatal with Karen Ledezma MD   [facility] ([facility])    [address] [place]  [address]  [facility] [address] [phone]              Who to contact     If you have questions or need follow up information about today's clinic visit or your schedule please  contact [facility] directly at [phone].  Normal or non-critical lab and imaging results will be communicated to you by MyChart, letter or phone within 4 business days after the clinic has received the results. If you do not hear from us within 7 days, please contact the clinic through MyChart or phone. If you have a critical or abnormal lab result, we will notify you by phone as soon as possible.  Submit refill requests through [email or web address] or call your pharmacy and they will forward the refill request to us. Please allow 3 business days for your refill to be completed.          Additional Information About Your Visit        [employer] Information     [email or web address] gives you secure access to your electronic health record. If you see a primary care provider, you can also send messages to your care team and make appointments. If you have questions, please call your primary care clinic.  If you do not have a primary care provider, please call [phone] and they will assist you.        Care EveryWhere ID     This is your Care EveryWhere ID. This could be used by other organizations to access your [place] medical records  [id number]        Your Vitals Were     Pulse Last Period BMI (Body Mass Index)             98 02/22/2018 35.87 kg/m2          Blood Pressure from Last 3 Encounters:   10/25/18 120/68   10/10/18 120/60   10/03/18 118/70    Weight from Last 3 Encounters:   10/25/18 229 lb (103.9 kg)   10/10/18 228 lb (103.4 kg)   10/03/18 224 lb (101.6 kg)              We Performed the Following     Strep, Group B by [facility]        Primary Care Provider Office Phone # Fax #    Susan Rachele Haase, APRN [phone] [address] [facility] [address]        Equal Access to Services     ANDREA JACOBSON AH: Armando Zuluaga, waaxda luqadaha, qaybta kaalmada celia, yaw kennedy. So [facility] [phone].    ATENCIÓN: Si yossi santiago, ronnell marques gary  disposición servicios gratuitos de asistencia lingüística. Janel deleon [phone].    We comply with applicable federal civil rights laws and [place] laws. We do not discriminate on the basis of race, color, national origin, age, disability, sex, sexual orientation, or gender identity.            Thank you!     Thank you for choosing [facility]  for your care. Our goal is always to provide you with excellent care. Hearing back from our patients is one way we can continue to improve our services. Please take a few minutes to complete the written survey that you may receive in the mail after your visit with us. Thank you!             Your Updated Medication List - Protect others around you: Learn how to safely use, store and throw away your medicines at www.disposemymeds.org.          This list is accurate as of 10/25/18  1:25 PM.  Always use your most recent med list.                   Brand Name Dispense Instructions for use Diagnosis    CHEWABLE MULTIPLE VITAMINS OR       Supervision of normal first pregnancy, antepartum       ferrous sulfate 325 (65 Fe) MG tablet    IRON    30 tablet    Take 1 tablet (325 mg) by mouth daily (with breakfast)    Other iron deficiency anemia       ranitidine 300 MG tablet    ZANTAC    90 tablet    Take 1 tablet (300 mg) by mouth At Bedtime    Gastroesophageal reflux disease without esophagitis

## 2018-10-25 NOTE — NURSING NOTE
"Chief Complaint   Patient presents with     Prenatal Care     GBS - baby active and moving      35w0d    Initial /68 (BP Location: Right arm, Patient Position: Chair, Cuff Size: Adult Large)  Pulse 98  Wt 229 lb (103.9 kg)  LMP 02/22/2018  BMI 35.87 kg/m2 Estimated body mass index is 35.87 kg/(m^2) as calculated from the following:    Height as of 9/20/18: 5' 7\" (1.702 m).    Weight as of this encounter: 229 lb (103.9 kg).  Medication Reconciliation: complete     Caite Guzmán ma      "

## 2018-10-26 LAB
GP B STREP DNA SPEC QL NAA+PROBE: NEGATIVE
SPECIMEN SOURCE: NORMAL

## 2018-10-31 ENCOUNTER — TELEPHONE (OUTPATIENT)
Dept: OBGYN | Facility: CLINIC | Age: 23
End: 2018-10-31

## 2018-10-31 ENCOUNTER — PRENATAL OFFICE VISIT (OUTPATIENT)
Dept: OBGYN | Facility: CLINIC | Age: 23
End: 2018-10-31
Payer: COMMERCIAL

## 2018-10-31 VITALS — WEIGHT: 230 LBS | DIASTOLIC BLOOD PRESSURE: 82 MMHG | BODY MASS INDEX: 36.02 KG/M2 | SYSTOLIC BLOOD PRESSURE: 112 MMHG

## 2018-10-31 DIAGNOSIS — Z34.03 ENCOUNTER FOR PRENATAL CARE IN THIRD TRIMESTER OF FIRST PREGNANCY: Primary | ICD-10-CM

## 2018-10-31 PROBLEM — Z34.00 PRENATAL CARE, FIRST PREGNANCY: Status: ACTIVE | Noted: 2018-10-31

## 2018-10-31 PROCEDURE — 99207 ZZC COMPLICATED OB VISIT: CPT | Performed by: OBSTETRICS & GYNECOLOGY

## 2018-10-31 NOTE — NURSING NOTE
"Chief Complaint   Patient presents with     Prenatal Care       Initial /82  Wt 230 lb (104.3 kg)  LMP 2018  BMI 36.02 kg/m2 Estimated body mass index is 36.02 kg/(m^2) as calculated from the following:    Height as of 18: 5' 7\" (1.702 m).    Weight as of this encounter: 230 lb (104.3 kg).  BP completed using cuff size: regular            35w6d      Florence Obando [facility]          "

## 2018-10-31 NOTE — MR AVS SNAPSHOT
After Visit Summary   10/31/2018    Ana Mauricio    MRN: [id number]           Patient Information     Date Of Birth          1995        Visit Information        Provider Department      10/31/2018 2:15 PM Jomar Villalobos MD [facility]        Today's Diagnoses     Encounter for prenatal care in third trimester of first pregnancy    -  1       Follow-ups after your visit        Your next 10 appointments already scheduled     Nov 06, 2018  1:30 PM CST   MFM US COMPRE SINGLE F/U with [id number]   [facility] Maternal Fetal Medicine Ultrasound - [facility])    [address]  [facility] [address]   [phone]           Wear comfortable clothes and leave your valuables at home.            Nov 06, 2018  2:00 PM CST   Radiology MD with  MFDANIELLE ROSALES   [facility] Maternal Fetal Medicine - [facility])    [address]  [facility] [id number] [phone]           Please arrive at the time given for your first appointment. This visit is used internally to schedule the physician's time during your ultrasound.            Nov 08, 2018 11:45 AM CST   MyChart OB-GYN Established Prenatal with Karen Ledezma MD   [facility] ([facility])    [address]  [address]  [facility] [id number] [phone]            Nov 15, 2018  3:15 PM CST   ESTABLISHED PRENATAL with Ashley Hernandez DO   [facility] ([facility])    [address]  [address]  [facility] [id number] [phone]            Nov 20, 2018 11:00 AM CST   MyChart OB-GYN Established Prenatal with Karen Ledezma MD   [facility] ([facility])    [address]  [address]  [facility] [id number] [phone]              Who to contact     If you have questions or need follow up information about today's clinic visit or  your schedule please contact [facility] directly at [phone].  Normal or non-critical lab and imaging results will be communicated to you by MyChart, letter or phone within 4 business days after the clinic has received the results. If you do not hear from us within 7 days, please contact the clinic through MyChart or phone. If you have a critical or abnormal lab result, we will notify you by phone as soon as possible.  Submit refill requests through [employer] or call your pharmacy and they will forward the refill request to us. Please allow 3 business days for your refill to be completed.          Additional Information About Your Visit        Calleo[employer]ar[employer]ide[employer] Information     [employer] gives you secure access to your electronic health record. If you see a primary care provider, you can also send messages to your care team and make appointments. If you have questions, please call your primary care clinic.  If you do not have a primary care provider, please call [phone] and they will assist you.        Care EveryWhere ID     This is your Care EveryWhere ID. This could be used by other organizations to access your [place] medical records  [id number]        Your Vitals Were     Last Period BMI (Body Mass Index)                02/22/2018 36.02 kg/m2           Blood Pressure from Last 3 Encounters:   10/31/18 112/82   10/25/18 120/68   10/10/18 120/60    Weight from Last 3 Encounters:   10/31/18 230 lb (104.3 kg)   10/25/18 229 lb (103.9 kg)   10/10/18 228 lb (103.4 kg)              Today, you had the following     No orders found for display       Primary Care Provider Office Phone # Fax #    Susan Rachele Haase, APRN [phone] [address] [facility] [address]        Equal Access to Services     CLARA JACOBSON : Armando Zuluaga, donna gupta, paula kaconor yang, yaw kennedy. So [facility] [phone].    ATENCIÓN: Maxwell sy  español, tiene a gary disposición servicios gratuitos de asistencia lingüística. Janel deleon [phone].    We comply with applicable federal civil rights laws and [place] laws. We do not discriminate on the basis of race, color, national origin, age, disability, sex, sexual orientation, or gender identity.            Thank you!     Thank you for choosing [facility]  for your care. Our goal is always to provide you with excellent care. Hearing back from our patients is one way we can continue to improve our services. Please take a few minutes to complete the written survey that you may receive in the mail after your visit with us. Thank you!             Your Updated Medication List - Protect others around you: Learn how to safely use, store and throw away your medicines at www.disposemymeds.org.          This list is accurate as of 10/31/18  2:25 PM.  Always use your most recent med list.                   Brand Name Dispense Instructions for use Diagnosis    CHEWABLE MULTIPLE VITAMINS OR       Supervision of normal first pregnancy, antepartum       ferrous sulfate 325 (65 Fe) MG tablet    IRON    30 tablet    Take 1 tablet (325 mg) by mouth daily (with breakfast)    Other iron deficiency anemia       ranitidine 300 MG tablet    ZANTAC    90 tablet    Take 1 tablet (300 mg) by mouth At Bedtime    Gastroesophageal reflux disease without esophagitis

## 2018-10-31 NOTE — TELEPHONE ENCOUNTER
Pt is 35w6d stating she has been having sharp abdominal pains since yesterday. The pain is constant. She says it's in her lower abdomen, more in the middle than on one side. No bleeding. No abnormal discharge or urinary problems. She says the vomited yesterday and had diarrhea today. Having fetal movement, but less than usual. No vision changes or headache. The pain came on suddenly, she did not do any abnormal movements, etc. No fever. Pt does not notice the pain when she is lying down, and it is more painful standing or walking. Please advise. Do you want her to be seen today?         Debra Greenfield RN

## 2018-10-31 NOTE — TELEPHONE ENCOUNTER
She should be evaluated today in clinic, the on-call provider can likely see her this afternoon.     Karen Ledezma MD

## 2018-10-31 NOTE — PROGRESS NOTES
"Notes \"pinching\" mid abdominal pain. Worsens with movement, standing. Fetus active, no vaginal bleeding.  Abdomen: BS active. Soft, non-tender, no masses or organomegaly. Gravid.  Musculoskeletal pain.  "

## 2018-11-06 ENCOUNTER — OFFICE VISIT (OUTPATIENT)
Dept: MATERNAL FETAL MEDICINE | Facility: CLINIC | Age: 23
End: 2018-11-06
Attending: OBSTETRICS & GYNECOLOGY
Payer: COMMERCIAL

## 2018-11-06 ENCOUNTER — HOSPITAL ENCOUNTER (OUTPATIENT)
Dept: ULTRASOUND IMAGING | Facility: CLINIC | Age: 23
Discharge: HOME | End: 2018-11-06
Attending: OBSTETRICS & GYNECOLOGY | Admitting: OBSTETRICS & GYNECOLOGY
Payer: COMMERCIAL

## 2018-11-06 DIAGNOSIS — O36.5910 MATERNAL CARE FOR OTHER KNOWN OR SUSPECTED POOR FETAL GROWTH, FIRST TRIMESTER, NOT APPLICABLE OR UNSPECIFIED: Primary | ICD-10-CM

## 2018-11-06 DIAGNOSIS — O36.5910 MATERNAL CARE FOR OTHER KNOWN OR SUSPECTED POOR FETAL GROWTH, FIRST TRIMESTER, NOT APPLICABLE OR UNSPECIFIED: ICD-10-CM

## 2018-11-06 PROCEDURE — 76816 OB US FOLLOW-UP PER FETUS: CPT

## 2018-11-06 NOTE — MR AVS SNAPSHOT
After Visit Summary   11/6/2018    Ana Mauricio    MRN: [id number]           Patient Information     Date Of Birth          1995        Visit Information        Provider Department      11/6/2018 2:00 PM Rian Yates MD [facility] Maternal Fetal Medicine [facility]        Today's Diagnoses     Maternal care for other known or suspected poor fetal growth, first trimester, not applicable or unspecified    -  1       Follow-ups after your visit        Your next 10 appointments already scheduled     Nov 08, 2018 11:45 AM CST   [place] OB-GYN Established Prenatal with Karen Ledezma MD   [facility] ([facility])    [address]  [address] [place] [address]   [phone]            Nov 15, 2018  3:15 PM CST   ESTABLISHED PRENATAL with Ashley Hernandez DO   [facility] ([facility])    [address]  [address] [place] [address]   [phone]            Nov 21, 2018  2:30 PM CST   [place] OB-GYN Established Prenatal with Ashley Hernandez DO   [facility] ([facility])    [address]  [address] [place] [address]   [phone]              Who to contact     If you have questions or need follow up information about today's clinic visit or your schedule please contact [facility] MATERNAL FETAL MEDICINE [facility] directly at [phone].  Normal or non-critical lab and imaging results will be communicated to you by MyChart, letter or phone within 4 business days after the clinic has received the results. If you do not hear from us within 7 days, please contact the clinic through MyChart or phone. If you have a critical or abnormal lab result, we will notify you by phone as soon as possible.  Submit refill requests through [facility] or call your pharmacy and they will forward the refill request to us. Please allow 3 business days for your refill to be  completed.          Additional Information About Your Visit        [employer] Information     [employer] gives you secure access to your electronic health record. If you see a primary care provider, you can also send messages to your care team and make appointments. If you have questions, please call your primary care clinic.  If you do not have a primary care provider, please call [phone] and they will assist you.        Care EveryWhere ID     This is your Care EveryWhere ID. This could be used by other organizations to access your [place] medical records  [id number]        Your Vitals Were     Last Period                   02/22/2018            Blood Pressure from Last 3 Encounters:   10/31/18 112/82   10/25/18 120/68   10/10/18 120/60    Weight from Last 3 Encounters:   10/31/18 104.3 kg (230 lb)   10/25/18 103.9 kg (229 lb)   10/10/18 103.4 kg (228 lb)              Today, you had the following     No orders found for display       Primary Care Provider Office Phone # Fax #    Maggie Rachele Haase, APRN [phone]       [address] [facility] [address]        Equal Access to Services     [facility]: Hadii aad ku hadasho Soomaali, waaxda luqadaha, qaybta kaalmada adeegyada, yaw varghese haybellen adejoselyn rincon . So [facility] [phone].    ATENCIÓN: Si habla español, tiene a gary disposición servicios gratPresbyterian Hospitalos de asistencia lingüística. Llame al [phone].    We comply with applicable federal civil rights laws and [place] laws. We do not discriminate on the basis of race, color, national origin, age, disability, sex, sexual orientation, or gender identity.            Thank you!     Thank you for choosing [facility]ERNAL FETAL [facility]IC[facility]  for your care. Our goal is always to provide you with excellent care. Hearing back from our patients is one way we can continue to improve our services. Please take a few minutes to complete the written survey that you may receive in the  mail after your visit with us. Thank you!             Your Updated Medication List - Protect others around you: Learn how to safely use, store and throw away your medicines at www.disposemymeds.org.          This list is accurate as of 11/6/18  2:26 PM.  Always use your most recent med list.                   Brand Name Dispense Instructions for use Diagnosis    CHEWABLE MULTIPLE VITAMINS OR       Supervision of normal first pregnancy, antepartum       ferrous sulfate 325 (65 Fe) MG tablet    IRON    30 tablet    Take 1 tablet (325 mg) by mouth daily (with breakfast)    Other iron deficiency anemia       ranitidine 300 MG tablet    ZANTAC    90 tablet    Take 1 tablet (300 mg) by mouth At Bedtime    Gastroesophageal reflux disease without esophagitis

## 2018-11-06 NOTE — PROGRESS NOTES
"Please see \"Imaging\" tab under \"Chart Review\" for details of today's ultrasound.    Rian Yates M.D.  Specialist in Maternal-Fetal Medicine     "

## 2018-11-08 ENCOUNTER — PRENATAL OFFICE VISIT (OUTPATIENT)
Dept: OBGYN | Facility: CLINIC | Age: 23
End: 2018-11-08
Payer: COMMERCIAL

## 2018-11-08 VITALS — BODY MASS INDEX: 37.07 KG/M2 | SYSTOLIC BLOOD PRESSURE: 114 MMHG | DIASTOLIC BLOOD PRESSURE: 62 MMHG | WEIGHT: 236.7 LBS

## 2018-11-08 DIAGNOSIS — Z34.00 SUPERVISION OF NORMAL FIRST PREGNANCY, ANTEPARTUM: Primary | ICD-10-CM

## 2018-11-08 PROCEDURE — 99207 ZZC PRENATAL VISIT: CPT | Performed by: OBSTETRICS & GYNECOLOGY

## 2018-11-08 NOTE — NURSING NOTE
"Chief Complaint   Patient presents with     Prenatal Care     37 w 0 d, weight gain concerns       Initial /62 (BP Location: Right arm, Patient Position: Sitting, Cuff Size: Adult Large)  Wt 236 lb 11.2 oz (107.4 kg)  LMP 2018  BMI 37.07 kg/m2 Estimated body mass index is 37.07 kg/(m^2) as calculated from the following:    Height as of 18: 5' 7\" (1.702 m).    Weight as of this encounter: 236 lb 11.2 oz (107.4 kg).  BP completed using cuff size: large    Questioned patient about current smoking habits.  Pt. has never smoked.          The following HM Due: NONE      Lynda Henry CMA               "

## 2018-11-08 NOTE — PROGRESS NOTES
"CC: Here for routine prenatal visit   23 year old y/o  @ 37w0d with Estimated Date of Delivery: 2018   HPI: No problems  /62 (BP Location: Right arm, Patient Position: Sitting, Cuff Size: Adult Large)  Wt 236 lb 11.2 oz (107.4 kg)  LMP 2018  BMI 37.07 kg/m2  See OB flowsheet  + fetal movement, no contractions, no bleeding, no loss of fluid   Discussed PTL signs.    1) concerns: none  -  2018: \"Small AC\" > Resolved at [facility] 10/02/2018, repeat US with [facility] wn  2) Routine care: Declines genetic screening; GC - negative; GTT - normal; Tdap given  3) Return: weekly    Karen Ledezma MD         "

## 2018-11-08 NOTE — MR AVS SNAPSHOT
After Visit Summary   11/8/2018    Ana Mauricio    MRN: [id number]           Patient Information     Date Of Birth          1995        Visit Information        Provider Department      11/8/2018 11:45 AM Karen Ledezma MD [facility]        Today's Diagnoses     Supervision of normal first pregnancy, antepartum    -  1       Follow-ups after your visit        Your next 10 appointments already scheduled     Nov 15, 2018  3:15 PM CST   ESTABLISHED PRENATAL with Ashley Hernandez,    [facility] ([facility])    [address]  [address] [place] [id number] [phone]            Nov 21, 2018  2:30 PM CST   MyChart OB-GYN Established Prenatal with Ashley Hernandez DO   [facility] ([facility])    [address]  [address] [place] [id number] [phone]              Who to contact     If you have questions or need follow up information about today's clinic visit or your schedule please contact [facility] directly at [phone].  Normal or non-critical lab and imaging results will be communicated to you by [employer], letter or phone within 4 business days after the clinic has received the results. If you do not hear from us within 7 days, please contact the clinic through [employer] or phone. If you have a critical or abnormal lab result, we will notify you by phone as soon as possible.  Submit refill requests through [employer] or call your pharmacy and they will forward the refill request to us. Please allow 3 business days for your refill to be completed.          Additional Information About Your Visit        [employer] Information     [employer] gives you secure access to your electronic health record. If you see a primary care provider, you can also send messages to your care team and make appointments. If you have questions, please call your primary care clinic.   If you do not have a primary care provider, please call [phone] and they will assist you.        Care EveryWhere ID     This is your Care EveryWhere ID. This could be used by other organizations to access your [place] medical records  [id number]        Your Vitals Were     Last Period BMI (Body Mass Index)                02/22/2018 37.07 kg/m2           Blood Pressure from Last 3 Encounters:   11/08/18 114/62   10/31/18 112/82   10/25/18 120/68    Weight from Last 3 Encounters:   11/08/18 236 lb 11.2 oz (107.4 kg)   10/31/18 230 lb (104.3 kg)   10/25/18 229 lb (103.9 kg)              Today, you had the following     No orders found for display       Primary Care Provider Office Phone # Fax #    Susan Rachele Haase, APRN [phone]       [address] [facility] [address]        Equal Access to Services     [facility] : Hadii aad ku hadasho Soomaali, waaxda luqadaha, qaybta kaalmada adeegyada, waxay idiin hayaan elle rincon . So [facility] [phone].    ATENCIÓN: Si habla español, tiene a gary disposición servicios gratuitos de asistencia lingüística. Llame al [phone].    We comply with applicable federal civil rights laws and [place] laws. We do not discriminate on the basis of race, color, national origin, age, disability, sex, sexual orientation, or gender identity.            Thank you!     Thank you for choosing [facility]  for your care. Our goal is always to provide you with excellent care. Hearing back from our patients is one way we can continue to improve our services. Please take a few minutes to complete the written survey that you may receive in the mail after your visit with us. Thank you!             Your Updated Medication List - Protect others around you: Learn how to safely use, store and throw away your medicines at www.disposemymeds.org.          This list is accurate as of 11/8/18  1:11 PM.  Always use your most recent med list.                    Brand Name Dispense Instructions for use Diagnosis    CHEWABLE MULTIPLE VITAMINS OR       Supervision of normal first pregnancy, antepartum       ferrous sulfate 325 (65 Fe) MG tablet    IRON    30 tablet    Take 1 tablet (325 mg) by mouth daily (with breakfast)    Other iron deficiency anemia       ranitidine 300 MG tablet    ZANTAC    90 tablet    Take 1 tablet (300 mg) by mouth At Bedtime    Gastroesophageal reflux disease without esophagitis

## 2018-11-15 ENCOUNTER — PRENATAL OFFICE VISIT (OUTPATIENT)
Dept: OBGYN | Facility: CLINIC | Age: 23
End: 2018-11-15
Payer: COMMERCIAL

## 2018-11-15 VITALS
HEIGHT: 67 IN | SYSTOLIC BLOOD PRESSURE: 110 MMHG | DIASTOLIC BLOOD PRESSURE: 70 MMHG | BODY MASS INDEX: 37.59 KG/M2 | WEIGHT: 239.5 LBS

## 2018-11-15 DIAGNOSIS — Z34.00 SUPERVISION OF NORMAL FIRST PREGNANCY, ANTEPARTUM: ICD-10-CM

## 2018-11-15 PROCEDURE — 99207 ZZC PRENATAL VISIT: CPT | Performed by: FAMILY MEDICINE

## 2018-11-15 NOTE — MR AVS SNAPSHOT
After Visit Summary   11/15/2018    Ana Mauricio    MRN: [id number]           Patient Information     Date Of Birth          1995        Visit Information        Provider Department      11/15/2018 3:15 PM Ashley Hernandez DO [facility]        Today's Diagnoses     Supervision of normal first pregnancy, antepartum          Care Instructions    Return weekly  Return to clinic:  every 4 weeks till 30 weeks, then every 2 weeks till 36 weeks, then weekly till delivery      Phone numbers [place]:  Day/ night [phone] ask for ob triage  Emergency:  Call labor and delivery:  [phone]    What should I call about??    Contraction every 5 minutes for 1 hour 1 minute long (511), bleeding, loss of fluid, headache that doesn't resolve with tylenol, and decreased fetal movement     Start kick counts @ 26-28 weeks   Keep track of movement and discover your normal baby movement pattern   guideline is listed below  Please call if you do not feel the baby move!  We will have you come in for fetal heart rate monitoring:   Perception of at least 10 FMs during 12 hours of normal maternal activity   Perception of least 10 FMs over two hours when the mother is at rest and focused on counting       [facility] Address   [address], [place] [address] [phone]    Dr. Ashley Hernandez, DO    OB/GYN   [facility]                                      Follow-ups after your visit        Your next 10 appointments already scheduled     Nov 21, 2018  2:30 PM CST   [place] OB-GYN Established Prenatal with Ashley Hernandez DO   [facility] ([facility])    [facility] [address]  [address]  [facility] [address] [phone]              Who to contact     If you have questions or need follow up information about today's clinic visit or your schedule please contact [facility]  "directly at [phone].  Normal or non-critical lab and imaging results will be communicated to you by MyChart, letter or phone within 4 business days after the clinic has received the results. If you do not hear from us within 7 days, please contact the clinic through Z-goodhart or phone. If you have a critical or abnormal lab result, we will notify you by phone as soon as possible.  Submit refill requests through [employer] or call your pharmacy and they will forward the refill request to us. Please allow 3 business days for your refill to be completed.          Additional Information About Your Visit        Z-goodhart Information     [employer] gives you secure access to your electronic health record. If you see a primary care provider, you can also send messages to your care team and make appointments. If you have questions, please call your primary care clinic.  If you do not have a primary care provider, please call [phone] and they will assist you.        Care EveryWhere ID     This is your Care EveryWhere ID. This could be used by other organizations to access your [place] medical records  [id number]        Your Vitals Were     Height Last Period BMI (Body Mass Index)             5' 7\" (1.702 m) 02/22/2018 37.51 kg/m2          Blood Pressure from Last 3 Encounters:   11/15/18 110/70   11/08/18 114/62   10/31/18 112/82    Weight from Last 3 Encounters:   11/15/18 239 lb 8 oz (108.6 kg)   11/08/18 236 lb 11.2 oz (107.4 kg)   10/31/18 230 lb (104.3 kg)              Today, you had the following     No orders found for display       Primary Care Provider Office Phone # Fax #    Susan Rachele Haase, APRN [phone] [address] [facility] [address]        Equal Access to Services     CLARA JACOBSON : Hadii jillian Zuluaga, warebeccada alonso, qaybta kaalmada celia, yaw kennedy. So [facility] [phone].    ATENCIÓN: Si ronnell vergara gary " disposición servicios gratuitos de asistencia lingüística. Janel deleon [phone].    We comply with applicable federal civil rights laws and [place] laws. We do not discriminate on the basis of race, color, national origin, age, disability, sex, sexual orientation, or gender identity.            Thank you!     Thank you for choosing [facility]  for your care. Our goal is always to provide you with excellent care. Hearing back from our patients is one way we can continue to improve our services. Please take a few minutes to complete the written survey that you may receive in the mail after your visit with us. Thank you!             Your Updated Medication List - Protect others around you: Learn how to safely use, store and throw away your medicines at www.disposemymeds.org.          This list is accurate as of 11/15/18  3:30 PM.  Always use your most recent med list.                   Brand Name Dispense Instructions for use Diagnosis    CHEWABLE MULTIPLE VITAMINS OR       Supervision of normal first pregnancy, antepartum       ferrous sulfate 325 (65 Fe) MG tablet    IRON    30 tablet    Take 1 tablet (325 mg) by mouth daily (with breakfast)    Other iron deficiency anemia       ranitidine 300 MG tablet    ZANTAC    90 tablet    Take 1 tablet (300 mg) by mouth At Bedtime    Gastroesophageal reflux disease without esophagitis

## 2018-11-15 NOTE — PATIENT INSTRUCTIONS
Return weekly  Return to clinic:  every 4 weeks till 30 weeks, then every 2 weeks till 36 weeks, then weekly till delivery      Phone numbers [place]:  Day/ night [phone] ask for ob triage  Emergency:  Call labor and delivery:  [phone]    What should I call about??    Contraction every 5 minutes for 1 hour 1 minute long (511), bleeding, loss of fluid, headache that doesn't resolve with tylenol, and decreased fetal movement     Start kick counts @ 26-28 weeks   Keep track of movement and discover your normal baby movement pattern   guideline is listed below  Please call if you do not feel the baby move!  We will have you come in for fetal heart rate monitoring:   Perception of at least 10 FMs during 12 hours of normal maternal activity   Perception of least 10 FMs over two hours when the mother is at rest and focused on [facility] Address   [address], [place], [place] [address] [phone]    Dr. Ashley Hernandez, DO    OB/GYN   [facility]

## 2018-11-15 NOTE — PROGRESS NOTES
"CC: Here for routine prenatal visit   23 year old y/o  @ 38w0d with Estimated Date of Delivery: 2018   HPI: Pt is doing well, expresses no concerns at today's visit.        This document serves as a record of the services and decisions personally performed and made by Ashley Hernandez DO. It was created on her behalf by Yasmine An, a trained medical scribe. The creation of this document is based the provider's statements to the medical scribe.  Scribe Yasmine An 3:24 PM, November 15, 2018    /70  Ht 1.702 m (5' 7\")  Wt 108.6 kg (239 lb 8 oz)  LMP 2018  BMI 37.51 kg/m2  See OB flowsheet  + fetal movement, no contractions, no bleeding, no loss of fluid   Discussed monitoring fetal movement - aware of kick counts, reports good movement today      1) concerns: No concerns today  - US 2018: \"Small AC\", repeat US with MFM in 3-4 weeks   > Resolved at 2018 US with MFM  2) Routine care: Declines genetic screening; GC - negative; GTT - normal; Tdap given; GBS - negative  3) Return: Weekly        The information in this document, created by the medical scribe for me, accurately reflects the services I personally performed and the decisions made by me. I have reviewed and approved this document for accuracy prior to leaving the patient care area.  3:24 PM, 11/15/18    David    "

## 2018-11-15 NOTE — NURSING NOTE
"38w0d    Chief Complaint   Patient presents with     Prenatal Care       Initial /70  Ht 5' 7\" (1.702 m)  Wt 239 lb 8 oz (108.6 kg)  LMP 2018  BMI 37.51 kg/m2 Estimated body mass index is 37.51 kg/(m^2) as calculated from the following:    Height as of this encounter: 5' 7\" (1.702 m).    Weight as of this encounter: 239 lb 8 oz (108.6 kg).  BP completed using cuff size: large    Questioned patient about current smoking habits.  Pt. has never smoked.          The following HM Due: NONE        "

## 2018-11-20 ENCOUNTER — TELEPHONE (OUTPATIENT)
Dept: OBGYN | Facility: CLINIC | Age: 23
End: 2018-11-20

## 2018-11-20 ENCOUNTER — HOSPITAL ENCOUNTER (OUTPATIENT)
Facility: CLINIC | Age: 23
Discharge: HOME | End: 2018-11-20
Attending: FAMILY MEDICINE | Admitting: FAMILY MEDICINE
Payer: COMMERCIAL

## 2018-11-20 ENCOUNTER — APPOINTMENT (OUTPATIENT)
Dept: ULTRASOUND IMAGING | Facility: CLINIC | Age: 23
End: 2018-11-20
Attending: FAMILY MEDICINE
Payer: COMMERCIAL

## 2018-11-20 VITALS — SYSTOLIC BLOOD PRESSURE: 116 MMHG | TEMPERATURE: 98.5 F | RESPIRATION RATE: 18 BRPM | DIASTOLIC BLOOD PRESSURE: 70 MMHG

## 2018-11-20 LAB
ALBUMIN UR-MCNC: 30 MG/DL
APPEARANCE UR: ABNORMAL
BACTERIA #/AREA URNS HPF: ABNORMAL /HPF
BILIRUB UR QL STRIP: NEGATIVE
COLOR UR AUTO: YELLOW
GLUCOSE UR STRIP-MCNC: NEGATIVE MG/DL
HGB UR QL STRIP: NEGATIVE
KETONES UR STRIP-MCNC: NEGATIVE MG/DL
LEUKOCYTE ESTERASE UR QL STRIP: NEGATIVE
MUCOUS THREADS #/AREA URNS LPF: PRESENT /LPF
NITRATE UR QL: NEGATIVE
PH UR STRIP: 6 PH (ref 5–7)
RBC #/AREA URNS AUTO: <1 /HPF (ref 0–2)
SOURCE: ABNORMAL
SP GR UR STRIP: 1.02 (ref 1–1.03)
SQUAMOUS #/AREA URNS AUTO: 13 /HPF (ref 0–1)
UROBILINOGEN UR STRIP-MCNC: 0 MG/DL (ref 0–2)
WBC #/AREA URNS AUTO: 2 /HPF (ref 0–5)

## 2018-11-20 PROCEDURE — 99213 OFFICE O/P EST LOW 20 MIN: CPT | Performed by: FAMILY MEDICINE

## 2018-11-20 PROCEDURE — 81001 URINALYSIS AUTO W/SCOPE: CPT | Performed by: FAMILY MEDICINE

## 2018-11-20 PROCEDURE — G0463 HOSPITAL OUTPT CLINIC VISIT: HCPCS | Mod: 25

## 2018-11-20 PROCEDURE — 76819 FETAL BIOPHYS PROFIL W/O NST: CPT

## 2018-11-20 PROCEDURE — 59025 FETAL NON-STRESS TEST: CPT | Mod: XU

## 2018-11-20 NOTE — PROVIDER NOTIFICATION
18 1315   Provider Notification   Provider Name/Title Dr. Hernandez   Method of Notification In Department   Request Evaluate - Remote   Notification Reason Patient Arrived   Data: Patient presented to [facility]: 2018  1:08 PM.  Reason for maternal/fetal assessment is abdominal pain and low back pain. Patient reports cramping discomfort in low back/low abdomen that has worsened throughout the day.  Patient is a .  Prenatal record reviewed. Pregnancy has been uncomplicated..  Gestational Age 38w5d. VSS. Fetal movement present. Patient denies uterine contractions, leaking of vaginal fluid/rupture of membranes, pelvic pressure, nausea, vomiting, headache, visual disturbances, epigastric or URQ pain, significant edema. Support person is not present.   Action: Verbal consent for EFM. Triage assessment completed. Bill of rights reviewed.  Response: Patient verbalized agreement with plan. Will contact Dr Ashley Hernandez with update and further orders.    Dr. Hernandez aware of pt's arrival.

## 2018-11-20 NOTE — IP AVS SNAPSHOT
MRN:[id number]                      After Visit Summary   11/20/2018    Ana Mauricio    MRN: [id number]           Thank you!     Thank you for choosing [facility] for your care. Our goal is always to provide you with excellent care. Hearing back from our patients is one way we can continue to improve our services. Please take a few minutes to complete the written survey that you may receive in the mail after you visit. If you would like to speak to someone directly about your visit please contact Patient Relations at [phone]. Thank you!          Patient Information     Date Of Birth          1995        About your hospital stay     You were admitted on:  November 20, 2018 You last received care in the:  [facility] Labor and Delivery    You were discharged on:  November 20, 2018       Who to Call     For medical emergencies, please call 911.  For non-urgent questions about your medical care, please call your primary care provider or clinic, [phone]          Attending Provider     Provider Specialty    Ashley Hernandez DO OB/Gyn       Primary Care Provider Office Phone # Fax #    Susan Rachele Haase, APRN [phone]      Your next 10 appointments already scheduled     Nov 21, 2018  2:30 PM CST   [facility] DO   [facility]    [address]  [address]  [facility] [id number] [phone]              Further instructions from your care team       Discharge Instruction for Undelivered Patients      You were seen for: Fetal Assessment  We Consulted: Dr Hernandez  You had (Test or Medicine): biophysical profile, NST, cervix check, urine analysis     Diet:   Drink 8 to 12 glasses of liquids (milk, juice, water) every day.     Activity:  Count fetal kicks everyday (see handout)  Call your doctor or nurse midwife if your baby is moving less than usual.      Call your provider if you notice:  Swelling in your face or increased swelling in your hands or legs.  Headaches that are not relieved by Tylenol (acetaminophen).  Changes in your vision (blurring: seeing spots or stars.)  Nausea (sick to your stomach) and vomiting (throwing up).   Weight gain of 5 pounds or more per week.  Heartburn that doesn't go away.  Signs of bladder infection: pain when you urinate (use the toilet), need to go more often and more urgently.  The bag of carias (rupture of membranes) breaks, or you notice leaking in your underwear.  Bright red blood in your underwear.  Abdominal (lower belly) or stomach pain.  Second (plus) baby: Contractions (tightening) less than 10 minutes apart and getting stronger.  *If less than 34 weeks: Contractions (tightenings) more than 6 times in one hour.  Increase or change in vaginal discharge (note the color and amount)    Follow-up:  As scheduled in the clinic          Pending Results     No orders found from 11/18/2018 to 11/21/2018.            Admission Information     Date & Time Provider Department Dept. Phone    11/20/2018 Ashley Hernandez, [facility] Labor and Delivery [phone]      Your Vitals Were     Blood Pressure Temperature Respirations Last Period          116/70 98.5  F (36.9  C) (Oral) 18 02/22/2018        [facility] Information     etouches gives you secure access to your electronic health record. If you see a primary care provider, you can also send messages to your care team and make appointments. If you have questions, please call your primary care clinic.  If you do not have a primary care provider, please call [phone] and they will assist you.        Care EveryWhere ID     This is your Care EveryWhere ID. This could be used by other organizations to access your [facility] medical records  [id number]        Equal Access to Services     CLARA JACOBSON : donna Bower qaybta kaalmada  yaw yangjoselyn estephaniefarhana galloway'aan ah. So [facility] [phone].    ATENCIÓN: Si habla jack, tiene a gary disposición servicios gratuitos de asistencia lingüística. Janel al [phone].    We comply with applicable federal civil rights laws and [place] laws. We do not discriminate on the basis of race, color, national origin, age, disability, sex, sexual orientation, or gender identity.               Review of your medicines      UNREVIEWED medicines. Ask your doctor about these medicines        Dose / Directions    CHEWABLE MULTIPLE VITAMINS OR   Used for:  Supervision of normal first pregnancy, antepartum        Refills:  0       ferrous sulfate 325 (65 Fe) MG tablet   Commonly known as:  IRON   Used for:  Other iron deficiency anemia        Dose:  325 mg   Take 1 tablet (325 mg) by mouth daily (with breakfast)   Quantity:  30 tablet   Refills:  2       ranitidine 300 MG tablet   Commonly known as:  ZANTAC   Used for:  Gastroesophageal reflux disease without esophagitis        Dose:  300 mg   Take 1 tablet (300 mg) by mouth At Bedtime   Quantity:  90 tablet   Refills:  1                Protect others around you: Learn how to safely use, store and throw away your medicines at www.disposemymeds.org.             Medication List: This is a list of all your medications and when to take them. Check marks below indicate your daily home schedule. Keep this list as a reference.      Medications           Morning Afternoon Evening Bedtime As Needed    CHEWABLE MULTIPLE VITAMINS OR                                ferrous sulfate 325 (65 Fe) MG tablet   Commonly known as:  IRON   Take 1 tablet (325 mg) by mouth daily (with breakfast)                                ranitidine 300 MG tablet   Commonly known as:  ZANTAC   Take 1 tablet (300 mg) by mouth At Bedtime

## 2018-11-20 NOTE — DISCHARGE INSTRUCTIONS
Discharge Instruction for Undelivered Patients      You were seen for: Fetal Assessment  We Consulted: Dr Hernandez  You had (Test or Medicine): biophysical profile, NST, cervix check, urine analysis     Diet:   Drink 8 to 12 glasses of liquids (milk, juice, water) every day.     Activity:  Count fetal kicks everyday (see handout)  Call your doctor or nurse midwife if your baby is moving less than usual.     Call your provider if you notice:  Swelling in your face or increased swelling in your hands or legs.  Headaches that are not relieved by Tylenol (acetaminophen).  Changes in your vision (blurring: seeing spots or stars.)  Nausea (sick to your stomach) and vomiting (throwing up).   Weight gain of 5 pounds or more per week.  Heartburn that doesn't go away.  Signs of bladder infection: pain when you urinate (use the toilet), need to go more often and more urgently.  The bag of carias (rupture of membranes) breaks, or you notice leaking in your underwear.  Bright red blood in your underwear.  Abdominal (lower belly) or stomach pain.  Second (plus) baby: Contractions (tightening) less than 10 minutes apart and getting stronger.  *If less than 34 weeks: Contractions (tightenings) more than 6 times in one hour.  Increase or change in vaginal discharge (note the color and amount)    Follow-up:  As scheduled in the clinic

## 2018-11-20 NOTE — IP AVS SNAPSHOT
[facility] Labor [facility] Delivery    [address]    [facility] [address]    Phone:  [phone]    Fax:  [phone]                                       After Visit Summary   11/20/2018    Ana Mauricio    MRN: [id number]           After Visit Summary Signature Page     I have received my discharge instructions, and my questions have been answered. I have discussed any challenges I see with this plan with the nurse or doctor.    ..........................................................................................................................................  Patient/Patient Representative Signature      ..........................................................................................................................................  Patient Representative Print Name and Relationship to Patient    ..................................................               ................................................  Date                                   Time    ..........................................................................................................................................  Reviewed by Signature/Title    ...................................................              ..............................................  Date                                               Time          22EPIC Rev 08/18

## 2018-11-20 NOTE — PROVIDER NOTIFICATION
11/20/18 1706   Provider Notification   Provider Name/Title Dr Hernandez   Method of Notification In Department   MD in department reviewing chart, order to discharge home with scheduled follow-up tomorrow at 1430 with Dr Hernandez in the clinic, will consult MFM regarding СВЕТЛАНА of 24.

## 2018-11-20 NOTE — TELEPHONE ENCOUNTER
38w5d,   Has appt tomorrow    Pt calls today with constant lower back pain.  Feels like sharp pain in lower sides.  Radiating around to front a bit.  Rated at 4/10, has gotten worse since this morning when it first started.      No watery leakage.  Some slight spotting.    Slightly less fetal movements than normal, but has felt her move today.    Pt has appt tomorrow, should she come in today?  Or rest and monitor, call if inc?    Nataly STEHPENS R.N.  [facility]

## 2018-11-20 NOTE — PROVIDER NOTIFICATION
11/20/18 1611   Provider Notification   Provider Name/Title Dr Hernandez   Method of Notification Phone   Notification Reason Status Update   MD notified that tracing is now category 1, accelerations present  MD still to come see patient in 15-20minutes and likely discharge with induction plan.

## 2018-11-20 NOTE — PROVIDER NOTIFICATION
11/20/18 9338   Provider Notification   Provider Name/Title Dr. Hernandez   Method of Notification In Department   Updated Dr. Hernandez on lower uterine cramping and low back pain. Uterine irritability with occasional contractions on monitor, which palpate very mild. Also updated on non-reactive NST, baseline rate 145-150, minimal variability, no decelerations seen. SVE by RN 0.5/50/-3, posterior. UA sent for analysis.    MD orders for oral hydration and a BPP now. Orders received and initiated. MD states to update her on BPP and UA results. MD states pt will need a reactive NST in order to (potentially) discharge. POC reviewed with pt and her mother--all questions answered.

## 2018-11-20 NOTE — TELEPHONE ENCOUNTER
Spoke to on call,  Pt to go to L&D.  Pt aware.  L&D aware.    Nataly STEPHENS R.N.  [facility]

## 2018-11-20 NOTE — TELEPHONE ENCOUNTER
D/w Rn patient  To come to labor and delivery for eval   Dr. Ashley Hernandez,     Obstetrics and Gynecology  [facility] [place]

## 2018-11-20 NOTE — PROVIDER NOTIFICATION
11/20/18 1524   Provider Notification   Provider Name/Title Dr Hernandez   Method of Notification Phone   Notification Reason Lab/Diagnostic Study;Other (Comment)   MD updated on patients preliminary BPP results 8/8 with an СВЕТЛАНА of 24 (poly)  Since placing patient back on monitor, minimal variability noted and no accelerations  Plan to monitor for 1 more hour and MD will come to talk with patient about possible induction/discharge.

## 2018-11-20 NOTE — TELEPHONE ENCOUNTER
Call MFM about СВЕТЛАНА and when to delivery   Wed!   Dr. Ashley Hernandez, DO    Obstetrics and Gynecology  [facility] and [place]

## 2018-11-20 NOTE — H&P
[facility] Labor and Delivery Triage Note    Ana Mauricio MRN# [id number]   Age: 23 year old YOB: 1995     Date of Admission:  2018    Primary care provider: Haase, Susan Rachele           Chief Complaint:   Ana Mauricio is a 23 year old female who is  @ 38w5d pregnant and being admitted for observation for back pain, NST was initially non-reactive with bpp 8/8, and slightly elevated fluid СВЕТЛАНА 24 which was borderline high vs polyhydramnios.  Then the NST became reactive after eating and further accels are noted.     Assessment:  Back pain now feeling better, contractions q 1-2 minutes, not felt per patient    UA is normal, no sign of UTI.     Plan:  Reactive NST and BPP 8/8 with СВЕТЛАНА 24, return to clinic tomorrow, I will also d/w MFM   On delivery date due to СВЕТЛАНА 24.    \            Pregnancy history:     OBSTETRIC HISTORY:    Obstetric History       T0      L0     SAB0   TAB0   Ectopic0   Multiple0   Live Births0       # Outcome Date GA Lbr Frans[place]/2nd Weight Sex Delivery Anes PTL Lv   1 Current                   EDC: Estimated Date of Delivery: 2018    Prenatal Labs:   Lab Results   Component Value Date    ABO O 2018    RH Pos 2018    AS Neg 2018    HEPBANG Nonreactive 2018    CHPCRT Negative 2018    GCPCRT Negative 2018    TREPAB Negative 2018    HGB 9.5 (L) 2018       GBS Status:   Lab Results   Component Value Date    GBS Negative 10/25/2018       Active Problem List  Patient Active Problem List   Diagnosis     Attention deficit hyperactivity disorder (ADHD)     Obesity     Iron deficiency anemia     Anxiety     Eczema     Hyperlipidemia LDL goal <160     GERD (gastroesophageal reflux disease)     Family history of diabetes mellitus     Vitamin D deficiency disease     Anxiety states     Major depressive disorder, single episode, moderate (H)     Cellulitis and abscess of trunk     Supervision of normal  first pregnancy, antepartum     Encounter for triage in pregnant patient     Prenatal care, first pregnancy       Medication Prior to Admission  Prescriptions Prior to Admission   Medication Sig Dispense Refill Last Dose     ferrous sulfate (IRON) 325 (65 Fe) MG tablet Take 1 tablet (325 mg) by mouth daily (with breakfast) 30 tablet 2 11/19/2018 at Unknown time     Pediatric Multiple Vitamins (CHEWABLE MULTIPLE VITAMINS OR)    11/19/2018 at Unknown time     ranitidine (ZANTAC) 300 MG tablet Take 1 tablet (300 mg) by mouth At Bedtime 90 tablet 1 11/19/2018 at Unknown time   .        Maternal Past Medical History:     Past Medical History:   Diagnosis Date     Anemia      Attention deficit disorder without mention of hyperactivity     not on meds now, tried several     Cellulitis and abscess of trunk 11/10/2017     Hyperlipidemia LDL goal <160 12/31/2011                       Family History:   This patient has no significant family history            Social History:   This patient has no significant social history         Review of Systems:   CONSTITUTIONAL: NEGATIVE for fever, chills, change in weight  INTEGUMENTARY/SKIN: NEGATIVE for worrisome rashes, moles or lesions  EYES: NEGATIVE for vision changes or irritation  ENT/MOUTH: NEGATIVE for ear, mouth and throat problems  RESP: NEGATIVE for significant cough or SOB  BREAST: NEGATIVE for masses, tenderness or discharge  CV: NEGATIVE for chest pain, palpitations or peripheral edema  GI: NEGATIVE for nausea, abdominal pain, heartburn, or change in bowel habits  : NEGATIVE for frequency, dysuria, or hematuria  MUSCULOSKELETAL: NEGATIVE for significant arthralgias or myalgia  NEURO: NEGATIVE for weakness, dizziness or paresthesias  ENDOCRINE: NEGATIVE for temperature intolerance, skin/hair changes  HEME: NEGATIVE for bleeding problems  PSYCHIATRIC: NEGATIVE for changes in mood or affect          Physical Exam:     Vitals were reviewed  All vitals stable  Patient Vitals  for the past 8 hrs:   BP Temp Temp src Resp   11/20/18 1328 116/70 - - -   11/20/18 1322 - 98.5  F (36.9  C) Oral 18     Constitutional: Awake, alert, cooperative, no apparent distress, and appears stated age.  Eyes: Lids and lashes normal, pupils equal, round and reactive to light, extra ocular muscles intact, sclera clear, conjunctiva normal.  ENT: Normocephalic, without obvious abnormality, atramatic, sinuses nontender on palpation, external ears without lesions, oral pharynx with moist mucus membranes, tonsils without erythema or exudates, gums normal and good dentition.  Neck: Supple, symmetrical, trachea midline, no adenopathy, thyroid symmetric, not enlarged and no tenderness, skin normal.  Hematologic / Lymphatic: No cervical lymphadenopathy and no supraclavicular lymphadenopathy.  Back: Symmetric, no curvature, spinous processes are non-tender on palpation, paraspinous muscles are non-tender on palpation, no costal vertebral tenderness.  Lungs: No increased work of breathing, good air exchange, clear to auscultation bilaterally, no crackles or wheezing.  Cardiovascular: Regular rate and rhythm, normal S1 and S2, no S3 or S4, and no murmur noted.  Abdomen: Gravid.  Musculoskeletal: No redness, warmth, or swelling of the joints.  Full range of motion noted.  Motor strength is 5 out of 5 all extremities bilaterally.  Tone is normal.  Neurologic: Awake, alert, oriented to name, place and time.  Cranial nerves II-XII are grossly intact.  Motor is 5 out of 5 bilaterally.  Cerebellar finger to nose, heel to shin intact.  Sensory is intact.  Babinski down going, Romberg negative, and gait is normal.  Neuropsychiatric: Normal affect, mood, orientation, memory and insight.  Skin: No rashes, erythema, pallor, petechia or purpura.   Cervix:   Membranes: intact   Dilation: 0.5   Effacement: 70%   Station:-2   Consistency: average   Position: Mid  Presentation:Cephalic  Fetal Heart Rate Tracing: Tier 1  (normal)  Tocometer: frequency q 1-2 minutes                       Assessment/ Plan   Ana Mauricio is a 23 year old female who is  @ 38w5d pregnant and being admitted for observation for back pain, NST was initially non-reactive with bpp 8/8, and slightly elevated fluid СВЕТЛАНА 24 which was borderline high vs polyhydramnios.  Then the NST became reactive after eating and further accels are noted.     Assessment:  Back pain now feeling better, contractions q 1-2 minutes, not felt per patient    UA is normal, no sign of UTI.     Plan:  Reactive NST and BPP 8/8 with СВЕТЛАНА 24, return to clinic tomorrow, I will also d/w MFM   On delivery date due to СВЕТЛАНА 24.      Ashley Hernandez, DO

## 2018-11-21 ENCOUNTER — PRENATAL OFFICE VISIT (OUTPATIENT)
Dept: OBGYN | Facility: CLINIC | Age: 23
End: 2018-11-21
Payer: COMMERCIAL

## 2018-11-21 VITALS
SYSTOLIC BLOOD PRESSURE: 110 MMHG | DIASTOLIC BLOOD PRESSURE: 68 MMHG | WEIGHT: 239.2 LBS | HEIGHT: 67 IN | BODY MASS INDEX: 37.54 KG/M2

## 2018-11-21 DIAGNOSIS — Z34.00 SUPERVISION OF NORMAL FIRST PREGNANCY, ANTEPARTUM: ICD-10-CM

## 2018-11-21 PROCEDURE — 99207 ZZC PRENATAL VISIT: CPT | Performed by: FAMILY MEDICINE

## 2018-11-21 NOTE — NURSING NOTE
"38w6d    Chief Complaint   Patient presents with     Prenatal Care     had cervical check yesterday--declines check today       Initial /68  Ht 5' 7\" (1.702 m)  Wt 239 lb 3.2 oz (108.5 kg)  LMP 2018  BMI 37.46 kg/m2 Estimated body mass index is 37.46 kg/(m^2) as calculated from the following:    Height as of this encounter: 5' 7\" (1.702 m).    Weight as of this encounter: 239 lb 3.2 oz (108.5 kg).  BP completed using cuff size: large    Questioned patient about current smoking habits.  Pt. has never smoked.          The following HM Due: NONE           "

## 2018-11-21 NOTE — PROGRESS NOTES
"CC: Here for routine prenatal visit   23 year old y/o  @ 38w6d with Estimated Date of Delivery: 2018   HPI: Pt is doing well, would like to ensure that her СВЕТЛАНА levels are safe for the baby -- measured at 24 cm yesterday in L&D.        This document serves as a record of the services and decisions personally performed and made by Ashley Hernandez DO. It was created on her behalf by Yasmine An, a trained medical scribe. The creation of this document is based the provider's statements to the medical scribe.  Scribe Yasmine An 2:56 PM, 2018    /68  Ht 1.702 m (5' 7\")  Wt 108.5 kg (239 lb 3.2 oz)  LMP 2018  BMI 37.46 kg/m2  See OB flowsheet  + fetal movement, no contractions, no bleeding, no loss of fluid   Discussed monitoring fetal movement - aware of kick counts, reports good movement today      1) concerns: СВЕТЛАНА borderline-- informed levels are normal up to 24 cm, will repeat BPP on  to continue monitoring, if levels elevate further then induction recommended @ 39-40w  - US 2018: \"Small AC\", repeat US with MFM in 3-4 weeks                        > Resolved at 2018 US with MFM  2) Routine care: Girl; Declines genetic screening; GC - negative; GTT - normal; Tdap given; GBS - negative  3) Return: Weekly        The information in this document, created by the medical scribe for me, accurately reflects the services I personally performed and the decisions made by me. I have reviewed and approved this document for accuracy prior to leaving the patient care area.  2:56 PM, 18    David    "

## 2018-11-21 NOTE — MR AVS SNAPSHOT
After Visit Summary   11/21/2018    Ana Mauricio    MRN: [id number]           Patient Information     Date Of Birth          1995        Visit Information        Provider Department      11/21/2018 2:30 PM Ashley Hernandez, DO [facility]        Today's Diagnoses     Supervision of normal first pregnancy, antepartum          Care Instructions    Return weekly   Set up BPP on Friday   Return to clinic:  every 4 weeks till 30 weeks, then every 2 weeks till 36 weeks, then weekly till delivery      Phone numbers [place]:  Day/ night [phone] ask for ob triage  Emergency:  Call labor and delivery:  [phone]    What should I call about??    Contraction every 5 minutes for 1 hour 1 minute long (511), bleeding, loss of fluid, headache that doesn't resolve with tylenol, and decreased fetal movement     Start kick counts @ 26-28 weeks   Keep track of movement and discover your normal baby movement pattern   guideline is listed below  Please call if you do not feel the baby move!  We will have you come in for fetal heart rate monitoring:   Perception of at least 10 FMs during 12 hours of normal maternal activity   Perception of least 10 FMs over two hours when the mother is at rest and focused on [facility] Address   [address], [place] [address] [phone]    Dr. Ashley Hernandez, DO    OB/GYN   [facility]                                      Follow-ups after your visit        Future tests that were ordered for you today     Open Future Orders        Priority Expected Expires Ordered    US Fetal Biophys Prof w/o Non Stress Test Routine  11/21/2019 11/21/2018            Who to contact     If you have questions or need follow up information about today's clinic visit or your schedule please contact [facility] directly at [phone].  Normal or non-critical lab and imaging results  "will be communicated to you by MyChart, letter or phone within 4 business days after the clinic has received the results. If you do not hear from us within 7 days, please contact the clinic through [email or web address] or phone. If you have a critical or abnormal lab result, we will notify you by phone as soon as possible.  Submit refill requests through [email or web address] or call your pharmacy and they will forward the refill request to us. Please allow 3 business days for your refill to be completed.          Additional Information About Your Visit        [email or web address] Information     [email or web address] gives you secure access to your electronic health record. If you see a primary care provider, you can also send messages to your care team and make appointments. If you have questions, please call your primary care clinic.  If you do not have a primary care provider, please call [phone] and they will assist you.        Care EveryWhere ID     This is your Care EveryWhere ID. This could be used by other organizations to access your [place] medical records  [id number]        Your Vitals Were     Height Last Period BMI (Body Mass Index)             5' 7\" (1.702 m) 02/22/2018 37.46 kg/m2          Blood Pressure from Last 3 Encounters:   11/21/18 110/68   11/20/18 116/70   11/15/18 110/70    Weight from Last 3 Encounters:   11/21/18 239 lb 3.2 oz (108.5 kg)   11/15/18 239 lb 8 oz (108.6 kg)   11/08/18 236 lb 11.2 oz (107.4 kg)               Primary Care Provider Office Phone # Fax #    Susan Rachele Haase, APRN [phone]       [address] [facility] [address]        Equal Access to Services     CLARA JACOBSON : Hadii jillian Zuluaga, donna gupta, yaw segovia. So [facility] [phone].    ATENCIÓN: Si habla español, tiene a gary disposición servicios gratuitos de asistencia lingüística. Llame al [phone].    We comply with applicable federal civil rights laws and " [place] laws. We do not discriminate on the basis of race, color, national origin, age, disability, sex, sexual orientation, or gender identity.            Thank you!     Thank you for choosing [facility]  for your care. Our goal is always to provide you with excellent care. Hearing back from our patients is one way we can continue to improve our services. Please take a few minutes to complete the written survey that you may receive in the mail after your visit with us. Thank you!             Your Updated Medication List - Protect others around you: Learn how to safely use, store and throw away your medicines at www.disposemymeds.org.          This list is accurate as of 11/21/18  2:57 PM.  Always use your most recent med list.                   Brand Name Dispense Instructions for use Diagnosis    CHEWABLE MULTIPLE VITAMINS OR       Supervision of normal first pregnancy, antepartum       ferrous sulfate 325 (65 Fe) MG tablet    IRON    30 tablet    Take 1 tablet (325 mg) by mouth daily (with breakfast)    Other iron deficiency anemia       ranitidine 300 MG tablet    ZANTAC    90 tablet    Take 1 tablet (300 mg) by mouth At Bedtime    Gastroesophageal reflux disease without esophagitis

## 2018-11-21 NOTE — PATIENT INSTRUCTIONS
Return weekly   Set up BPP on Friday   Return to clinic:  every 4 weeks till 30 weeks, then every 2 weeks till 36 weeks, then weekly till delivery      Phone numbers [place]:  Day/ night [phone] ask for ob triage  Emergency:  Call labor and delivery:  [phone]    What should I call about??    Contraction every 5 minutes for 1 hour 1 minute long (511), bleeding, loss of fluid, headache that doesn't resolve with tylenol, and decreased fetal movement     Start kick counts @ 26-28 weeks   Keep track of movement and discover your normal baby movement pattern   guideline is listed below  Please call if you do not feel the baby move!  We will have you come in for fetal heart rate monitoring:   Perception of at least 10 FMs during 12 hours of normal maternal activity   Perception of least 10 FMs over two hours when the mother is at rest and focused on [facility] Address   [address], [place], [place] [address] [phone]    Dr. Ashley Hernandez, DO    OB/GYN   [facility] and [facility]

## 2018-11-21 NOTE — TELEPHONE ENCOUNTER
Seen on wed, repeat BPP on Friday   Per uptodate, СВЕТЛАНА > 24 is poly   So did not call mfm, will await repeat СВЕТЛАНА    Dr. Ashley Hernandez, DO    Obstetrics and Gynecology  [facility] [place] [place]

## 2018-11-25 ENCOUNTER — NURSE TRIAGE (OUTPATIENT)
Dept: NURSING | Facility: CLINIC | Age: 23
End: 2018-11-25

## 2018-11-25 ENCOUNTER — HOSPITAL ENCOUNTER (OUTPATIENT)
Facility: CLINIC | Age: 23
Discharge: HOME | End: 2018-11-25
Attending: OBSTETRICS & GYNECOLOGY | Admitting: OBSTETRICS & GYNECOLOGY
Payer: COMMERCIAL

## 2018-11-25 VITALS — DIASTOLIC BLOOD PRESSURE: 76 MMHG | RESPIRATION RATE: 18 BRPM | TEMPERATURE: 98.1 F | SYSTOLIC BLOOD PRESSURE: 127 MMHG

## 2018-11-25 PROCEDURE — 99213 OFFICE O/P EST LOW 20 MIN: CPT | Mod: 25 | Performed by: OBSTETRICS & GYNECOLOGY

## 2018-11-25 PROCEDURE — 59025 FETAL NON-STRESS TEST: CPT | Mod: 26 | Performed by: OBSTETRICS & GYNECOLOGY

## 2018-11-25 PROCEDURE — G0463 HOSPITAL OUTPT CLINIC VISIT: HCPCS

## 2018-11-25 PROCEDURE — 59025 FETAL NON-STRESS TEST: CPT

## 2018-11-25 PROCEDURE — 40000809 ZZH STATISTIC NO DOCUMENTATION TO SUPPORT CHARGE

## 2018-11-25 NOTE — PROGRESS NOTES
Data: Patient assessed in the [facility] for uterine contractions.  Cervical exam fingertip dilated.  Membranes intact.  Contractions occas.  Action:  Presumed adequate fetal oxygenation documented (see flow record). Discharge instructions reviewed.  Patient instructed to report change in fetal movement, vaginal leaking of fluid or bleeding, abdominal pain, or any concerns related to the pregnancy to her nurse/physician.    Response: Orders to discharge home per Eugenia Gardiner.  Patient verbalized understanding of education and verbalized agreement with plan. Discharged to home at 1655.

## 2018-11-25 NOTE — PLAN OF CARE
Data: Patient presented to [facility]: 2018  1:36 PM.  Reason for maternal/fetal assessment is uterine contractions. Patient reports ctx's that started yesterday.  Patient is a .  Prenatal record reviewed. Pregnancy has been uncomplicated..  Gestational Age 39w3d. VSS. Fetal movement present. Patient denies leaking of vaginal fluid/rupture of membranes, vaginal bleeding. Support person is present.   Action: Verbal consent for EFM. Triage assessment completed. Bill of rights reviewed.  Response: Patient verbalized agreement with plan. Will contact Dr Eugenia Gardiner with update and further orders.

## 2018-11-25 NOTE — IP AVS SNAPSHOT
[facility] Labor and Delivery    [address]    [facility] [address]    Phone:  [phone]    Fax:  [phone]                                       After Visit Summary   11/25/2018    Ana Mauricio    MRN: [id number]           After Visit Summary Signature Page     I have received my discharge instructions, and my questions have been answered. I have discussed any challenges I see with this plan with the nurse or doctor.    ..........................................................................................................................................  Patient/Patient Representative Signature      ..........................................................................................................................................  Patient Representative Print Name and Relationship to Patient    ..................................................               ................................................  Date                                   Time    ..........................................................................................................................................  Reviewed by Signature/Title    ...................................................              ..............................................  Date                                               Time          22EPIC Rev 08/18

## 2018-11-25 NOTE — PROGRESS NOTES
Obstetrics Triage Note    HPI:  Ana Mauricio is a 23 year old  female at 39w3d here with complaints of contractions.    She states that she has otherwise been feeling well. She denies fever, N/V, chest pain, SOB, abdominal pain, vaginal bleeding, leakage of fluid, vaginal discharge, dysuria, constipation.    ROS:  Negative except as mentioned in HPI.    PMH:  Past Medical History:   Diagnosis Date     Anemia      Attention deficit disorder without mention of hyperactivity     not on meds now, tried several     Cellulitis and abscess of trunk 11/10/2017     Hyperlipidemia LDL goal <160 2011       PSHx:  Past Surgical History:   Procedure Laterality Date     NO HISTORY OF SURGERY         Medications:    No current facility-administered medications on file prior to encounter.   Current Outpatient Prescriptions on File Prior to Encounter:  ferrous sulfate (IRON) 325 (65 Fe) MG tablet Take 1 tablet (325 mg) by mouth daily (with breakfast)   ranitidine (ZANTAC) 300 MG tablet Take 1 tablet (300 mg) by mouth At Bedtime   Pediatric Multiple Vitamins (CHEWABLE MULTIPLE VITAMINS OR)         Allergies:     Allergies   Allergen Reactions     Pork Allergy      Protonix [Pantoprazole] Hives     Strattera [Atomoxetine Hydrochloride]      palpitations       Physical Exam:   Vitals:    18 1348   BP: 127/76   Resp: 18   Temp: 98.1  F (36.7  C)   TempSrc: Oral      Gen: appears comfortable  Abd: soft, gravid, non-tender, non-distended  Cervix: FT / 50 / -3 per RN, unchanged on recheck  NST:  FHT: , mod dayron, + accels, no decels  [place]: initially Q 4-7 min, irregular at time of discharge    Labs/Imaging:  No results found for this or any previous visit (from the past 24 hour(s)).    Assessment/Plan: Ana Mauricio is a 23 year old female  at 39w3d here for rule out labor  - No cervical change, reactive NST. Appears comfortable; not in labor.  - Dispo: to home with follow up in the next week. Discussed labor  warning signs, fetal kick counts and indication to return to care.    Eugenia Gardiner DO  OB/GYN     11/25/2018 4:39 PM

## 2018-11-25 NOTE — IP AVS SNAPSHOT
MRN:[id number]                      After Visit Summary   11/25/2018    Ana Mauricio    MRN: [id number]           Thank you!     Thank you for choosing [facility] for your care. Our goal is always to provide you with excellent care. Hearing back from our patients is one way we can continue to improve our services. Please take a few minutes to complete the written survey that you may receive in the mail after you visit. If you would like to speak to someone directly about your visit please contact Patient Relations at [phone]. Thank you!          Patient Information     Date Of Birth          1995        About your hospital stay     You were admitted on:  November 25, 2018 You last received care in the:  [facility] Labor and Delivery    You were discharged on:  November 25, 2018       Who to Call     For medical emergencies, please call 911.  For non-urgent questions about your medical care, please call your primary care provider or clinic, [phone]          Attending Provider     Provider Eugenia Lee DO OB/Gyn       Primary Care Provider Office Phone # Fax #    Susan Rachele Haase, APRN [phone]      Your next 10 appointments already scheduled     Nov 26, 2018  1:15 PM CST   US FETAL BIOPHYS PROFILE W/O NON STRESS TEST with OXUS1   [facility]    [address] [place] [address] [phone]           How do I prepare for my exam? (Food and drink instructions) Drink four 8-ounce glasses of fluid. Finish drinking an hour before your exam, so you have a full bladder. If you need to empty your bladder before your exam, try to release only a little urine. Then, drink another glass of fluid.  How do I prepare for my exam? (Other instructions) You may have up to two family members in the exam room. If you bring a small child, an adult must be there to  care for him or her. No video or camera photography during the procedure.  What should I wear: Wear comfortable clothes.  How long does the exam take: Most ultrasounds take 30 to 60 minutes.  What should I bring: Bring a list of your medicines, including vitamins, minerals and over-the-counter drugs. It is safest to leave personal items at home.  Do I need a :  No  is needed.  What do I need to tell my doctor: Tell your doctor about any allergies you may have.  What should I do after the exam: No restrictions, you may resume normal activities.  What is this test: An ultrasound uses sound waves to make pictures of the body. Sound waves do not cause pain. The only discomfort may be the pressure of the wand against your skin or full bladder.  Who should I call with questions: If you have any questions, please call the Imaging Department where you will have your exam. Directions, parking instructions, and other information are available on our website, [email or web address].            Nov 29, 2018  3:45 PM CST   ESTABLISHED PRENATAL with Karne Ledezma MD   [facility] ([facility])    [address]  [address]  [facility] [id number] [phone]              Further instructions from your care team       Discharge Instruction for Undelivered Patients      You were seen for: Labor Assessment  We Consulted: Dr. Lawson  You had (Test or Medicine):fetal monitoring     Diet:   Drink 8 to 12 glasses of liquids (milk, juice, water) every day.  You may eat meals and snacks.     Activity:  Call your doctor or nurse midwife if your baby is moving less than usual.     Call your provider if you notice:  Swelling in your face or increased swelling in your hands or legs.  Headaches that are not relieved by Tylenol (acetaminophen).  Changes in your vision (blurring: seeing spots or stars.)  Nausea (sick to your stomach) and vomiting (throwing up).   Weight gain of 5 pounds or  more per week.  Heartburn that doesn't go away.  Signs of bladder infection: pain when you urinate (use the toilet), need to go more often and more urgently.  The bag of carias (rupture of membranes) breaks, or you notice leaking in your underwear.  Bright red blood in your underwear.  Abdominal (lower belly) or stomach pain.  For first baby: Contractions (tightening) less than 5 minutes apart for one hour or more.  Second (plus) baby: Contractions (tightening) less than 10 minutes apart and getting stronger.  *If less than 34 weeks: Contractions (tightenings) more than 6 times in one hour.  Increase or change in vaginal discharge (note the color and amount)      Follow-up:  As scheduled in the clinic          Pending Results     No orders found from 11/23/2018 to 11/26/2018.            Admission Information     Date & Time Provider Department Dept. Phone    11/25/2018 Eugenia Gardiner DO [facility] Labor and Delivery [phone]      Your Vitals Were     Blood Pressure Temperature Respirations Last Period          127/76 98.1  F (36.7  C) (Oral) 18 02/22/2018        MyChart Information     [employer] gives you secure access to your electronic health record. If you see a primary care provider, you can also send messages to your care team and make appointments. If you have questions, please call your primary care clinic.  If you do not have a primary care provider, please call [phone] and they will assist you.        Care EveryWhere ID     This is your Care EveryWhere ID. This could be used by other organizations to access your [place] medical records  [id number]        Equal Access to Services     CLARA JACOBSON : Hadclaudia dior [place], waaxda luqadaha, qaybta kaalmada celia, yaw kennedy. So [facility] [phone].    ATENCIÓN: Si habla español, tiene a gary disposición servicios gratuitos de asistencia lingüística. Llame al [phone].    We comply with applicable  federal civil rights laws and [place] laws. We do not discriminate on the basis of race, color, national origin, age, disability, sex, sexual orientation, or gender identity.               Review of your medicines      UNREVIEWED medicines. Ask your doctor about these medicines        Dose / Directions    CHEWABLE MULTIPLE VITAMINS OR   Used for:  Supervision of normal first pregnancy, antepartum        Refills:  0       ferrous sulfate 325 (65 Fe) MG tablet   Commonly known as:  IRON   Used for:  Other iron deficiency anemia        Dose:  325 mg   Take 1 tablet (325 mg) by mouth daily (with breakfast)   Quantity:  30 tablet   Refills:  2       PRENATAL 1 PO        Dose:  1 tablet   Take 1 tablet by mouth   Refills:  0       ranitidine 300 MG tablet   Commonly known as:  ZANTAC   Used for:  Gastroesophageal reflux disease without esophagitis        Dose:  300 mg   Take 1 tablet (300 mg) by mouth At Bedtime   Quantity:  90 tablet   Refills:  1                Protect others around you: Learn how to safely use, store and throw away your medicines at www.disposemymeds.org.             Medication List: This is a list of all your medications and when to take them. Check marks below indicate your daily home schedule. Keep this list as a reference.      Medications           Morning Afternoon Evening Bedtime As Needed    CHEWABLE MULTIPLE VITAMINS OR                                ferrous sulfate 325 (65 Fe) MG tablet   Commonly known as:  IRON   Take 1 tablet (325 mg) by mouth daily (with breakfast)                                PRENATAL 1 PO   Take 1 tablet by mouth                                ranitidine 300 MG tablet   Commonly known as:  ZANTAC   Take 1 tablet (300 mg) by mouth At Bedtime

## 2018-11-25 NOTE — DISCHARGE INSTRUCTIONS
Discharge Instruction for Undelivered Patients      You were seen for: Labor Assessment  We Consulted: Dr. Lawson  You had (Test or Medicine):fetal monitoring     Diet:   Drink 8 to 12 glasses of liquids (milk, juice, water) every day.  You may eat meals and snacks.     Activity:  Call your doctor or nurse midwife if your baby is moving less than usual.     Call your provider if you notice:  Swelling in your face or increased swelling in your hands or legs.  Headaches that are not relieved by Tylenol (acetaminophen).  Changes in your vision (blurring: seeing spots or stars.)  Nausea (sick to your stomach) and vomiting (throwing up).   Weight gain of 5 pounds or more per week.  Heartburn that doesn't go away.  Signs of bladder infection: pain when you urinate (use the toilet), need to go more often and more urgently.  The bag of carias (rupture of membranes) breaks, or you notice leaking in your underwear.  Bright red blood in your underwear.  Abdominal (lower belly) or stomach pain.  For first baby: Contractions (tightening) less than 5 minutes apart for one hour or more.  Second (plus) baby: Contractions (tightening) less than 10 minutes apart and getting stronger.  *If less than 34 weeks: Contractions (tightenings) more than 6 times in one hour.  Increase or change in vaginal discharge (note the color and amount)      Follow-up:  As scheduled in the clinic

## 2018-11-25 NOTE — TELEPHONE ENCOUNTER
"    Reason for Disposition    [1] First baby (primipara) AND [2] contractions < 6 minutes apart  AND [3] present 2 hours    Additional Information    Negative: Passed out (i.e., lost consciousness, collapsed and was not responding)    Negative: Shock suspected (e.g., cold/pale/clammy skin, too weak to stand, low BP, rapid pulse)    Negative: Difficult to awaken or acting confused  (e.g., disoriented, slurred speech)    Negative: [1] SEVERE abdominal pain (e.g., excruciating) AND [2] constant AND [3] present > 1 hour    Negative: Severe bleeding (e.g., continuous red blood from vagina, or large blood clots)    Negative: Umbilical cord hanging out of the vagina (shiny, white, curled appearance, \"like telephone cord\")    Negative: Uncontrollable urge to push (i.e., feels like baby is coming out now)    Negative: Can see baby    Negative: Sounds like a life-threatening emergency to the triager    Negative: Pregnant < 37 weeks (i.e., )    Negative: [1] Uncertain delivery date AND [2] possibly pregnant < 37 weeks (i.e., )    Answer Assessment - Initial Assessment Questions  1. ONSET: \"When did the symptoms begin?\"         yesterday  2. CONTRACTIONS: \"Describe the contractions that you are having.\" (e.g., duration, frequency, regularity, severity)      4-7 minutes  3. SEDRICK: \"What date are you expecting to deliver?\"      18  4. PARITY: \"Have you had a baby before?\" If yes, \"How long did the labor last?\"      first  5. FETAL MOVEMENT: \"Has the baby's movement decreased or changed significantly from normal?\"      normal  6. OTHER SYMPTOMS: \"Do you have any other symptoms?\" (e.g., leaking fluid from vagina, vaginal bleeding, fever, hand/facial swelling)      x2 spots of blood on panties, quarter sized    Protocols used: PREGNANCY - LABOR-ADULT-AH      "

## 2018-11-26 ENCOUNTER — RADIANT APPOINTMENT (OUTPATIENT)
Dept: ULTRASOUND IMAGING | Facility: CLINIC | Age: 23
End: 2018-11-26
Attending: FAMILY MEDICINE
Payer: COMMERCIAL

## 2018-11-26 ENCOUNTER — HOSPITAL ENCOUNTER (INPATIENT)
Facility: CLINIC | Age: 23
LOS: 3 days | Discharge: HOME | End: 2018-11-30
Attending: OBSTETRICS & GYNECOLOGY | Admitting: OBSTETRICS & GYNECOLOGY
Payer: COMMERCIAL

## 2018-11-26 ENCOUNTER — NURSE TRIAGE (OUTPATIENT)
Dept: NURSING | Facility: CLINIC | Age: 23
End: 2018-11-26

## 2018-11-26 DIAGNOSIS — Z34.00 SUPERVISION OF NORMAL FIRST PREGNANCY, ANTEPARTUM: ICD-10-CM

## 2018-11-26 PROCEDURE — 76815 OB US LIMITED FETUS(S): CPT | Performed by: OBSTETRICS & GYNECOLOGY

## 2018-11-26 PROCEDURE — 76819 FETAL BIOPHYS PROFIL W/O NST: CPT | Performed by: OBSTETRICS & GYNECOLOGY

## 2018-11-26 NOTE — IP AVS SNAPSHOT
[facility]    [address]    [facility] [address]    Phone:  [phone]    Fax:  [phone]                                       After Visit Summary   11/26/2018    Ana Mauricio    MRN: [id number]           After Visit Summary Signature Page     I have received my discharge instructions, and my questions have been answered. I have discussed any challenges I see with this plan with the nurse or doctor.    ..........................................................................................................................................  Patient/Patient Representative Signature      ..........................................................................................................................................  Patient Representative Print Name and Relationship to Patient    ..................................................               ................................................  Date                                   Time    ..........................................................................................................................................  Reviewed by Signature/Title    ...................................................              ..............................................  Date                                               Time          22EPIC Rev 08/18

## 2018-11-26 NOTE — IP AVS SNAPSHOT
MRN:[id number]                      After Visit Summary   11/26/2018    Ana Mauricio    MRN: [id number]           Thank you!     Thank you for choosing [facility] for your care. Our goal is always to provide you with excellent care. Hearing back from our patients is one way we can continue to improve our services. Please take a few minutes to complete the written survey that you may receive in the mail after you visit. If you would like to speak to someone directly about your visit please contact Patient Relations at [phone]. Thank you!          Patient Information     Date Of Birth          1995        About your hospital stay     You were admitted on:  November 26, 2018 You last received care in the:  [facility]    You were discharged on:  November 30, 2018        Reason for your hospital stay       Maternity care                  Who to Call     For medical emergencies, please call 911.  For non-urgent questions about your medical care, please call your primary care provider or clinic, [phone]          Attending Provider     Provider Specialty    Jer Curiel MD OB/Gyn    Sabal, Per Wheatley MD OB/Gyn       Primary Care Provider Office Phone # Fax #    Susan Rachele Haase, KELLIE [phone]      After Care Instructions     Activity       Review discharge instructions            Diet       Resume previous diet            Discharge Instructions - Postpartum visit       Schedule postpartum visit with your provider and return to clinic in 6 weeks.                  Further instructions from your care team       Postpartum pain control:    You will likely experience cramping for 1-2 weeks.   You can take up to 3 tabs of ibuprofen (600mg) every 6 hours as needed for pain.  You can additionally take 1-2 tabs of tylenol (325-650mg regular strength 500-1000mg extra strength), every 6 hours for additional pain control as  needed.  It is best to alternate your medications so you are taking something every 3 hours if you are having continued pain.    If you have vaginal pain you can take sitz baths 1-2x/day. Fill the tub with 2-3 inches of warm water and soak the perineal and vaginal area for 10 minutes. Ice or warm packs can also be applied for comfort.    Please call the office for:  Temperature above 100.4  Severe headache, especially with changes in your vision  Heavy bleeding (more than one pad an hour for more than 2 hours in a row)  Any other new, concerning symptoms.    Constipation  You may use the following products to ease constipation:    1. Stool softeners such as metamucil or benefiber  2. senna 1-2 times daily  3. Fiber supplements  4. miralax (over the counter).  5. Dulcolax    Please be sure to keep adequately hydrated; 6-8 8oz glasses daily, more if needed to compensate for exercise, sweating, etc.      More specific dosing can be found below:    - Metamucil 28g daily PO with 8oz of water  - senna-docusate 8.6-50 MG per tablet PO 1 tablet, Oral, 2 TIMES DAILY, Start with 1 tablet PO BID, reduce to 1 tablet daily when having daily BMs. Stop for loose stools.  - docusate sodium (COLACE) capsule 100 mg, Oral, 2 TIMES DAILY, To prevent constipation. Hold for loose stools.  - bisacodyl (DULCOLAX) suppository 10 mg, Rectal, DAILY PRN, constipation, Hold for loose stools.       Please contact the clinic with any questions.  Please schedule a follow up appointment with your primary OB/Gyn provider in 6 weeks.   You can respond with [facility] or call Mary at [phone].      Postpartum Vaginal Delivery Instructions    Activity       Ask family and friends for help when you need it.    Do not place anything in your vagina for 6 weeks.    You are not restricted on other activities, but take it easy for a few weeks to allow your body to recover from delivery.  You are able to do any activities you feel up to that  point.    No driving until you have stopped taking your pain medications (usually two weeks after delivery).     Call your health care provider if you have any of these symptoms:       Increased pain, swelling, redness, or fluid around your stiches from an episiotomy or perineal tear.    A fever above 100.4 F (38 C) with or without chills when placing a thermometer under your tongue.    You soak a sanitary pad with blood within 1 hour, or you see blood clots larger than a golf ball.    Bleeding that lasts more than 6 weeks.    Vaginal discharge that smells bad.    Severe pain, cramping or tenderness in your lower belly area.    A need to urinate more frequently (use the toilet more often), more urgently (use the toilet very quickly), or it burns when you urinate.    Nausea and vomiting.    Redness, swelling or pain around a vein in your leg.    Problems breastfeeding or a red or painful area on your breast.    Chest pain and cough or are gasping for air.    Problems coping with sadness, anxiety, or depression.  If you have any concerns about hurting yourself or the baby, call your provider immediately.     You have questions or concerns after you return home.     Keep your hands clean:  Always wash your hands before touching your perineal area and stitches.  This helps reduce your risk of infection.  If your hands aren't dirty, you may use an alcohol hand-rub to clean your hands. Keep your nails clean and short.        Pending Results     No orders found from 11/24/2018 to 11/27/2018.            Statement of Approval     Ordered          11/30/18 0909  I have reviewed and agree with all the recommendations and orders detailed in this document.  EFFECTIVE NOW     Approved and electronically signed by:  Marlen Orellana MD             Admission Information     Date & Time Provider Department Dept. Phone    11/26/2018 Per Manning MD [facility] [phone]      Your Vitals Were     Blood  "Pressure Pulse Temperature Respirations Height Weight    117/68 77 97.4  F (36.3  C) (Oral) 16 1.702 m (5' 7\") 107 kg (236 lb)    Last Period Pulse Oximetry BMI (Body Mass Index)             02/22/2018 99% 36.96 kg/m2         [facility] Information     [employer] gives you secure access to your electronic health record. If you see a primary care provider, you can also send messages to your care team and make appointments. If you have questions, please call your primary care clinic.  If you do not have a primary care provider, please call [phone] and they will assist you.        Care EveryWhere ID     This is your Care EveryWhere ID. This could be used by other organizations to access your [place] medical records  [id number]        Equal Access to Services     CLARA JACOBSON : Armando Zuluaga, donna gupta, paula yang, yaw kennedy. So [facility] [phone].    ATENCIÓN: Si habla español, tiene a gary disposición servicios gratuitos de asistencia lingüística. Llame al [phone].    We comply with applicable federal civil rights laws and [place] laws. We do not discriminate on the basis of race, color, national origin, age, disability, sex, sexual orientation, or gender identity.               Review of your medicines      CONTINUE these medicines which have NOT CHANGED        Dose / Directions    CHEWABLE MULTIPLE VITAMINS OR   Used for:  Supervision of normal first pregnancy, antepartum        Refills:  0       ferrous sulfate 325 (65 Fe) MG tablet   Commonly known as:  FEROSUL   Used for:  Other iron deficiency anemia        Dose:  325 mg   Take 1 tablet (325 mg) by mouth daily (with breakfast)   Quantity:  30 tablet   Refills:  2       PRENATAL 1 PO        Dose:  1 tablet   Take 1 tablet by mouth   Refills:  0       ranitidine 300 MG tablet   Commonly known as:  ZANTAC   Used for:  Gastroesophageal reflux disease without esophagitis        Dose:  300 mg "   Take 1 tablet (300 mg) by mouth At Bedtime   Quantity:  90 tablet   Refills:  1                Protect others around you: Learn how to safely use, store and throw away your medicines at www.disposemymeds.org.             Medication List: This is a list of all your medications and when to take them. Check marks below indicate your daily home schedule. Keep this list as a reference.      Medications           Morning Afternoon Evening Bedtime As Needed    CHEWABLE MULTIPLE VITAMINS OR                                ferrous sulfate 325 (65 Fe) MG tablet   Commonly known as:  FEROSUL   Take 1 tablet (325 mg) by mouth daily (with breakfast)                                PRENATAL 1 PO   Take 1 tablet by mouth                                ranitidine 300 MG tablet   Commonly known as:  ZANTAC   Take 1 tablet (300 mg) by mouth At Bedtime   Last time this was given:  300 mg on 11/27/2018 10:06 AM

## 2018-11-27 ENCOUNTER — ANESTHESIA (OUTPATIENT)
Dept: OBGYN | Facility: CLINIC | Age: 23
End: 2018-11-27
Payer: COMMERCIAL

## 2018-11-27 ENCOUNTER — ANESTHESIA EVENT (OUTPATIENT)
Dept: OBGYN | Facility: CLINIC | Age: 23
End: 2018-11-27
Payer: COMMERCIAL

## 2018-11-27 LAB
ABO + RH BLD: NORMAL
ABO + RH BLD: NORMAL
HGB BLD-MCNC: 11.4 G/DL (ref 11.7–15.7)
SPECIMEN EXP DATE BLD: NORMAL
T PALLIDUM AB SER QL: NONREACTIVE

## 2018-11-27 PROCEDURE — 27110038 ZZH RX 271

## 2018-11-27 PROCEDURE — 25000125 ZZHC RX 250: Performed by: OBSTETRICS & GYNECOLOGY

## 2018-11-27 PROCEDURE — 85018 HEMOGLOBIN: CPT | Performed by: OBSTETRICS & GYNECOLOGY

## 2018-11-27 PROCEDURE — 86901 BLOOD TYPING SEROLOGIC RH(D): CPT | Performed by: OBSTETRICS & GYNECOLOGY

## 2018-11-27 PROCEDURE — 25000125 ZZHC RX 250: Performed by: ANESTHESIOLOGY

## 2018-11-27 PROCEDURE — 37000011 ZZH ANESTHESIA WARD SERVICE

## 2018-11-27 PROCEDURE — 86780 TREPONEMA PALLIDUM: CPT | Performed by: OBSTETRICS & GYNECOLOGY

## 2018-11-27 PROCEDURE — 25000128 H RX IP 250 OP 636: Performed by: OBSTETRICS & GYNECOLOGY

## 2018-11-27 PROCEDURE — 25000125 ZZHC RX 250

## 2018-11-27 PROCEDURE — 10907ZC DRAINAGE OF AMNIOTIC FLUID, THERAPEUTIC FROM PRODUCTS OF CONCEPTION, VIA NATURAL OR ARTIFICIAL OPENING: ICD-10-PCS | Performed by: OBSTETRICS & GYNECOLOGY

## 2018-11-27 PROCEDURE — 12000029 ZZH R&B OB INTERMEDIATE

## 2018-11-27 PROCEDURE — 40000671 ZZH STATISTIC ANESTHESIA CASE

## 2018-11-27 PROCEDURE — 25000128 H RX IP 250 OP 636

## 2018-11-27 PROCEDURE — 00HU33Z INSERTION OF INFUSION DEVICE INTO SPINAL CANAL, PERCUTANEOUS APPROACH: ICD-10-PCS | Performed by: ANESTHESIOLOGY

## 2018-11-27 PROCEDURE — 25000132 ZZH RX MED GY IP 250 OP 250 PS 637: Performed by: OBSTETRICS & GYNECOLOGY

## 2018-11-27 PROCEDURE — 3E0R3BZ INTRODUCTION OF ANESTHETIC AGENT INTO SPINAL CANAL, PERCUTANEOUS APPROACH: ICD-10-PCS | Performed by: ANESTHESIOLOGY

## 2018-11-27 PROCEDURE — 86900 BLOOD TYPING SEROLOGIC ABO: CPT | Performed by: OBSTETRICS & GYNECOLOGY

## 2018-11-27 RX ORDER — SODIUM CHLORIDE, SODIUM LACTATE, POTASSIUM CHLORIDE, CALCIUM CHLORIDE 600; 310; 30; 20 MG/100ML; MG/100ML; MG/100ML; MG/100ML
INJECTION, SOLUTION INTRAVENOUS CONTINUOUS
Status: DISCONTINUED | OUTPATIENT
Start: 2018-11-27 | End: 2018-11-28

## 2018-11-27 RX ORDER — NALOXONE HYDROCHLORIDE 0.4 MG/ML
.1-.4 INJECTION, SOLUTION INTRAMUSCULAR; INTRAVENOUS; SUBCUTANEOUS
Status: DISCONTINUED | OUTPATIENT
Start: 2018-11-27 | End: 2018-11-28

## 2018-11-27 RX ORDER — METHYLERGONOVINE MALEATE 0.2 MG/ML
200 INJECTION INTRAVENOUS
Status: DISCONTINUED | OUTPATIENT
Start: 2018-11-27 | End: 2018-11-28

## 2018-11-27 RX ORDER — EPHEDRINE SULFATE 50 MG/ML
5 INJECTION, SOLUTION INTRAMUSCULAR; INTRAVENOUS; SUBCUTANEOUS
Status: DISCONTINUED | OUTPATIENT
Start: 2018-11-27 | End: 2018-11-30 | Stop reason: CLARIF

## 2018-11-27 RX ORDER — ACETAMINOPHEN 325 MG/1
975 TABLET ORAL EVERY 6 HOURS
Status: DISCONTINUED | OUTPATIENT
Start: 2018-11-28 | End: 2018-11-28

## 2018-11-27 RX ORDER — OXYTOCIN/0.9 % SODIUM CHLORIDE 30/500 ML
100-340 PLASTIC BAG, INJECTION (ML) INTRAVENOUS CONTINUOUS PRN
Status: COMPLETED | OUTPATIENT
Start: 2018-11-27 | End: 2018-11-28

## 2018-11-27 RX ORDER — FENTANYL CITRATE 50 UG/ML
50-100 INJECTION, SOLUTION INTRAMUSCULAR; INTRAVENOUS
Status: DISCONTINUED | OUTPATIENT
Start: 2018-11-27 | End: 2018-11-28

## 2018-11-27 RX ORDER — ACETAMINOPHEN 325 MG/1
650 TABLET ORAL EVERY 4 HOURS PRN
Status: DISCONTINUED | OUTPATIENT
Start: 2018-11-27 | End: 2018-11-28

## 2018-11-27 RX ORDER — OXYTOCIN/0.9 % SODIUM CHLORIDE 30/500 ML
1-24 PLASTIC BAG, INJECTION (ML) INTRAVENOUS CONTINUOUS
Status: DISCONTINUED | OUTPATIENT
Start: 2018-11-27 | End: 2018-11-28

## 2018-11-27 RX ORDER — OXYCODONE AND ACETAMINOPHEN 5; 325 MG/1; MG/1
1 TABLET ORAL
Status: DISCONTINUED | OUTPATIENT
Start: 2018-11-27 | End: 2018-11-28

## 2018-11-27 RX ORDER — CALCIUM CARBONATE 500 MG/1
1000 TABLET, CHEWABLE ORAL
Status: DISCONTINUED | OUTPATIENT
Start: 2018-11-27 | End: 2018-11-28

## 2018-11-27 RX ORDER — NALBUPHINE HYDROCHLORIDE 10 MG/ML
2.5-5 INJECTION, SOLUTION INTRAMUSCULAR; INTRAVENOUS; SUBCUTANEOUS EVERY 6 HOURS PRN
Status: DISCONTINUED | OUTPATIENT
Start: 2018-11-27 | End: 2018-11-30 | Stop reason: CLARIF

## 2018-11-27 RX ORDER — IBUPROFEN 800 MG/1
800 TABLET, FILM COATED ORAL
Status: DISCONTINUED | OUTPATIENT
Start: 2018-11-27 | End: 2018-11-28

## 2018-11-27 RX ORDER — ONDANSETRON 2 MG/ML
4 INJECTION INTRAMUSCULAR; INTRAVENOUS EVERY 6 HOURS PRN
Status: DISCONTINUED | OUTPATIENT
Start: 2018-11-27 | End: 2018-11-28

## 2018-11-27 RX ORDER — LIDOCAINE 40 MG/G
CREAM TOPICAL
Status: DISCONTINUED | OUTPATIENT
Start: 2018-11-27 | End: 2018-11-27

## 2018-11-27 RX ORDER — LIDOCAINE 40 MG/G
CREAM TOPICAL
Status: DISCONTINUED | OUTPATIENT
Start: 2018-11-27 | End: 2018-11-28

## 2018-11-27 RX ORDER — BUPIVACAINE HCL/0.9 % NACL/PF 0.125 %
PLASTIC BAG, INJECTION (ML) EPIDURAL CONTINUOUS
Status: DISCONTINUED | OUTPATIENT
Start: 2018-11-27 | End: 2018-11-28

## 2018-11-27 RX ORDER — MORPHINE SULFATE 10 MG/ML
12 INJECTION, SOLUTION INTRAMUSCULAR; INTRAVENOUS ONCE
Status: COMPLETED | OUTPATIENT
Start: 2018-11-27 | End: 2018-11-27

## 2018-11-27 RX ORDER — EPHEDRINE SULFATE 50 MG/ML
INJECTION, SOLUTION INTRAMUSCULAR; INTRAVENOUS; SUBCUTANEOUS
Status: COMPLETED
Start: 2018-11-27 | End: 2018-11-27

## 2018-11-27 RX ORDER — AMPICILLIN 2 G/1
2 INJECTION, POWDER, FOR SOLUTION INTRAVENOUS EVERY 6 HOURS
Status: DISCONTINUED | OUTPATIENT
Start: 2018-11-28 | End: 2018-11-28

## 2018-11-27 RX ORDER — OXYTOCIN 10 [USP'U]/ML
10 INJECTION, SOLUTION INTRAMUSCULAR; INTRAVENOUS
Status: DISCONTINUED | OUTPATIENT
Start: 2018-11-27 | End: 2018-11-28

## 2018-11-27 RX ORDER — BUPIVACAINE HCL/0.9 % NACL/PF 0.125 %
PLASTIC BAG, INJECTION (ML) EPIDURAL
Status: COMPLETED
Start: 2018-11-27 | End: 2018-11-27

## 2018-11-27 RX ORDER — FENTANYL CITRATE 50 UG/ML
INJECTION, SOLUTION INTRAMUSCULAR; INTRAVENOUS
Status: COMPLETED
Start: 2018-11-27 | End: 2018-11-27

## 2018-11-27 RX ORDER — CARBOPROST TROMETHAMINE 250 UG/ML
250 INJECTION, SOLUTION INTRAMUSCULAR
Status: DISCONTINUED | OUTPATIENT
Start: 2018-11-27 | End: 2018-11-28

## 2018-11-27 RX ADMIN — FENTANYL CITRATE 100 MCG: 50 INJECTION, SOLUTION INTRAMUSCULAR; INTRAVENOUS at 14:32

## 2018-11-27 RX ADMIN — Medication: at 15:32

## 2018-11-27 RX ADMIN — RANITIDINE 300 MG: 150 TABLET ORAL at 10:06

## 2018-11-27 RX ADMIN — MORPHINE SULFATE 12 MG: 10 INJECTION INTRAVENOUS at 02:13

## 2018-11-27 RX ADMIN — CALCIUM CARBONATE (ANTACID) CHEW TAB 500 MG 1000 MG: 500 CHEW TAB at 21:49

## 2018-11-27 RX ADMIN — EPHEDRINE SULFATE 5 MG: 50 INJECTION, SOLUTION INTRAVENOUS at 17:26

## 2018-11-27 RX ADMIN — EPHEDRINE SULFATE 5 MG: 50 INJECTION, SOLUTION INTRAVENOUS at 18:06

## 2018-11-27 RX ADMIN — EPHEDRINE SULFATE 5 MG: 50 INJECTION, SOLUTION INTRAVENOUS at 17:41

## 2018-11-27 RX ADMIN — SODIUM CHLORIDE, POTASSIUM CHLORIDE, SODIUM LACTATE AND CALCIUM CHLORIDE: 600; 310; 30; 20 INJECTION, SOLUTION INTRAVENOUS at 15:42

## 2018-11-27 RX ADMIN — FENTANYL CITRATE 100 MCG: 50 INJECTION, SOLUTION INTRAMUSCULAR; INTRAVENOUS at 21:12

## 2018-11-27 RX ADMIN — ACETAMINOPHEN 650 MG: 325 TABLET, FILM COATED ORAL at 23:16

## 2018-11-27 RX ADMIN — SODIUM CHLORIDE, POTASSIUM CHLORIDE, SODIUM LACTATE AND CALCIUM CHLORIDE: 600; 310; 30; 20 INJECTION, SOLUTION INTRAVENOUS at 12:25

## 2018-11-27 RX ADMIN — OXYTOCIN-SODIUM CHLORIDE 0.9% IV SOLN 30 UNIT/500ML 2 MILLI-UNITS/MIN: 30-0.9/5 SOLUTION at 12:29

## 2018-11-27 NOTE — PROVIDER NOTIFICATION
11/27/18 1615   Provider Notification   Provider Name/Title Dr. Salomon   Method of Notification At Bedside     New epidural placed.

## 2018-11-27 NOTE — PROVIDER NOTIFICATION
11/27/18 0014   Provider Notification   Provider Name/Title Dr. Curiel   Method of Notification Phone   Request Evaluate - Remote   Notification Reason Patient Arrived;Status Update;Labor Status     Dr. Curiel updated on Pt arrival. Was evaluated yesterday, cervix 0.5 cm. Has been tena all day with contractions becoming stronger. SVE on arrival of 1.5/65/-3, one hour later SVE 2/70/-3. Pt tena every 4-6 minutes, very uncomfortable and breathing well through contractions. Monitoring with Aby due to difficulty monitoring contractions. FRH category 2 due to intermittent variables, with moderate variability and accels. Plan for Pt to ambulate for one hour and reassess. Will update MD at that time for further orders.

## 2018-11-27 NOTE — TELEPHONE ENCOUNTER
"39w4d, pt reports painful contractions coming every 5 minutes, lasting 1 minute x 1.5-2 hrs.  Vaginal spotting present.      Disposition:  Go to L&D at [facility] (Per OB patient protocol for >36w with contractions every 5 min for > 1 hr).  Pt verbalized understanding and had no further questions.   Writer notified [facility] L&D    Miranda Jameson RN/JESSICA    Reason for Disposition    [1] First baby (primipara) AND [2] contractions < 6 minutes apart  AND [3] present 2 hours    Additional Information    Negative: Passed out (i.e., lost consciousness, collapsed and was not responding)    Negative: Shock suspected (e.g., cold/pale/clammy skin, too weak to stand, low BP, rapid pulse)    Negative: Difficult to awaken or acting confused  (e.g., disoriented, slurred speech)    Negative: [1] SEVERE abdominal pain (e.g., excruciating) AND [2] constant AND [3] present > 1 hour    Negative: Severe bleeding (e.g., continuous red blood from vagina, or large blood clots)    Negative: Umbilical cord hanging out of the vagina (shiny, white, curled appearance, \"like telephone cord\")    Negative: Uncontrollable urge to push (i.e., feels like baby is coming out now)    Negative: Can see baby    Negative: Sounds like a life-threatening emergency to the triager    Negative: Pregnant < 37 weeks (i.e., )    Negative: [1] Uncertain delivery date AND [2] possibly pregnant < 37 weeks (i.e., )    Protocols used: PREGNANCY - LABOR-ADULT-    "

## 2018-11-27 NOTE — ANESTHESIA PROCEDURE NOTES
Peripheral nerve/Neuraxial procedure note : epidural catheter  Pre-Procedure  Performed by NOMAN BROCK  Referred by SABAL  Location: OB    Procedure Times:11/27/2018 3:20 PM and 11/27/2018 3:33 PM  Pre-Anesthestic Checklist: patient identified, IV checked, site marked, risks and benefits discussed, informed consent, monitors and equipment checked, pre-op evaluation and at physician/surgeon's request    Timeout  Correct Patient: Yes   Correct Procedure: Yes   Correct Site: Yes   Correct Laterality: Yes and N/A   Correct Position: Yes   Site Marked: Yes, N/A   .   Procedure Documentation    .    Procedure:    Epidural catheter.     .  .       Assessment/Narrative  .  .  . Comments:  Pre-Procedure  Performed by Noman Brock MD  Location: OB.      PreAnesthestic Checklist: patient identified, IV checked, risks and benefits discussed, informed consent obtained, monitors and equipment checked, pre-op evaluation and at physician/surgeon's request.    Timeout   Correct Patient: Yes  Correct Procedure: Epidural catheter placement  Correct Site: Yes   Correct Position: Yes    Procedure Documentation  Procedure:   Epidural catheter block for Labor    Patient currently in labor and she and OBMD request a labor epidural to control her labor pains. Patient was interviewed and examined. Procedure and risks including but not limited to bleeding, infection, nerve injury, paralysis, PDPH, and inadequate block requiring intervention discussed with patient. Questions answered. This epidural is to be placed in anticipation of vaginal delivery.  She consents to the epidural procedure.  Time-out was performed.  I or my partners remain immediately available for management of any issues or complications and will monitor at appropriate intervals.  Procedure: Patient sitting. Betadine prep x 3. Sterile drape applied.  Mask and gloves used.  Lidocaine 1%  local infiltration at L 3-4.  17 G. Tuohy needle at L3-4 by loss of resistance  into epidural space.  No CSF, paresthesia or blood. 1.5 % Lidocaine with 1:200,000 Epinephrine 5cc test dose. Epidural catheter inserted w/o resistance to 5 cm in epidural space. Then 0.25% bupivicaine 10 cc with NS 5 cc.  Aspiration negative for blood and CSF.   Negative for neuro change, paresthesia or symptoms of intravascular injection or intrathecal injection.  Infusion orders written and infusion of 0.125% bupivicaine 15cc per hour started.    Noman Salomon MD

## 2018-11-27 NOTE — PLAN OF CARE
Data: [facility]: 2018 10:22 PM.  Reason for maternal/fetal assessment is uterine contractions. Patient reports CTX that have continued since discharged from here that are getting worse.  Patient is a .  Prenatal record reviewed. Pregnancy  has been complicated by has been uncomplicated.  Gestational Age 39w4d. VSS. Fetal movement present. Patient denies leaking of vaginal fluid/rupture of membranes, vaginal bleeding, abdominal pain, pelvic pressure, vomiting, headache, visual disturbances, epigastric or URQ pain, significant edema. Support person is present.   Action: Verbal consent for EFM. Triage assessment completed. Bill of rights reviewed.  Response: Patient verbalized agreement with plan. Will contact Dr Jer Curiel with update and further orders.

## 2018-11-27 NOTE — PROVIDER NOTIFICATION
11/27/18 0710   Provider Notification   Provider Name/Title Dr. Manning   Method of Notification In Department   Request Evaluate - Remote   Notification Reason SVE;Status Update     Dr. Manning updated on Pt status. SVE 2.5/70/-3. Hector every 1.5-8 minutes, breathing through contractions but able to rest and sleep overnight. MD will assess and make plan of care with patient.

## 2018-11-27 NOTE — PROVIDER NOTIFICATION
11/27/18 1520   Provider Notification   Provider Name/Title Dr. Salomon   Method of Notification At Bedside     Epidural placement

## 2018-11-27 NOTE — ANESTHESIA PREPROCEDURE EVALUATION
PAC NOTE:       ANESTHESIA PRE EVALUATION:  Anesthesia Evaluation       history and physical reviewed .      No history of anesthetic complications          ROS/MED HX    ENT/Pulmonary:  - neg pulmonary ROS     Neurologic:  - neg neurologic ROS     Cardiovascular:  - neg cardiovascular ROS       METS/Exercise Tolerance:     Hematologic:         Musculoskeletal:         GI/Hepatic:     (+) GERD      (-) hepatitis   Renal/Genitourinary:         Endo:         Psychiatric:         Infectious Disease:         Malignancy:         Other:                     Physical Exam  Normal systems: cardiovascular and pulmonary    Airway   Mallampati: II    Dental     Cardiovascular       Pulmonary           (-) no pre-eclampsia and gestational diabetes    obese      Anesthesia Plan      History & Physical Review      ASA Status:  .  OB Epidural Asa: 2            Postoperative Care      Consents  Anesthetic plan, risks, benefits and alternatives discussed with:  Patient..                            .

## 2018-11-27 NOTE — PROVIDER NOTIFICATION
11/27/18 1016   Provider Notification   Provider Name/Title Dr Manning   Method of Notification At Bedside   Request Evaluate in Person   Notification Reason Membrane Status;SVE   Dr Manning bedside for AROM clear fluid

## 2018-11-27 NOTE — H&P
"     OB Admit History & Physical  2018    Ana Mauricio  [id number]    History of Present Illness:   Ana Mauricio  Is a 23 year old female who is  being seen for irregular contractions and early labor.  Her Estimated Date of Delivery is 2018, and gestational age is 39w5d.  Her last menstrual period was Patient's last menstrual period was 2018.. Patient was seen for ctx and possible labor on  and discharged w/o cervical change (0.5/50/-3).    She returned at 2230 yesterday with ongoing ctx.  Cervix 1.5/65/-3.  Overnight received morphine for a therapeutic rest.    Now 3.5/70/-3.  Discussed augmentation as ctx remain irregular.  AROM clear fluid at 1030.  Discussed possible need for pitocin augmentation.    OB History:   Estimated body mass index is 36.96 kg/(m^2) as calculated from the following:    Height as of this encounter: 1.702 m (5' 7\").    Weight as of this encounter: 107 kg (236 lb).  Obstetric History       T0      L0     SAB0   TAB0   Ectopic0   Multiple0   Live Births0       # Outcome Date GA Lbr Fransisco/2nd Weight Sex Delivery Anes PTL Lv   1 Current                   Her prenatal course has been uncomplicated.  Estimated fetal weight =  3600gm    Past Medical History:   Past Medical History:   Diagnosis Date     Anemia      Attention deficit disorder without mention of hyperactivity     not on meds now, tried several     Cellulitis and abscess of trunk 11/10/2017     Hyperlipidemia LDL goal <160 2011     Past Surgical History:   Procedure Laterality Date     NO HISTORY OF SURGERY         Medications:   No current outpatient prescriptions on file.       Allergies:   Pork allergy; Protonix [pantoprazole]; and Strattera [atomoxetine hydrochloride]          Physical Exam:  Vitals:  Blood pressure 123/83, pulse 83, temperature 98  F (36.7  C), temperature source Oral, resp. rate 16, height 1.702 m (5' 7\"), weight 107 kg (236 lb), last menstrual period " 02/22/2018, SpO2 98 %, not currently breastfeeding.   FHT rate at 150 bpm ,with contractions every  1-6 minutes.  GEN: Alert Awake in NAD  Abdomen gravid  NT  Cervix and Dilation: 3.5/70/-3/posterior  Ext NT      Labs:   Hemoglobin   Date Value Ref Range Status   09/11/2018 9.5 (L) 11.7 - 15.7 g/dL Final   04/12/2018 11.7 11.7 - 15.7 g/dL Final     No results found for: RUBELLAABIGG   Lab Results   Component Value Date    ABO O 04/12/2018           Lab Results   Component Value Date    RH Pos 04/12/2018        Assessment and Plan:   Intrauterine pregnancy at 39w5d who presents in early labor    1. Admit to L&D  2. AROM performed at 1030 with clear fluid noted  3. Pitocin augmentation prn  4. Monitor progress        Per Manning   Dept of OB/GYN  November 27, 2018

## 2018-11-27 NOTE — ADDENDUM NOTE
Addendum  created 11/27/18 1601 by Noman Salomon MD    Anesthesia Event edited, Procedure Event Log accessed

## 2018-11-27 NOTE — PROVIDER NOTIFICATION
11/27/18 1500   Provider Notification   Provider Name/Title Dr. Manning   Method of Notification At Bedside   Request Evaluate in Person   Notification Reason Decels;Pain;Uterine Activity;SVE   Paged Dr. Manning discussed SVE 6/90/-1, contractions every 1-5 minutes with occasional coupling. Category II strip with baseline 145, moderate variability, at times reoccurant variable decels and late decel x2 that have resolved. Fentanyl given with flush running for epidural. Plan is for Sabal to be on unit this afternoon.

## 2018-11-27 NOTE — PROVIDER NOTIFICATION
11/27/18 0940   Provider Notification   Provider Name/Title Dr Manning   Method of Notification At Bedside   Request Evaluate in Person   Notification Reason Uterine Activity;Pain;Status Update   Dr Manning bedside to consult pt on options including discharging to home or augmenting with AROM and Pitocin. Pt discussing with family.

## 2018-11-27 NOTE — ADDENDUM NOTE
Addendum  created 11/27/18 1719 by Noman Salomon MD    Anesthesia Event edited, Procedure Event Log accessed

## 2018-11-27 NOTE — PROVIDER NOTIFICATION
11/27/18 1220   Provider Notification   Provider Name/Title Dr. Manning   Method of Notification At Bedside   Request Evaluate in Person   Notification Reason Uterine Activity;Status Update   Dr. Manning at bedside. Discussing infrequent contraction pattern with pt and plan to start pitocin. SVE deferred per MD.

## 2018-11-27 NOTE — PROVIDER NOTIFICATION
11/27/18 0149   Provider Notification   Provider Name/Title Dr. Cruiel   Method of Notification Phone   Request Evaluate - Remote   Notification Reason SVE;Status Update;Labor Status     Dr. Curiel updated on Pt labor status. Walked for approximately one hour, states she feels her contractions became stronger and more frequent. Hector every 5-8 minutes when back on monitoring. FRH category 2 due to intermittent variables. Moderate variability and accels present. SVE unchanged, 2/70/-3. Verbal orders from MD for 12 mg Morphine IM once and continued observation status. Pt to rest as able overnight. Will update MD as appropriate.

## 2018-11-27 NOTE — PROVIDER NOTIFICATION
11/27/18 1557   Provider Notification   Provider Name/Title Dr. Salomon   Method of Notification At Bedside     Epidural rebolus.

## 2018-11-27 NOTE — ADDENDUM NOTE
Addendum  created 11/27/18 1634 by Noman Salomon MD    Anesthesia Event edited, Procedure Event Log accessed

## 2018-11-28 PROCEDURE — 72200001 ZZH LABOR CARE VAGINAL DELIVERY SINGLE

## 2018-11-28 PROCEDURE — 25000128 H RX IP 250 OP 636: Performed by: OBSTETRICS & GYNECOLOGY

## 2018-11-28 PROCEDURE — 59400 OBSTETRICAL CARE: CPT | Performed by: OBSTETRICS & GYNECOLOGY

## 2018-11-28 PROCEDURE — 0KQM0ZZ REPAIR PERINEUM MUSCLE, OPEN APPROACH: ICD-10-PCS | Performed by: OBSTETRICS & GYNECOLOGY

## 2018-11-28 PROCEDURE — 25000132 ZZH RX MED GY IP 250 OP 250 PS 637: Performed by: OBSTETRICS & GYNECOLOGY

## 2018-11-28 PROCEDURE — 12000013 ZZH R&B PEDS

## 2018-11-28 PROCEDURE — 25000125 ZZHC RX 250: Performed by: OBSTETRICS & GYNECOLOGY

## 2018-11-28 RX ORDER — LANOLIN 100 %
OINTMENT (GRAM) TOPICAL
Status: DISCONTINUED | OUTPATIENT
Start: 2018-11-28 | End: 2018-11-30 | Stop reason: HOSPADM

## 2018-11-28 RX ORDER — BISACODYL 10 MG
10 SUPPOSITORY, RECTAL RECTAL DAILY PRN
Status: DISCONTINUED | OUTPATIENT
Start: 2018-11-30 | End: 2018-11-30 | Stop reason: HOSPADM

## 2018-11-28 RX ORDER — ACETAMINOPHEN 325 MG/1
650 TABLET ORAL EVERY 4 HOURS PRN
Status: DISCONTINUED | OUTPATIENT
Start: 2018-11-28 | End: 2018-11-30 | Stop reason: HOSPADM

## 2018-11-28 RX ORDER — OXYTOCIN 10 [USP'U]/ML
10 INJECTION, SOLUTION INTRAMUSCULAR; INTRAVENOUS
Status: DISCONTINUED | OUTPATIENT
Start: 2018-11-28 | End: 2018-11-30 | Stop reason: HOSPADM

## 2018-11-28 RX ORDER — IBUPROFEN 400 MG/1
800 TABLET, FILM COATED ORAL EVERY 6 HOURS PRN
Status: DISCONTINUED | OUTPATIENT
Start: 2018-11-28 | End: 2018-11-30 | Stop reason: HOSPADM

## 2018-11-28 RX ORDER — AMOXICILLIN 250 MG
1 CAPSULE ORAL 2 TIMES DAILY
Status: DISCONTINUED | OUTPATIENT
Start: 2018-11-28 | End: 2018-11-30 | Stop reason: HOSPADM

## 2018-11-28 RX ORDER — AMPICILLIN 2 G/1
2 INJECTION, POWDER, FOR SOLUTION INTRAVENOUS EVERY 6 HOURS
Status: DISCONTINUED | OUTPATIENT
Start: 2018-11-28 | End: 2018-11-29 | Stop reason: CLARIF

## 2018-11-28 RX ORDER — OXYTOCIN/0.9 % SODIUM CHLORIDE 30/500 ML
340 PLASTIC BAG, INJECTION (ML) INTRAVENOUS CONTINUOUS PRN
Status: DISCONTINUED | OUTPATIENT
Start: 2018-11-28 | End: 2018-11-30 | Stop reason: HOSPADM

## 2018-11-28 RX ORDER — ACETAMINOPHEN 325 MG/1
975 TABLET ORAL EVERY 6 HOURS
Status: DISCONTINUED | OUTPATIENT
Start: 2018-11-28 | End: 2018-11-30 | Stop reason: CLARIF

## 2018-11-28 RX ORDER — NALOXONE HYDROCHLORIDE 0.4 MG/ML
.1-.4 INJECTION, SOLUTION INTRAMUSCULAR; INTRAVENOUS; SUBCUTANEOUS
Status: DISCONTINUED | OUTPATIENT
Start: 2018-11-28 | End: 2018-11-30 | Stop reason: HOSPADM

## 2018-11-28 RX ORDER — OXYTOCIN/0.9 % SODIUM CHLORIDE 30/500 ML
100 PLASTIC BAG, INJECTION (ML) INTRAVENOUS CONTINUOUS
Status: DISCONTINUED | OUTPATIENT
Start: 2018-11-28 | End: 2018-11-30 | Stop reason: HOSPADM

## 2018-11-28 RX ORDER — OXYCODONE HYDROCHLORIDE 5 MG/1
5 TABLET ORAL
Status: DISCONTINUED | OUTPATIENT
Start: 2018-11-28 | End: 2018-11-30 | Stop reason: HOSPADM

## 2018-11-28 RX ORDER — AMOXICILLIN 250 MG
2 CAPSULE ORAL 2 TIMES DAILY
Status: DISCONTINUED | OUTPATIENT
Start: 2018-11-28 | End: 2018-11-30 | Stop reason: HOSPADM

## 2018-11-28 RX ORDER — HYDROCORTISONE 2.5 %
CREAM (GRAM) TOPICAL 3 TIMES DAILY PRN
Status: DISCONTINUED | OUTPATIENT
Start: 2018-11-28 | End: 2018-11-30 | Stop reason: HOSPADM

## 2018-11-28 RX ADMIN — OXYTOCIN-SODIUM CHLORIDE 0.9% IV SOLN 30 UNIT/500ML 340 ML/HR: 30-0.9/5 SOLUTION at 01:32

## 2018-11-28 RX ADMIN — AMPICILLIN SODIUM 2 G: 2 INJECTION, POWDER, FOR SOLUTION INTRAMUSCULAR; INTRAVENOUS at 00:13

## 2018-11-28 RX ADMIN — AMPICILLIN SODIUM 2 G: 2 INJECTION, POWDER, FOR SOLUTION INTRAMUSCULAR; INTRAVENOUS at 23:45

## 2018-11-28 RX ADMIN — SENNOSIDES AND DOCUSATE SODIUM 1 TABLET: 8.6; 5 TABLET ORAL at 07:56

## 2018-11-28 RX ADMIN — GENTAMICIN SULFATE 160 MG: 40 INJECTION, SOLUTION INTRAMUSCULAR; INTRAVENOUS at 00:50

## 2018-11-28 RX ADMIN — ACETAMINOPHEN 975 MG: 325 TABLET, FILM COATED ORAL at 17:17

## 2018-11-28 RX ADMIN — ACETAMINOPHEN 975 MG: 325 TABLET, FILM COATED ORAL at 05:00

## 2018-11-28 RX ADMIN — IBUPROFEN 800 MG: 800 TABLET ORAL at 15:55

## 2018-11-28 RX ADMIN — IBUPROFEN 800 MG: 800 TABLET ORAL at 01:57

## 2018-11-28 RX ADMIN — IBUPROFEN 800 MG: 800 TABLET ORAL at 21:26

## 2018-11-28 RX ADMIN — OXYCODONE HYDROCHLORIDE 5 MG: 5 TABLET ORAL at 18:34

## 2018-11-28 RX ADMIN — GENTAMICIN SULFATE 120 MG: 40 INJECTION, SOLUTION INTRAMUSCULAR; INTRAVENOUS at 15:57

## 2018-11-28 RX ADMIN — ACETAMINOPHEN 975 MG: 325 TABLET, FILM COATED ORAL at 11:18

## 2018-11-28 RX ADMIN — ACETAMINOPHEN 975 MG: 325 TABLET, FILM COATED ORAL at 23:39

## 2018-11-28 RX ADMIN — IBUPROFEN 800 MG: 800 TABLET ORAL at 08:52

## 2018-11-28 RX ADMIN — AMPICILLIN SODIUM 2 G: 2 INJECTION, POWDER, FOR SOLUTION INTRAMUSCULAR; INTRAVENOUS at 12:31

## 2018-11-28 RX ADMIN — LIDOCAINE HYDROCHLORIDE 20 ML: 10 INJECTION, SOLUTION EPIDURAL; INFILTRATION; INTRACAUDAL; PERINEURAL at 01:30

## 2018-11-28 RX ADMIN — SENNOSIDES AND DOCUSATE SODIUM 1 TABLET: 8.6; 5 TABLET ORAL at 21:27

## 2018-11-28 RX ADMIN — GENTAMICIN SULFATE 120 MG: 40 INJECTION, SOLUTION INTRAMUSCULAR; INTRAVENOUS at 07:53

## 2018-11-28 RX ADMIN — SENNOSIDES AND DOCUSATE SODIUM 1 TABLET: 8.6; 5 TABLET ORAL at 11:41

## 2018-11-28 RX ADMIN — AMPICILLIN SODIUM 2 G: 2 INJECTION, POWDER, FOR SOLUTION INTRAMUSCULAR; INTRAVENOUS at 18:13

## 2018-11-28 RX ADMIN — AMPICILLIN SODIUM 2 G: 2 INJECTION, POWDER, FOR SOLUTION INTRAMUSCULAR; INTRAVENOUS at 06:35

## 2018-11-28 NOTE — ADDENDUM NOTE
Addendum  created 11/27/18 1943 by Jomar Alonzo MD    Anesthesia Event edited, Procedure Event Log accessed

## 2018-11-28 NOTE — PROVIDER NOTIFICATION
11/27/18 8245   Provider Notification   Provider Name/Title Dr. Manning   Method of Notification At Bedside   Request Evaluate in Person   Notification Reason Maternal Vital Sign Change   Orders received

## 2018-11-28 NOTE — LACTATION NOTE
"This note was copied from a baby's chart.  LC to assist to breast feeding.   Aroused babe easily. Assisted with cross cradle latch. No nipple shield used and babe able to latch. Instructions given to aid in deeper latch using \"C\" hold. Reminder given to push breast toward babe to have more breast tissue available for latch. Due to borderline glucose level opted to use nipple shield. Changed to 24mm nipple shield. Babe latched easily. Encouraged breast compressions to aid in milk transfer. Recommend pumping to aid in bringing in milk. Bedside RN in during visit and updated.  Recommendations:  Cross cradle position  \"C\" hold for assisting latch   Use 24mm nipple shield  Ana to pump after breast feeding  "

## 2018-11-28 NOTE — ANESTHESIA POSTPROCEDURE EVALUATION
Patient: Ana Mauricio    * No procedures listed *    Diagnosis:* No pre-op diagnosis entered *  Diagnosis Additional Information: labor    Anesthesia Type:  Epidural    Note:  Anesthesia Post Evaluation         Comments:     S/P epidural for labor.   I or my partner was immediately available for management of this patient during epidural analgesia infusion.  VSS.  Doing well. Block resolved.  Neuro at baseline. Denies positional headache. Minimal side effects easily managed w/ PRN meds. No apparent anesthetic complications. No follow-up required.    Noman Salomon MD        Last vitals:  Vitals:    11/28/18 0319 11/28/18 0359 11/28/18 0752   BP: 104/56 118/72 113/69   Pulse:  98    Resp:  18 16   Temp:  98.1  F (36.7  C) 98.1  F (36.7  C)   SpO2:  97% 98%         Electronically Signed By: Noman Salomon MD  November 28, 2018  9:27 AM

## 2018-11-28 NOTE — PROVIDER NOTIFICATION
11/27/18 2110   Provider Notification   Provider Name/Title Dr. Alonzo   Method of Notification At Bedside     Epidural rebolus

## 2018-11-28 NOTE — PROVIDER NOTIFICATION
18 0055   Provider Notification   Provider Name/Title Dr. Manning    Method of Notification At Bedside   Pushing progress evaluated.  Discussion and decision for assisted delivery.  Room set up and  team notified

## 2018-11-28 NOTE — PROGRESS NOTES
Ana Mauricio is a 23 year old-year-old [place]  female,  1 para 0 with LMP 18 and EDC 18, who was admitted for early labor at 39 weeks gestation.    Her prenatal care was at the [facility] in [place] . Prenatal course was complicated by depression and anxiety. Vaginal Group B Streptococcus culture was negative.  Patient underwent artificial rupture of membranes at 1015, yielding clear fluid.  Her estimated fetal weight was 3600 gms.  Oxytocin augmentation was initiated per standard protocol for inadequate labor.  Patient received an epidural injection for pain relief.  The patient achieved complete dilation at 2125.  She began pushing at 2230. She went on to deliver a 8 #, 2 oz female infant at 0116 by VEVD. Apgars were  7 at one minute and 8 at five minutes. The fetal oropharynx was bulb suctioned on the perineum.  There was no nuchal cord. The placenta delivered spontaneously and intact at 0123.  The patient had a midline 2nd degree laceration. This was repaired with 2-0 vicryl and 3-0 monocryl.  QBL for the delivery was 510cc.  Duration of the first stage of labor was 11 hrs and 9 minutes, second stage 3 hrs and 51 minutes, and third stage 7 minutes.  The patient has elected to Breastfeed her infant.  Dr. Axel Manning

## 2018-11-28 NOTE — PROGRESS NOTES
OB PN    Complete and pushing effectively.  Temp now 101.5 with fetal tachycardia.      Chorioamnionitis criteria present.  Begin Amp/Gent IV.  Continue maternal expulsive efforts  Anticipate

## 2018-11-28 NOTE — PLAN OF CARE
Data: Ana Mauricio transferred to 249 via wheelchair at 0350. Baby transferred via crib.  Action: Receiving unit notified of transfer: Yes. Patient and family notified of room change. Report given to Bre TORRES at 0350. Belongings sent to receiving unit. Accompanied by Registered Nurse. Oriented patient to surroundings. Call light within reach. ID bands double-checked with receiving RN.  Response: Patient tolerated transfer and is stable.

## 2018-11-28 NOTE — PHARMACY-ADMISSION MEDICATION HISTORY
Admission medication history interview status for this patient is complete. See [facility] admission navigator for allergy information, prior to admission medications and immunization status.     Patient transfer from Labor and Delivery    Prior to Admission medications    Medication Sig Last Dose Taking? Auth Provider   ferrous sulfate (IRON) 325 (65 Fe) MG tablet Take 1 tablet (325 mg) by mouth daily (with breakfast) 11/25/2018 at Unknown time Yes Ashley Hernandez, DO   Pediatric Multiple Vitamins (CHEWABLE MULTIPLE VITAMINS OR)  11/25/2018 at Unknown time Yes Reported, Patient   ranitidine (ZANTAC) 300 MG tablet Take 1 tablet (300 mg) by mouth At Bedtime 11/25/2018 at Unknown time Yes Ashley Hernandez,    Prenatal MV-Min-Fe Fum-FA-DHA (PRENATAL 1 PO) Take 1 tablet by mouth Unknown at Unknown time  Reported, Patient

## 2018-11-28 NOTE — PLAN OF CARE
Problem: Patient Care Overview  Goal: Plan of Care/Patient Progress Review  Outcome: Improving  Vital Signs: Afebrile.   Pain/Comfort: Controlled with oral pain medication.   Assessment: Dysuria with urination; using bijan bottle with urination for comfort. Perineum swollen; using ice.   Diet: Regular.   Output: Intact.   Activity/Ambulation: Up independently in room.   Social: Spouse and family at the bedside and supportive. Breastfeeding  with minimal assist q 2-3 hours. Bonding appropriately with .   Plan: Pain control. Encourage fluids. IV antibiotics. Education. Pumping following feeds.

## 2018-11-28 NOTE — PLAN OF CARE
Problem: Patient Care Overview  Goal: Plan of Care/Patient Progress Review  Outcome: No Change  Vital Signs: Stable. Afebrile.   Pain/Comfort: Rating perineum/tailbone pain 8/10, tylenol given as scheduled. Ibuprofen given before arrival to the floor. Candice ice pack in place.   Assessment: Fundus firm, small amount of rubra bleeding. Breastfeeding going well, baby has good latch. Lactation to see.   Diet: Tolerated saltines, water. No nausea.   Output: Due to void.   Activity/Ambulation: Scooted from wheelchair to bed with assist of one.   Social: Father of baby and grandma at bedside.   Plan: Continue IV antibiotics, pain control, education. Will continue to monitor and provide for needs.

## 2018-11-29 PROCEDURE — 25000128 H RX IP 250 OP 636: Performed by: OBSTETRICS & GYNECOLOGY

## 2018-11-29 PROCEDURE — 25000132 ZZH RX MED GY IP 250 OP 250 PS 637: Performed by: OBSTETRICS & GYNECOLOGY

## 2018-11-29 PROCEDURE — 12000013 ZZH R&B PEDS

## 2018-11-29 RX ADMIN — GENTAMICIN SULFATE 120 MG: 40 INJECTION, SOLUTION INTRAMUSCULAR; INTRAVENOUS at 00:06

## 2018-11-29 RX ADMIN — ACETAMINOPHEN 975 MG: 325 TABLET, FILM COATED ORAL at 20:58

## 2018-11-29 RX ADMIN — IBUPROFEN 800 MG: 800 TABLET ORAL at 23:31

## 2018-11-29 RX ADMIN — IBUPROFEN 800 MG: 800 TABLET ORAL at 10:50

## 2018-11-29 RX ADMIN — SENNOSIDES AND DOCUSATE SODIUM 2 TABLET: 8.6; 5 TABLET ORAL at 10:53

## 2018-11-29 RX ADMIN — IBUPROFEN 800 MG: 800 TABLET ORAL at 04:51

## 2018-11-29 RX ADMIN — SENNOSIDES AND DOCUSATE SODIUM 2 TABLET: 8.6; 5 TABLET ORAL at 20:58

## 2018-11-29 RX ADMIN — ACETAMINOPHEN 975 MG: 325 TABLET, FILM COATED ORAL at 14:05

## 2018-11-29 RX ADMIN — IBUPROFEN 800 MG: 800 TABLET ORAL at 17:05

## 2018-11-29 RX ADMIN — ACETAMINOPHEN 975 MG: 325 TABLET, FILM COATED ORAL at 08:22

## 2018-11-29 NOTE — PROGRESS NOTES
[facility] Obstetrics Post-Partum Progress Note          Assessment and Plan:    Assessment:   Post-partum day #0, delivered just after midnight  Normal spontaneous vaginal delivery  L&D complications: None      Doing well.      Plan:   Anticipate discharge in 2 days           Interval History:   Doing well.  Pain is well-controlled.  No fevers.  No history of foul-smelling vaginal discharge.  Good appetite.  Denies chest pain, shortness of breath, nausea or vomiting.  Vaginal bleeding is similar to a heavy menstrual flow.  Ambulatory.  Breastfeeding well.          Significant Problems:    None          Review of Systems:    The patient denies any chest pain, shortness of breath, excessive pain, fever, chills, purulent drainage from the wound, nausea or vomiting.          Medications:   All medications related to the patient's surgery have been reviewed          Physical Exam:     All vitals stable  Patient Vitals for the past 12 hrs:   BP Temp Temp src Pulse Resp SpO2   11/28/18 1834 109/66 97.5  F (36.4  C) Oral 77 18 -   11/28/18 1119 116/61 97.8  F (36.6  C) Oral 80 16 100 %     Uterine fundus is firm, non-tender and at the level of the umbilicus          Data:     All laboratory data related to this surgery reviewed  Hemoglobin   Date Value Ref Range Status   11/27/2018 11.4 (L) 11.7 - 15.7 g/dL Final   09/11/2018 9.5 (L) 11.7 - 15.7 g/dL Final   04/12/2018 11.7 11.7 - 15.7 g/dL Final   12/16/2017 12.6 11.7 - 15.7 g/dL Final   08/22/2016 10.5 (L) 11.7 - 15.7 g/dL Final     Comment:     Results confirmed by repeat test     No imaging studies have been ordered    Jomar Villalobos MD, MD

## 2018-11-29 NOTE — PLAN OF CARE
Problem: Postpartum (Vaginal Delivery) (Adult,Obstetrics,Pediatric)  Goal: Signs and Symptoms of Listed Potential Problems Will be Absent, Minimized or Managed (Postpartum)  Signs and symptoms of listed potential problems will be absent, minimized or managed by discharge/transition of care (reference Postpartum (Vaginal Delivery) (Adult,Obstetrics,Pediatric) CPG).   Outcome: Improving  Vital Signs: VSS. Afebrile  Pain/Comfort: Having a lot of bijan pain at laceration. Using tylenol, motrin and ice packs, without good relief.  Notified MD, and started on oxycodone PRN. Pt reports she's a bit more comfortable.  Assessment: WDL. Fundus firm.  Small amount of bleeding.   Diet: Tolerating regular diet.  Output: Voiding well.  Activity/Ambulation: Up in room independently.  Social: Bonding well with infant.  at bedside this evening. Multiple visitors this afternoon and evening.  Plan: Continue to support breastfeeding. Work on pain control.

## 2018-11-29 NOTE — PLAN OF CARE
Problem: Patient Care Overview  Goal: Plan of Care/Patient Progress Review  Outcome: Improving  Vital Signs: Afebrile.   Pain/Comfort: Controlled with oral pain medication.   Assessment: Mild dysuria; improving. Perineum swollen; using ice. Nipples tender; ensure good latch and using nipple cream.   Diet: Regular.   Output: Intact.   Activity/Ambulation: Up independently.  Social: Father and Grandmother at the bedside today and supportive. Breastfeeding  with minimal assist.   Plan: Pain control. Encourage fluids. Education.

## 2018-11-29 NOTE — PLAN OF CARE
Problem: Patient Care Overview  Goal: Plan of Care/Patient Progress Review  Outcome: Improving  Vital Signs: Stable. Afebrile.   Pain/Comfort: Rating perineum pain 5, receiving tylenol and ibuprofen PRN. Ice pack applied.   Assessment: Fundus firm, at midline. Patient reported small clot with void. BF going fair, continue to work on latch. Trying nipple shield. Baby falling asleep quickly this shift.   Diet: Regular.   Output: Voiding well.   Activity/Ambulation: Independent in room.  Social:  at bedside, supportive of patient.    Plan: Done with antibiotics. IV removed. Continue with education.

## 2018-11-30 VITALS
HEIGHT: 67 IN | RESPIRATION RATE: 16 BRPM | OXYGEN SATURATION: 99 % | HEART RATE: 77 BPM | BODY MASS INDEX: 37.04 KG/M2 | SYSTOLIC BLOOD PRESSURE: 117 MMHG | WEIGHT: 236 LBS | TEMPERATURE: 97.4 F | DIASTOLIC BLOOD PRESSURE: 68 MMHG

## 2018-11-30 PROCEDURE — 25000132 ZZH RX MED GY IP 250 OP 250 PS 637: Performed by: OBSTETRICS & GYNECOLOGY

## 2018-11-30 RX ADMIN — IBUPROFEN 800 MG: 800 TABLET ORAL at 09:11

## 2018-11-30 RX ADMIN — ACETAMINOPHEN 975 MG: 325 TABLET, FILM COATED ORAL at 05:53

## 2018-11-30 RX ADMIN — SENNOSIDES AND DOCUSATE SODIUM 2 TABLET: 8.6; 5 TABLET ORAL at 09:12

## 2018-11-30 NOTE — DISCHARGE SUMMARY
[facility]    Discharge Summary  Obstetrics    Date of Admission:  2018  Date of Discharge:  2018  Discharging Provider: Marlen Orellana    Discharge Diagnoses   Chorioamnionitis in labor (III, confirmed)  Vacuum-assisted vaginal delivery    History of Present Illness   Ana Mauricio is a 23 year old female who presented with labor, underwent vacuum-assisted vaginal delivery after developing chorioamnionitis which was adequately treated.    Hospital Course   The patient's hospital course was unremarkable after delivery.  She recovered as anticipated and experienced no post-delivery complications. She was afebrile following delivery.  On discharge, her pain was well controlled. Vaginal bleeding is similar to peak menstrual flow.  Voiding without difficulty.  Ambulating well and tolerating a normal diet.  No fevers.  Breastfeeding well.  Infant is stable.  She was discharged on post-partum day #2.    Post-partum hemoglobin:   Hemoglobin   Date Value Ref Range Status   2018 11.4 (L) 11.7 - 15.7 g/dL Final       Marlen Orellana MD    Discharge Disposition   Discharged to home   Condition at discharge: Stable    Primary Care Physician   Susan Haase    Consultations This Hospital Stay   ANESTHESIOLOGY IP CONSULT  HOME CARE POST PARTUM/ IP CONSULT  LACTATION IP CONSULT    Discharge Orders     Activity   Review discharge instructions     Reason for your hospital stay   Maternity care     Discharge Instructions - Postpartum visit   Schedule postpartum visit with your provider and return to clinic in 6 weeks.     Diet   Resume previous diet       Discharge Medications   Current Discharge Medication List      CONTINUE these medications which have NOT CHANGED    Details   ferrous sulfate (IRON) 325 (65 Fe) MG tablet Take 1 tablet (325 mg) by mouth daily (with breakfast)  Qty: 30 tablet, Refills: 2    Associated Diagnoses: Other iron deficiency anemia      Pediatric Multiple Vitamins  (CHEWABLE MULTIPLE VITAMINS OR)     Associated Diagnoses: Supervision of normal first pregnancy, antepartum      ranitidine (ZANTAC) 300 MG tablet Take 1 tablet (300 mg) by mouth At Bedtime  Qty: 90 tablet, Refills: 1    Associated Diagnoses: Gastroesophageal reflux disease without esophagitis      Prenatal MV-Min-Fe Fum-FA-DHA (PRENATAL 1 PO) Take 1 tablet by mouth           Allergies   Allergies   Allergen Reactions     Pork Allergy      Protonix [Pantoprazole] Hives     Strattera [Atomoxetine Hydrochloride]      palpitations

## 2018-11-30 NOTE — PLAN OF CARE
Problem: Patient Care Overview  Goal: Plan of Care/Patient Progress Review  Outcome: Improving  Afebrile. VSS. Pain in lower abdomen described as cramping and pain in perineum controlled with Motrin and Tylenol. Fundus firm midline U/U. Small lochia. 3rd degree lac approximated, swollen perineum. Tolerating regular diet. Ambulating independently. Voiding. No stool. Appeared to sleep comfortably between cares. Bonding well with baby. FOB at bedside encouraged to change next diaper, holding baby and bonding.

## 2018-11-30 NOTE — DISCHARGE INSTRUCTIONS
Postpartum pain control:    You will likely experience cramping for 1-2 weeks.   You can take up to 3 tabs of ibuprofen (600mg) every 6 hours as needed for pain.  You can additionally take 1-2 tabs of tylenol (325-650mg regular strength 500-1000mg extra strength), every 6 hours for additional pain control as needed.  It is best to alternate your medications so you are taking something every 3 hours if you are having continued pain.    If you have vaginal pain you can take sitz baths 1-2x/day. Fill the tub with 2-3 inches of warm water and soak the perineal and vaginal area for 10 minutes. Ice or warm packs can also be applied for comfort.    Please call the office for:  Temperature above 100.4  Severe headache, especially with changes in your vision  Heavy bleeding (more than one pad an hour for more than 2 hours in a row)  Any other new, concerning symptoms.    Constipation  You may use the following products to ease constipation:    1. Stool softeners such as metamucil or benefiber  2. senna 1-2 times daily  3. Fiber supplements  4. miralax (over the counter).  5. Dulcolax    Please be sure to keep adequately hydrated; 6-8 8oz glasses daily, more if needed to compensate for exercise, sweating, etc.      More specific dosing can be found below:    - Metamucil 28g daily PO with 8oz of water  - senna-docusate 8.6-50 MG per tablet PO 1 tablet, Oral, 2 TIMES DAILY, Start with 1 tablet PO BID, reduce to 1 tablet daily when having daily BMs. Stop for loose stools.  - docusate sodium (COLACE) capsule 100 mg, Oral, 2 TIMES DAILY, To prevent constipation. Hold for loose stools.  - bisacodyl (DULCOLAX) suppository 10 mg, Rectal, DAILY PRN, constipation, Hold for loose stools.       Please contact the clinic with any questions.  Please schedule a follow up appointment with your primary OB/Gyn provider in 6 weeks.   You can respond with [place] or call Mary at [phone].      Postpartum Vaginal Delivery  Instructions    Activity       Ask family and friends for help when you need it.    Do not place anything in your vagina for 6 weeks.    You are not restricted on other activities, but take it easy for a few weeks to allow your body to recover from delivery.  You are able to do any activities you feel up to that point.    No driving until you have stopped taking your pain medications (usually two weeks after delivery).     Call your health care provider if you have any of these symptoms:       Increased pain, swelling, redness, or fluid around your stiches from an episiotomy or perineal tear.    A fever above 100.4 F (38 C) with or without chills when placing a thermometer under your tongue.    You soak a sanitary pad with blood within 1 hour, or you see blood clots larger than a golf ball.    Bleeding that lasts more than 6 weeks.    Vaginal discharge that smells bad.    Severe pain, cramping or tenderness in your lower belly area.    A need to urinate more frequently (use the toilet more often), more urgently (use the toilet very quickly), or it burns when you urinate.    Nausea and vomiting.    Redness, swelling or pain around a vein in your leg.    Problems breastfeeding or a red or painful area on your breast.    Chest pain and cough or are gasping for air.    Problems coping with sadness, anxiety, or depression.  If you have any concerns about hurting yourself or the baby, call your provider immediately.     You have questions or concerns after you return home.     Keep your hands clean:  Always wash your hands before touching your perineal area and stitches.  This helps reduce your risk of infection.  If your hands aren't dirty, you may use an alcohol hand-rub to clean your hands. Keep your nails clean and short.

## 2018-11-30 NOTE — PLAN OF CARE
Problem: Postpartum (Vaginal Delivery) (Adult,Obstetrics,Pediatric)  Goal: Signs and Symptoms of Listed Potential Problems Will be Absent, Minimized or Managed (Postpartum)  Signs and symptoms of listed potential problems will be absent, minimized or managed by discharge/transition of care (reference Postpartum (Vaginal Delivery) (Adult,Obstetrics,Pediatric) CPG).   Outcome: Adequate for Discharge Date Met: 11/30/18  VSS.  Pain controlled with po ibuprofen and cold packs to perineum.  Tolerating diet.  Fundas firm and U/1.  Scant bijan drainage; denies clots.  Voiding.  Stool softener given.  Up independently in room.   and mother present in room and supportive.  Breastfeeding independently; Was given resources and discharge and instructed on home care referral.  Discharged instructions given; all questions answered.  Aware of follow up recommendations.

## 2018-11-30 NOTE — PROGRESS NOTES
[facility]   Post-Partum Progress Note          Assessment and Plan:    Assessment:   Post-partum day #1  Normal spontaneous vaginal delivery  L&D complications: None      Doing well.  Pain well-controlled.      Plan:   Ambulation encouraged  Anticipate discharge tomorrow           Interval History:   Doing well.  Pain is well-controlled.  No fevers.  No history of foul-smelling vaginal discharge.  Good appetite.  Denies chest pain, shortness of breath, nausea or vomiting.  Vaginal bleeding is similar to a heavy menstrual flow.  Ambulatory.  Breastfeeding well.          Significant Problems:    None          Review of Systems:    CONSTITUTIONAL: NEGATIVE for fever, chills, change in weight  INTEGUMENTARY/SKIN: NEGATIVE for worrisome rashes, moles or lesions  EYES: NEGATIVE for vision changes or irritation  ENT/MOUTH: NEGATIVE for ear, mouth and throat problems  RESP: NEGATIVE for significant cough or SOB  BREAST: NEGATIVE for masses, tenderness or discharge  CV: NEGATIVE for chest pain, palpitations or peripheral edema  GI: NEGATIVE for nausea, abdominal pain, heartburn, or change in bowel habits  : NEGATIVE for frequency, dysuria, or hematuria  MUSCULOSKELETAL: NEGATIVE for significant arthralgias or myalgia  NEURO: NEGATIVE for weakness, dizziness or paresthesias  ENDOCRINE: NEGATIVE for temperature intolerance, skin/hair changes  HEME: NEGATIVE for bleeding problems  PSYCHIATRIC: NEGATIVE for changes in mood or affect          Medications:   All medications related to the patient's surgery have been reviewed  -          Physical Exam:     All vitals stable  Patient Vitals for the past 8 hrs:   BP Temp Heart Rate Resp   11/29/18 1700 107/66 98.2  F (36.8  C) 77 20     Uterine fundus is firm, non-tender and at the level of the umbilicus          Data:     All laboratory data related to this surgery reviewed  Hemoglobin   Date Value Ref Range Status   11/27/2018 11.4 (L) 11.7 - 15.7 g/dL Final    09/11/2018 9.5 (L) 11.7 - 15.7 g/dL Final   04/12/2018 11.7 11.7 - 15.7 g/dL Final   12/16/2017 12.6 11.7 - 15.7 g/dL Final   08/22/2016 10.5 (L) 11.7 - 15.7 g/dL Final     Comment:     Results confirmed by repeat test     -    Ashley Hernandez DO

## 2018-11-30 NOTE — PLAN OF CARE
Problem: Patient Care Overview  Goal: Plan of Care/Patient Progress Review  Outcome: Improving  Comfortable on ibu and tyl.  Up independently.  Nipple cream being applied to tender nipples.  Interacting with with infant and attentive to needs.  Cont with plan of care.

## 2018-12-01 ENCOUNTER — DOCUMENTATION ONLY (OUTPATIENT)
Dept: CARE COORDINATION | Facility: CLINIC | Age: 23
End: 2018-12-01

## 2018-12-01 NOTE — PROGRESS NOTES
[facility] will be sharing updates with you on Maternal Child Health Referral requests for home care services.  This is for care coordination purposes and alert you to referral status.  We received the referral for  Ana Mauricio; MRN [id number] and want to update you:    Home visit for postpartum/  assessment and education offered to patient.  Patient declined need for home care visit.  Advised to follow up with Primary Care Providers for mom and baby.    Sincerely [facility]  Carlos Taylorg  [phone]

## 2019-01-09 ENCOUNTER — PRENATAL OFFICE VISIT (OUTPATIENT)
Dept: OBGYN | Facility: CLINIC | Age: 24
End: 2019-01-09
Payer: COMMERCIAL

## 2019-01-09 VITALS
HEIGHT: 67 IN | DIASTOLIC BLOOD PRESSURE: 80 MMHG | WEIGHT: 222 LBS | SYSTOLIC BLOOD PRESSURE: 124 MMHG | BODY MASS INDEX: 34.84 KG/M2

## 2019-01-09 DIAGNOSIS — Z30.011 ENCOUNTER FOR INITIAL PRESCRIPTION OF CONTRACEPTIVE PILLS: Primary | ICD-10-CM

## 2019-01-09 PROBLEM — Z34.00 SUPERVISION OF NORMAL FIRST PREGNANCY, ANTEPARTUM: Status: RESOLVED | Noted: 2018-06-07 | Resolved: 2019-01-09

## 2019-01-09 LAB — HGB BLD-MCNC: 11.8 G/DL (ref 11.7–15.7)

## 2019-01-09 PROCEDURE — 99207 ZZC POST PARTUM EXAM: CPT | Performed by: FAMILY MEDICINE

## 2019-01-09 PROCEDURE — 36415 COLL VENOUS BLD VENIPUNCTURE: CPT | Performed by: FAMILY MEDICINE

## 2019-01-09 PROCEDURE — 85018 HEMOGLOBIN: CPT | Performed by: FAMILY MEDICINE

## 2019-01-09 RX ORDER — ACETAMINOPHEN AND CODEINE PHOSPHATE 120; 12 MG/5ML; MG/5ML
0.35 SOLUTION ORAL DAILY
Qty: 84 TABLET | Refills: 4 | Status: SHIPPED | OUTPATIENT
Start: 2019-01-09 | End: 2019-07-31

## 2019-01-09 ASSESSMENT — MIFFLIN-ST. JEOR: SCORE: 1799.62

## 2019-01-09 ASSESSMENT — PATIENT HEALTH QUESTIONNAIRE - PHQ9: SUM OF ALL RESPONSES TO PHQ QUESTIONS 1-9: 2

## 2019-01-09 NOTE — NURSING NOTE
"Chief Complaint   Patient presents with     Post Partum Exam     vaginal delivery 18--pap not due--pt had stitches and want to make sure they ok--pt is breastfeeding--would like birth control pills        Initial /80 (BP Location: Right arm, Patient Position: Sitting, Cuff Size: Adult Regular)   Ht 1.702 m (5' 7\")   Wt 100.7 kg (222 lb)   LMP 2018   BMI 34.77 kg/m   Estimated body mass index is 34.77 kg/m  as calculated from the following:    Height as of this encounter: 1.702 m (5' 7\").    Weight as of this encounter: 100.7 kg (222 lb).  BP completed using cuff size: regular    Questioned patient about current smoking habits.  Pt. has never smoked.          The following HM Due: NONE         "

## 2019-01-09 NOTE — PROGRESS NOTES
SUBJECTIVE: Ana is here for a 6-week postpartum checkup.    Delivery date was 18. She had a  of a viable girl, weight 8 pounds 2 oz., with no complications.  Since delivery, she has been breast feeding.  She has no signs of infection, bleeding or other complications.  She is not pregnant.  We discussed contraceptions and she has chosen mini pill / progesterone only pill.  She  has not had intercourse since delivery and complains of No discomfort. Patient screened for postpartum depression and complaints are NEGATIVE. Screening has also been completed for intimate partner violence.        This document serves as a record of the services and decisions personally performed and made by Ashley Hernandez DO. It was created on her behalf by Yasmine An, a trained medical scribe. The creation of this document is based the provider's statements to the medical scribe.  Scribe Yasmine An 3:46 PM, 2019    EXAM:  Today's Depression Rating was 2  PHQ-9 SCORE 2019   PHQ-9 Total Score -   PHQ-9 Total Score 2     GENERAL healthy, alert and no distress  EYES EOMI, intact visual fields, PERRL and funduscopic deferred  HENT: Normocephalic. TM's grossly normal, oropharynx without significant findings.  NECK: no adenopathy, no asymmetry, masses, or scars and thyroid normal to palpation  RESP: Clear to auscultation  BREASTS: NEGATIVE  CV: NEGATIVE  LYMPH: NEGATIVE  GI: aorta normal and bowel sounds normal  : no hernia detected  GYN PELVIC: NEGATIVE, normal external genitalia, normal groin lymphatics, normal urethral meatus, normal vaginal mucosa, normal cervix and normal adnexa, no masses or tenderness  MS: NEGATIVE, Extremities normal. No deformaties, edema or skin discoloration.  SKIN: no ulcers, lesions or rash  NEURO: alert/oriented to person, location and time, CN 2-12 intact, brisk, symmetric reflexes at knees and biceps b/l, strength 5/5 throughout and symmetric, sensation to light touch grossly intact  throughout  PSYCH: NEGATIVE    Discussed risks and benefits of contraception options in detail including oral contraceptive pills, injection, IUD, ring and sterilization. Patient elects progesterone-only pill.  No contraindications noted.           ASSESSMENT:   Normal postpartum exam after .    PLAN:  1. Hemoglobin pending  2. PPBC: Progesterone-only pill, will switch to combination once she is done breast-feeding   - Advised to use condoms for the first week on the pill & if she has pain with intercourse, use lubrication with  intercourse  3. Return as needed or at time of next expected pap, pelvic, or breast exam.      Rx:  - Micronor 0.35 mg 1 tab po daily        The information in this document, created by the medical scribe for me, accurately reflects the services I personally performed and the decisions made by me. I have reviewed and approved this document for accuracy prior to leaving the patient care area.  3:46 PM, 19    Dr. Ashley Hernandez, DO    OB/GYN   [facility]

## 2019-01-09 NOTE — PATIENT INSTRUCTIONS
Return yearly     Hemoglobin today     Dr. Ashley Hernandez, DO    Obstetrics and Gynecology  [facility] [place] [facility] [place]

## 2019-07-31 ENCOUNTER — OFFICE VISIT (OUTPATIENT)
Dept: FAMILY MEDICINE | Facility: CLINIC | Age: 24
End: 2019-07-31
Payer: COMMERCIAL

## 2019-07-31 VITALS
DIASTOLIC BLOOD PRESSURE: 71 MMHG | HEIGHT: 67 IN | SYSTOLIC BLOOD PRESSURE: 114 MMHG | BODY MASS INDEX: 35.12 KG/M2 | HEART RATE: 75 BPM | WEIGHT: 223.8 LBS | TEMPERATURE: 98 F | RESPIRATION RATE: 12 BRPM

## 2019-07-31 DIAGNOSIS — E66.812 CLASS 2 OBESITY DUE TO EXCESS CALORIES WITHOUT SERIOUS COMORBIDITY WITH BODY MASS INDEX (BMI) OF 35.0 TO 35.9 IN ADULT: ICD-10-CM

## 2019-07-31 DIAGNOSIS — E66.09 CLASS 2 OBESITY DUE TO EXCESS CALORIES WITHOUT SERIOUS COMORBIDITY WITH BODY MASS INDEX (BMI) OF 35.0 TO 35.9 IN ADULT: ICD-10-CM

## 2019-07-31 DIAGNOSIS — Z00.00 ROUTINE GENERAL MEDICAL EXAMINATION AT A HEALTH CARE FACILITY: Primary | ICD-10-CM

## 2019-07-31 LAB
CHOLEST SERPL-MCNC: 197 MG/DL
GLUCOSE SERPL-MCNC: 97 MG/DL (ref 70–99)
HDLC SERPL-MCNC: 37 MG/DL
LDLC SERPL CALC-MCNC: 138 MG/DL
NONHDLC SERPL-MCNC: 160 MG/DL
TRIGL SERPL-MCNC: 111 MG/DL
TSH SERPL DL<=0.005 MIU/L-ACNC: 2.41 MU/L (ref 0.4–4)

## 2019-07-31 PROCEDURE — 84443 ASSAY THYROID STIM HORMONE: CPT | Performed by: PHYSICIAN ASSISTANT

## 2019-07-31 PROCEDURE — 99395 PREV VISIT EST AGE 18-39: CPT | Performed by: PHYSICIAN ASSISTANT

## 2019-07-31 PROCEDURE — 82947 ASSAY GLUCOSE BLOOD QUANT: CPT | Performed by: PHYSICIAN ASSISTANT

## 2019-07-31 PROCEDURE — 80061 LIPID PANEL: CPT | Performed by: PHYSICIAN ASSISTANT

## 2019-07-31 PROCEDURE — 36415 COLL VENOUS BLD VENIPUNCTURE: CPT | Performed by: PHYSICIAN ASSISTANT

## 2019-07-31 SDOH — HEALTH STABILITY: PHYSICAL HEALTH: ON AVERAGE, HOW MANY MINUTES DO YOU ENGAGE IN EXERCISE AT THIS LEVEL?: 80 MIN

## 2019-07-31 SDOH — ECONOMIC STABILITY: TRANSPORTATION INSECURITY
IN THE PAST 12 MONTHS, HAS THE LACK OF TRANSPORTATION KEPT YOU FROM MEDICAL APPOINTMENTS OR FROM GETTING MEDICATIONS?: NO

## 2019-07-31 SDOH — SOCIAL STABILITY: SOCIAL NETWORK: HOW OFTEN DO YOU ATTEND MEETINGS OF THE CLUBS OR ORGANIZATIONS YOU BELONG TO?: NEVER

## 2019-07-31 SDOH — ECONOMIC STABILITY: FOOD INSECURITY: WITHIN THE PAST 12 MONTHS, THE FOOD YOU BOUGHT JUST DIDN'T LAST AND YOU DIDN'T HAVE MONEY TO GET MORE.: NEVER TRUE

## 2019-07-31 SDOH — SOCIAL STABILITY: SOCIAL NETWORK: ARE YOU MARRIED, WIDOWED, DIVORCED, SEPARATED, NEVER MARRIED, OR LIVING WITH A PARTNER?: MARRIED

## 2019-07-31 SDOH — HEALTH STABILITY: PHYSICAL HEALTH: ON AVERAGE, HOW MANY DAYS PER WEEK DO YOU ENGAGE IN MODERATE TO STRENUOUS EXERCISE (LIKE A BRISK WALK)?: 4 DAYS

## 2019-07-31 SDOH — HEALTH STABILITY: MENTAL HEALTH: HOW OFTEN DO YOU HAVE A DRINK CONTAINING ALCOHOL?: MONTHLY OR LESS

## 2019-07-31 SDOH — SOCIAL STABILITY: SOCIAL NETWORK: HOW OFTEN DO YOU ATTEND CHURCH OR RELIGIOUS SERVICES?: PATIENT DECLINED

## 2019-07-31 SDOH — SOCIAL STABILITY: SOCIAL NETWORK: HOW OFTEN DO YOU ATTENT MEETINGS OF THE CLUB OR ORGANIZATION YOU BELONG TO?: NEVER

## 2019-07-31 SDOH — SOCIAL STABILITY: SOCIAL NETWORK
DO YOU BELONG TO ANY CLUBS OR ORGANIZATIONS SUCH AS CHURCH GROUPS UNIONS, FRATERNAL OR ATHLETIC GROUPS, OR SCHOOL GROUPS?: NO

## 2019-07-31 SDOH — ECONOMIC STABILITY: FOOD INSECURITY: WITHIN THE PAST 12 MONTHS, YOU WORRIED THAT YOUR FOOD WOULD RUN OUT BEFORE YOU GOT THE MONEY TO BUY MORE.: NEVER TRUE

## 2019-07-31 SDOH — HEALTH STABILITY: MENTAL HEALTH
STRESS IS WHEN SOMEONE FEELS TENSE, NERVOUS, ANXIOUS, OR CAN'T SLEEP AT NIGHT BECAUSE THEIR MIND IS TROUBLED. HOW STRESSED ARE YOU?: NOT AT ALL

## 2019-07-31 SDOH — SOCIAL STABILITY: SOCIAL NETWORK
IN A TYPICAL WEEK, HOW MANY TIMES DO YOU TALK ON THE PHONE WITH FAMILY, FRIENDS, OR NEIGHBORS?: MORE THAN THREE TIMES A WEEK

## 2019-07-31 SDOH — ECONOMIC STABILITY: FOOD INSECURITY: HOW HARD IS IT FOR YOU TO PAY FOR THE VERY BASICS LIKE FOOD, HOUSING, MEDICAL CARE, AND HEATING?: NOT HARD AT ALL

## 2019-07-31 SDOH — SOCIAL STABILITY: SOCIAL NETWORK
DO YOU BELONG TO ANY CLUBS OR ORGANIZATIONS SUCH AS CHURCH GROUPS, UNIONS, FRATERNAL OR ATHLETIC GROUPS, OR SCHOOL GROUPS?: NO

## 2019-07-31 SDOH — ECONOMIC STABILITY: INCOME INSECURITY: HOW HARD IS IT FOR YOU TO PAY FOR THE VERY BASICS LIKE FOOD, HOUSING, MEDICAL CARE, AND HEATING?: NOT HARD AT ALL

## 2019-07-31 SDOH — SOCIAL STABILITY: SOCIAL NETWORK: HOW OFTEN DO YOU GET TOGETHER WITH FRIENDS OR RELATIVES?: MORE THAN THREE TIMES A WEEK

## 2019-07-31 SDOH — ECONOMIC STABILITY: FOOD INSECURITY: WITHIN THE PAST 12 MONTHS, YOU WORRIED THAT YOUR FOOD WOULD RUN OUT BEFORE YOU GOT MONEY TO BUY MORE.: NEVER TRUE

## 2019-07-31 SDOH — HEALTH STABILITY: MENTAL HEALTH: HOW OFTEN DO YOU HAVE 6 OR MORE DRINKS ON ONE OCCASION?: LESS THAN MONTHLY

## 2019-07-31 SDOH — HEALTH STABILITY: MENTAL HEALTH: HOW OFTEN DO YOU HAVE SIX OR MORE DRINKS ON ONE OCCASION?: LESS THAN MONTHLY

## 2019-07-31 SDOH — ECONOMIC STABILITY: TRANSPORTATION INSECURITY
IN THE PAST 12 MONTHS, HAS LACK OF TRANSPORTATION KEPT YOU FROM MEETINGS, WORK, OR FROM GETTING THINGS NEEDED FOR DAILY LIVING?: NO

## 2019-07-31 SDOH — ECONOMIC STABILITY: TRANSPORTATION INSECURITY: IN THE PAST 12 MONTHS, HAS LACK OF TRANSPORTATION KEPT YOU FROM MEDICAL APPOINTMENTS OR FROM GETTING MEDICATIONS?: NO

## 2019-07-31 SDOH — HEALTH STABILITY: MENTAL HEALTH: HOW MANY DRINKS CONTAINING ALCOHOL DO YOU HAVE ON A TYPICAL DAY WHEN YOU ARE DRINKING?: 1 OR 2

## 2019-07-31 SDOH — HEALTH STABILITY: MENTAL HEALTH: HOW MANY STANDARD DRINKS CONTAINING ALCOHOL DO YOU HAVE ON A TYPICAL DAY?: 1 OR 2

## 2019-07-31 SDOH — SOCIAL STABILITY: SOCIAL INSECURITY: ARE YOU MARRIED, WIDOWED, DIVORCED, SEPARATED, NEVER MARRIED, OR LIVING WITH A PARTNER?: MARRIED

## 2019-07-31 SDOH — HEALTH STABILITY: MENTAL HEALTH
DO YOU FEEL STRESS - TENSE, RESTLESS, NERVOUS, OR ANXIOUS, OR UNABLE TO SLEEP AT NIGHT BECAUSE YOUR MIND IS TROUBLED ALL THE TIME - THESE DAYS?: NOT AT ALL

## 2019-07-31 ASSESSMENT — ENCOUNTER SYMPTOMS
HEMATURIA: 0
SORE THROAT: 0
NERVOUS/ANXIOUS: 0
MYALGIAS: 0
CHILLS: 0
DIARRHEA: 0
HEARTBURN: 0
FREQUENCY: 0
PARESTHESIAS: 0
HEADACHES: 0
FEVER: 0
BREAST MASS: 0
ARTHRALGIAS: 0
ABDOMINAL PAIN: 0
JOINT SWELLING: 0
WEAKNESS: 0
HEMATOCHEZIA: 0
NAUSEA: 0
PALPITATIONS: 0
DYSURIA: 0
DIZZINESS: 0
SHORTNESS OF BREATH: 0
EYE PAIN: 0
COUGH: 0

## 2019-07-31 ASSESSMENT — ANXIETY QUESTIONNAIRES
7. FEELING AFRAID AS IF SOMETHING AWFUL MIGHT HAPPEN: NOT AT ALL
3. WORRYING TOO MUCH ABOUT DIFFERENT THINGS: NOT AT ALL
GAD7 TOTAL SCORE: 1
1. FEELING NERVOUS, ANXIOUS, OR ON EDGE: SEVERAL DAYS
4. TROUBLE RELAXING: NOT AT ALL
2. NOT BEING ABLE TO STOP OR CONTROL WORRYING: NOT AT ALL
GAD7 TOTAL SCORE: 1
5. BEING SO RESTLESS THAT IT IS HARD TO SIT STILL: NOT AT ALL
7. FEELING AFRAID AS IF SOMETHING AWFUL MIGHT HAPPEN: NOT AT ALL
GAD7 TOTAL SCORE: 1
6. BECOMING EASILY ANNOYED OR IRRITABLE: NOT AT ALL

## 2019-07-31 ASSESSMENT — ACTIVITIES OF DAILY LIVING (ADL): LACK_OF_TRANSPORTATION: NO

## 2019-07-31 ASSESSMENT — PATIENT HEALTH QUESTIONNAIRE - PHQ9
10. IF YOU CHECKED OFF ANY PROBLEMS, HOW DIFFICULT HAVE THESE PROBLEMS MADE IT FOR YOU TO DO YOUR WORK, TAKE CARE OF THINGS AT HOME, OR GET ALONG WITH OTHER PEOPLE: NOT DIFFICULT AT ALL
SUM OF ALL RESPONSES TO PHQ QUESTIONS 1-9: 2
SUM OF ALL RESPONSES TO PHQ QUESTIONS 1-9: 2

## 2019-07-31 ASSESSMENT — MIFFLIN-ST. JEOR: SCORE: 1799.84

## 2019-07-31 NOTE — PROGRESS NOTES
SUBJECTIVE:   CC: Ana Mauricio is an 23 year old woman who presents for preventive health visit.     Healthy Habits:     Getting at least 3 servings of Calcium per day:  NO    Bi-annual eye exam:  Yes    Dental care twice a year:  Yes    Sleep apnea or symptoms of sleep apnea:  None    Diet:  Regular (no restrictions)    Frequency of exercise:  4-5 days/week    Duration of exercise:  Greater than 60 minutes    Taking medications regularly:  Not Applicable    Medication side effects:  Not applicable    PHQ-2 Total Score: 0    Additional concerns today:  Yes          Today's PHQ-2 Score:   PHQ-2 ( 1999 Pf[employer]) 7/31/2019   Q1: Little interest or pleasure in doing things 0   Q2: Feeling down, depressed or hopeless 0   PHQ-2 Score 0   Q1: Little interest or pleasure in doing things Not at all   Q2: Feeling down, depressed or hopeless Not at all   PHQ-2 Score 0       Abuse: Current or Past(Physical, Sexual or Emotional)- No  Do you feel safe in your environment? Yes    Social History     Tobacco Use     Smoking status: Never Smoker     Smokeless tobacco: Never Used   Substance Use Topics     Alcohol use: No     Frequency: Monthly or less     Drinks per session: 1 or 2     Binge frequency: Less than monthly         Alcohol Use 7/31/2019   Prescreen: >3 drinks/day or >7 drinks/week? No   Prescreen: >3 drinks/day or >7 drinks/week? -       Reviewed orders with patient.  Reviewed health maintenance and updated orders accordingly - Yes  BP Readings from Last 3 Encounters:   07/31/19 114/71   01/09/19 124/80   11/30/18 117/68    Wt Readings from Last 3 Encounters:   07/31/19 101.5 kg (223 lb 12.8 oz)   01/09/19 100.7 kg (222 lb)   11/26/18 107 kg (236 lb)                  Patient Active Problem List   Diagnosis     Attention deficit hyperactivity disorder (ADHD)     Obesity     Iron deficiency anemia     Anxiety     Eczema     Hyperlipidemia LDL goal <160     GERD (gastroesophageal reflux disease)     Family history of  diabetes mellitus     Vitamin D deficiency disease     Anxiety states     Major depressive disorder, single episode, moderate (H)     Cellulitis and abscess of trunk     Encounter for triage in pregnant patient     Prenatal care, first pregnancy     Indication for care in labor or delivery     Labor and delivery, indication for care     Past Surgical History:   Procedure Laterality Date     NO HISTORY OF SURGERY         Social History     Tobacco Use     Smoking status: Never Smoker     Smokeless tobacco: Never Used   Substance Use Topics     Alcohol use: No     Frequency: Monthly or less     Drinks per session: 1 or 2     Binge frequency: Less than monthly     Family History   Problem Relation Age of Onset     Lipids Mother      Lipids Father      C.A.D. No family hx of          No current outpatient medications on file.     Allergies   Allergen Reactions     Pork Allergy      Protonix [Pantoprazole] Hives     Strattera [Atomoxetine Hydrochloride]      palpitations     Recent Labs   Lab Test 05/08/18  1626 12/16/17  0908 05/02/16  1106 12/30/13  0950   A1C 5.5  --  5.9 5.5   LDL  --  162* 133* 131*   HDL  --  52 45* 43*   TRIG  --  86 115* 72   ALT  --  27 29  --    CR  --  0.66 0.76 0.84   GFRESTIMATED  --  >90 >90  Non  GFR Calc   89   GFRESTBLACK  --  >90 >90   GFR Calc   >90   POTASSIUM  --  3.9 4.2 4.1   TSH  --  2.48 1.93 3.05        Mammogram not appropriate for this patient based on age.    Pertinent mammograms are reviewed under the imaging tab.  History of abnormal Pap smear: NO - age 30- 65 PAP every 3 years recommended  PAP / HPV 12/16/2017   PAP NIL     Reviewed and updated as needed this visit by clinical staff  Tobacco  Allergies  Meds  Problems  Med Hx  Surg Hx  Fam Hx         Reviewed and updated as needed this visit by Provider  Tobacco  Allergies  Meds  Problems  Med Hx  Surg Hx  Fam Hx        Past Medical History:   Diagnosis Date     Anemia       Attention deficit disorder without mention of hyperactivity     not on meds now, tried several     Cellulitis and abscess of trunk 11/10/2017     Hyperlipidemia LDL goal <160 12/31/2011      Past Surgical History:   Procedure Laterality Date     NO HISTORY OF SURGERY         Review of Systems   Constitutional: Negative for chills and fever.   HENT: Negative for congestion, ear pain, hearing loss and sore throat.    Eyes: Negative for pain and visual disturbance.   Respiratory: Negative for cough and shortness of breath.    Cardiovascular: Negative for chest pain, palpitations and peripheral edema.   Gastrointestinal: Negative for abdominal pain, diarrhea, heartburn, hematochezia and nausea.   Breasts:  Negative for tenderness, breast mass and discharge.   Genitourinary: Negative for dysuria, frequency, genital sores, hematuria, pelvic pain, urgency, vaginal bleeding and vaginal discharge.   Musculoskeletal: Negative for arthralgias, joint swelling and myalgias.   Skin: Negative for rash.   Neurological: Negative for dizziness, weakness, headaches and paresthesias.   Psychiatric/Behavioral: Negative for mood changes. The patient is not nervous/anxious.      CONSTITUTIONAL: NEGATIVE for fever, chills, change in weight  INTEGUMENTARU/SKIN: NEGATIVE for worrisome rashes, moles or lesions  EYES: NEGATIVE for vision changes or irritation  ENT: NEGATIVE for ear, mouth and throat problems  RESP: NEGATIVE for significant cough or SOB  BREAST: NEGATIVE for masses, tenderness or discharge  CV: NEGATIVE for chest pain, palpitations or peripheral edema  GI: NEGATIVE for nausea, abdominal pain, heartburn, or change in bowel habits  : NEGATIVE for unusual urinary or vaginal symptoms. Periods are regular.  MUSCULOSKELETAL: NEGATIVE for significant arthralgias or myalgia  NEURO: NEGATIVE for weakness, dizziness or paresthesias  PSYCHIATRIC: NEGATIVE for changes in mood or affect     OBJECTIVE:   /71 (BP Location: Right arm,  "Patient Position: Chair, Cuff Size: Adult Large)   Pulse 75   Temp 98  F (36.7  C) (Oral)   Resp 12   Ht 1.689 m (5' 6.5\")   Wt 101.5 kg (223 lb 12.8 oz)   LMP 07/07/2019 (Approximate)   Breastfeeding? No   BMI 35.58 kg/m    Physical Exam  GENERAL: healthy, alert and no distress  EYES: Eyes grossly normal to inspection, PERRL and conjunctivae and sclerae normal  HENT: ear canals and TM's normal, nose and mouth without ulcers or lesions  NECK: no adenopathy, no asymmetry, masses, or scars and thyroid normal to palpation  RESP: lungs clear to auscultation - no rales, rhonchi or wheezes  BREAST: deferred by patient.   CV: regular rate and rhythm, normal S1 S2, no S3 or S4, no murmur, click or rub, no peripheral edema and peripheral pulses strong  ABDOMEN: soft, nontender, no hepatosplenomegaly, no masses and bowel sounds normal  MS: no gross musculoskeletal defects noted, no edema  SKIN: no suspicious lesions or rashes  NEURO: Normal strength and tone, mentation intact and speech normal  PSYCH: mentation appears normal, affect normal/bright    Diagnostic Test Results:  none     ASSESSMENT/PLAN:   (Z00.00) Routine general medical examination at a health care facility  (primary encounter diagnosis)  Comment: normal exam with exception of weight below. Fasting today. Past history of hyperlipidemia. Will recheck today. Also patient is attempting further pregnancy. No longer on birth control.   Plan: Lipid panel reflex to direct LDL Fasting,         Glucose            (E66.09,  Z68.35) Class 2 obesity due to excess calories without serious comorbidity with body mass index (BMI) of 35.0 to 35.9 in adult  Comment: discussed diet and exercise changes. Offered health . Denied for now   Plan: TSH with free T4 reflex              COUNSELING:  Reviewed preventive health counseling, as reflected in patient instructions       Regular exercise       Healthy diet/nutrition       self breast exams    Estimated body mass " "index is 35.58 kg/m  as calculated from the following:    Height as of this encounter: 1.689 m (5' 6.5\").    Weight as of this encounter: 101.5 kg (223 lb 12.8 oz).    Weight management plan: Discussed healthy diet and exercise guidelines     reports that she has never smoked. She has never used smokeless tobacco.      Counseling Resources:  ATP IV Guidelines  Pooled Cohorts Equation Calculator  Breast Cancer Risk Calculator  FRAX Risk Assessment  ICSI Preventive Guidelines  Dietary Guidelines for Americans, 2010  USDA's MyPlate  ASA Prophylaxis  Lung CA Screening    Wero Cornell PA-C  [facility]  Answers for HPI/ROS submitted by the patient on 7/31/2019   Annual Exam:  If you checked off any problems, how difficult have these problems made it for you to do your work, take care of things at home, or get along with other people?: Not difficult at all  PHQ9 TOTAL SCORE: 2  PAOLO 7 TOTAL SCORE: 1    "

## 2019-08-01 ASSESSMENT — PATIENT HEALTH QUESTIONNAIRE - PHQ9: SUM OF ALL RESPONSES TO PHQ QUESTIONS 1-9: 2

## 2019-08-01 ASSESSMENT — ANXIETY QUESTIONNAIRES: GAD7 TOTAL SCORE: 1

## 2019-11-05 ENCOUNTER — HEALTH MAINTENANCE LETTER (OUTPATIENT)
Age: 24
End: 2019-11-05

## 2019-12-23 ENCOUNTER — TELEPHONE (OUTPATIENT)
Dept: OBGYN | Facility: CLINIC | Age: 24
End: 2019-12-23

## 2019-12-23 NOTE — TELEPHONE ENCOUNTER
Patient calling to schedule first OB visits.  Prior patient of ours.  Baby born 11/28/2018.  Current pregnancy LMP 11/21.  Would we be willing to do a phone visit?  Friday's work best for her, but she can be flexible.    Thanks.

## 2019-12-26 NOTE — TELEPHONE ENCOUNTER
Add her in on Friday, 1/3/20 around 11am. Either on the BV or BL nurse schedule for a NPN nurse visit. Make sure that it is scheduled as a NPN.    Thanks   Sarina BEE RN

## 2020-01-16 ENCOUNTER — TELEPHONE (OUTPATIENT)
Dept: OBGYN | Facility: CLINIC | Age: 25
End: 2020-01-16

## 2020-01-16 DIAGNOSIS — Z34.90 SUPERVISION OF NORMAL PREGNANCY: Primary | ICD-10-CM

## 2020-01-16 NOTE — TELEPHONE ENCOUNTER
Spoke with pt as she never had nurse done.     She will have a NPN nurse visit done over the phone tomorrow, 1/17/20, with Delmar BEE RN

## 2020-01-17 ENCOUNTER — PRENATAL OFFICE VISIT (OUTPATIENT)
Dept: NURSING | Facility: CLINIC | Age: 25
End: 2020-01-17
Payer: COMMERCIAL

## 2020-01-17 VITALS — HEIGHT: 67 IN | BODY MASS INDEX: 35.47 KG/M2 | WEIGHT: 226 LBS

## 2020-01-17 DIAGNOSIS — Z34.81 ENCOUNTER FOR SUPERVISION OF OTHER NORMAL PREGNANCY IN FIRST TRIMESTER: Primary | ICD-10-CM

## 2020-01-17 DIAGNOSIS — Z34.81 ENCOUNTER FOR SUPERVISION OF OTHER NORMAL PREGNANCY IN FIRST TRIMESTER: ICD-10-CM

## 2020-01-17 DIAGNOSIS — Z34.90 SUPERVISION OF NORMAL PREGNANCY: ICD-10-CM

## 2020-01-17 LAB
ABO + RH BLD: NORMAL
ABO + RH BLD: NORMAL
BLD GP AB SCN SERPL QL: NORMAL
BLOOD BANK CMNT PATIENT-IMP: NORMAL
ERYTHROCYTE [DISTWIDTH] IN BLOOD BY AUTOMATED COUNT: 15.6 % (ref 10–15)
HCT VFR BLD AUTO: 36.4 % (ref 35–47)
HGB BLD-MCNC: 11 G/DL (ref 11.7–15.7)
MCH RBC QN AUTO: 23.5 PG (ref 26.5–33)
MCHC RBC AUTO-ENTMCNC: 30.2 G/DL (ref 31.5–36.5)
MCV RBC AUTO: 78 FL (ref 78–100)
PLATELET # BLD AUTO: 290 10E9/L (ref 150–450)
RBC # BLD AUTO: 4.69 10E12/L (ref 3.8–5.2)
SPECIMEN EXP DATE BLD: NORMAL
WBC # BLD AUTO: 8.7 10E9/L (ref 4–11)

## 2020-01-17 PROCEDURE — 87340 HEPATITIS B SURFACE AG IA: CPT | Performed by: FAMILY MEDICINE

## 2020-01-17 PROCEDURE — 99207 ZZC NO CHARGE NURSE ONLY: CPT

## 2020-01-17 PROCEDURE — 86900 BLOOD TYPING SEROLOGIC ABO: CPT | Performed by: FAMILY MEDICINE

## 2020-01-17 PROCEDURE — 36415 COLL VENOUS BLD VENIPUNCTURE: CPT | Performed by: FAMILY MEDICINE

## 2020-01-17 PROCEDURE — 86762 RUBELLA ANTIBODY: CPT | Performed by: FAMILY MEDICINE

## 2020-01-17 PROCEDURE — 87389 HIV-1 AG W/HIV-1&-2 AB AG IA: CPT | Performed by: FAMILY MEDICINE

## 2020-01-17 PROCEDURE — 86850 RBC ANTIBODY SCREEN: CPT | Performed by: FAMILY MEDICINE

## 2020-01-17 PROCEDURE — 86901 BLOOD TYPING SEROLOGIC RH(D): CPT | Performed by: FAMILY MEDICINE

## 2020-01-17 PROCEDURE — 87086 URINE CULTURE/COLONY COUNT: CPT | Performed by: FAMILY MEDICINE

## 2020-01-17 PROCEDURE — 86780 TREPONEMA PALLIDUM: CPT | Performed by: FAMILY MEDICINE

## 2020-01-17 PROCEDURE — 85027 COMPLETE CBC AUTOMATED: CPT | Performed by: FAMILY MEDICINE

## 2020-01-17 RX ORDER — PRENATAL VIT/IRON FUM/FOLIC AC 27MG-0.8MG
1 TABLET ORAL DAILY
Qty: 90 TABLET | Refills: 3
Start: 2020-01-17 | End: 2020-11-13

## 2020-01-17 ASSESSMENT — MIFFLIN-ST. JEOR: SCORE: 1804.82

## 2020-01-17 NOTE — PROGRESS NOTES
"Chief Complaint   Patient presents with     Prenatal Care     New Prenatal Phone Visit   8w1d  Estimated Date of Delivery: Aug 27, 2020      Initial Ht 1.689 m (5' 6.5\")   Wt 102.5 kg (226 lb)   LMP 2019   BMI 35.93 kg/m   Estimated body mass index is 35.93 kg/m  as calculated from the following:    Height as of this encounter: 1.689 m (5' 6.5\").    Weight as of this encounter: 102.5 kg (226 lb).  BP completed using cuff size: NA (Not Taken)    Questioned patient about current smoking habits.  Pt. has never smoked.      Prenatal book and folder (containing standard educational hand-outs and brochures) given to patient at ultrasound appt. Information in folder reviewed. Questions answered. Brochure given on optional screening available to assess chromosomal anomalies. Pt advised to call the clinic if she has any questions or concerns related to her pregnancy. Prenatal labs obtained. New prenatal visit scheduled on 20 with Dr Orellana.        Lab Results   Component Value Date    PAP NIL 2017           Patient supplied answers from flow sheet for:  Prenatal OB Questionnaire.  Past Medical History  Diabetes?: No  Hypertension : No  Heart disease, mitral valve prolapse or rheumatic fever?: No  An autoimmune disease such as lupus or rheumatoid arthritis?: No  Kidney disease or urinary tract infection?: No  Epilepsy, seizures or spells?: No  Migraine headaches?: No  A stroke or loss of function or sensation?: No  Any other neurological problems?: No  Have you ever been treated for depression?: No  Are you having problems with crying spells or loss of self-esteem?: No  Have you ever required psychiatric care?: No  Have you ever had hepatitis, liver disease or jaundice?: No  Have you been treated for blood clots in your veins, deep vein thromosis, inflammation in the veins, thrombosis, phlebitis, pulmonary embolism or varicosities?: No  Have you had excessive bleeding after surgery or dental work?: " No  Do you bleed more than other women after a cut or scratch?: No  Do you have a history of anemia?: (!) Yes  Have you ever had thyroid problems or taken thyroid medication?: No   Do you have any endocrine problems?: No  Have you ever been in a major accident or suffered serious trauma?: No  Within the last year, has anyone hit, slapped, kicked or otherwise hurt you?: No  In the last year, has anyone forced you to have sex when you didn't want to?: No    Past Medical History 2   Have you ever received a blood transfusion?: No  Would you refuse a blood transfusion if a doctor judged it to be medically necessary?: No   If you answered Yes, would you rather die than receive a blood transfusion?: No  If you answered Yes, is this for Jainism reasons?: No  Does anyone in your home smoke?: No  Do you use tobacco products?: No  Do you drink beer, wine or hard liquor?: No  Do you use any of the following: marijuana, speed, cocaine, heroin, hallucinogens or other drugs?: No   Is your blood type Rh negative?: No  Have you ever had abnormal antibodies in your blood?: No  Have you ever had asthma?: No  Have you ever had tuberculosis?: No  Do you have any allergies to drugs or over-the-counter medications?: (!) Yes(staterra and protonix)  Allergies: Dust Mites, Aspartame, Ethanol, Venlafaxine, Hydrochloride, Sertraline: No  Have you had any breast problems?: No  Have you ever ?: (!) Yes  Have you had any gynecological surgical procedures such as cervical conization, a LEEP procedure, laser treatment, cryosurgery of the cervix or a dilation and curettage, etc?: No  Have you ever had any other surgical procedures?: No  Have you been hospitalized for a nonsurgical reason excluding normal delivery?: No  Have you ever had any anesthetic complications?: No  Have you ever had an abnormal pap smear?: No    Past Medical History (Continued)  Do you have a history of abnormalities of the uterus?: No  Did your mother take JONNA or  any other hormones when she was pregnant with you?: No  Did it take you more than a year to become pregnant?: No  Have you ever been evaluated or treated for infertility?: No  Is there a history of medical problems in your family, which you feel may be important to this pregnancy?: No  Do you have any other problems we have not asked about which you feel may be important to this pregnancy?: No    Symptoms since last menstrual period  Do you have any of the following symptoms: abdominal pain, blood in stools or urine, chest pain, shortness of breath, coughing or vomiting up blood, your heart racing or skipping beats, nausea and vomiting, pain on urination or vaginal discharge or bleed: (!) Yes(nausea)  Current medications, including over-the-counter medications, you are using? (If not applicable answer none): PNV  Will the patient be 35 years old or older at the time of delivery?: No    Has the patient, baby's father or anyone in either family had:  Thalassemia (Italian, Greek, Mediterranean or  background only) and an MCV result less than 80?: No  Neural tube defect such as meningomyelocele, spina bifida or anencephaly?: No  Congenital heart defect?: No  Down's Syndrome?: No  Aris-Sachs disease (Restorationist, Cajun, Kiswahili-[place])?: No  Sickle cell disease or trait ()?: No  Hemophilia or other inherited problems of blood?: No  Muscular dystrophy?: No  Cystic fibrosis?: No  [place]'s chorea?: No  Mental retardation/autism?: No  If yes, was the person tested for fragile X?: No  Any other inherited genetic or chromosomal disorder?: No  Maternal metabolic disorder (e.g Insulin-dependent diabetes, PKU)?: No  A child with birth defects not listed above?: No  Recurrent pregnancy loss or stillbirth?: No   Has the patient had any medications/street drugs/alcohol since her last menstrual period?: No  Does the patient or baby's father have any other genetic risks?: No    Infection History   Do you object to being  tested for Hepatitis B?: No  Do you object to being tested for HIV?: No   Do you feel that you are at high risk for coming in contact with the AIDS virus?: No  Have you ever been treated for tuberculosis?: No  Have you ever had a positive skin test for tuberculosis?: No  Do you live with someone who has tuberculosis?: No  Have you ever been exposed to tuberculosis?: No  Do you have genital herpes?: No  Does your partner have genital herpes?: No  Have you had a viral illness since your last period?: No  Have you ever had gonorrhea, chlamydia, syphilis, venereal warts, trichomoniasis, pelvic inflammatory disease or any other sexually transmitted disease?: No  Do you know if you are a genital group B streptococcus carrier?: No  Have you had chicken pox/varicella?: No   Have you been vaccinated against chicken Pox?: (!) Yes  Have you had any other infectious diseases?: No  Jackelin Muniz RN

## 2020-01-18 LAB
BACTERIA SPEC CULT: NO GROWTH
SPECIMEN SOURCE: NORMAL

## 2020-01-18 NOTE — RESULT ENCOUNTER NOTE
Please notify pt of U/S results showing a viable IUP  Please revise EDC to 9/2/20  Margarito Evans M.D.

## 2020-01-19 LAB
RUBV IGG SERPL IA-ACNC: 33 IU/ML
T PALLIDUM AB SER QL: NONREACTIVE

## 2020-01-20 LAB
HBV SURFACE AG SERPL QL IA: NONREACTIVE
HIV 1+2 AB+HIV1 P24 AG SERPL QL IA: NONREACTIVE

## 2020-02-07 ENCOUNTER — PRENATAL OFFICE VISIT (OUTPATIENT)
Dept: OBGYN | Facility: CLINIC | Age: 25
End: 2020-02-07
Payer: COMMERCIAL

## 2020-02-07 VITALS
HEART RATE: 76 BPM | HEIGHT: 67 IN | DIASTOLIC BLOOD PRESSURE: 74 MMHG | BODY MASS INDEX: 35.63 KG/M2 | SYSTOLIC BLOOD PRESSURE: 126 MMHG | WEIGHT: 227 LBS

## 2020-02-07 DIAGNOSIS — Z34.80 ENCOUNTER FOR SUPERVISION OF OTHER NORMAL PREGNANCY, UNSPECIFIED TRIMESTER: Primary | ICD-10-CM

## 2020-02-07 PROCEDURE — 99207 ZZC FIRST OB VISIT: CPT | Performed by: OBSTETRICS & GYNECOLOGY

## 2020-02-07 RX ORDER — VITAMIN B COMPLEX
TABLET ORAL DAILY
COMMUNITY
End: 2020-04-17 | Stop reason: DRUGHIGH

## 2020-02-07 ASSESSMENT — MIFFLIN-ST. JEOR: SCORE: 1809.36

## 2020-02-07 NOTE — PROGRESS NOTES
This is a 24 year old female patient,   who presents for her first obstetrical visit. This pregnancy is Planned, Desired.    EDC Sep 2, 2020 by Previous US which makes her 10w2d  today.  Her cycles are irregular.  Her last menstrual period was normal. Since her LMP, she has experienced  nausea and fatigue.  She denies vaginal discharge, pelvic pain and vaginal bleeding. Ultrasound in the 1st trimester showed EDC inconsistent with dates by LMP.     OB History    Para Term  AB Living   2 1 1 0 0 1   SAB TAB Ectopic Multiple Live Births   0 0 0 0 1      # Outcome Date GA Lbr [place]/2nd Weight Sex Delivery Anes PTL Lv   2 Current            1 Term 18 39w6d 11: 03:51 3.68 kg (8 lb 1.8 oz) F Vag-Vacuum EPI N ALLYSON      Complications: Chorioamnionitis      Name: ALEX SALINAS JS      Apgar1: 7  Apgar5: 8       History of GDM: No,  PTL : No,  History of HTN in pregnancy: No,  Thrombocytopenia: No,  Shoulder dystocia: No,  Vacuum Extraction: Yes no issues.  PPH: No   3rd of 4th degree laceration: No.   Other complications: No      Since her last LMP she denies use of alcohol, tobacco and street drugs.    HISTORY:  Past Medical History:   Diagnosis Date     Anemia      Attention deficit disorder without mention of hyperactivity     not on meds now, tried several     Cellulitis and abscess of trunk 11/10/2017     Hyperlipidemia LDL goal <160 2011     Past Surgical History:   Procedure Laterality Date     NO HISTORY OF SURGERY       Family History   Problem Relation Age of Onset     Lipids Mother      Diabetes Type 2  Mother      Lipids Father      C.A.D. No family hx of      Social History     Socioeconomic History     Marital status:      Spouse name: Kolton     Number of children: 1     Years of education: None     Highest education level: Associate degree: occupational, technical, or vocational program   Occupational History     Occupation: supply chain     Employer: [employer]   Social Needs     Financial resource strain: Not hard at all     Food insecurity:     Worry: Never true     Inability: Never true     Transportation needs:     Medical: No     Non-medical: No   Tobacco Use     Smoking status: Never Smoker     Smokeless tobacco: Never Used   Substance and Sexual Activity     Alcohol use: No     Frequency: Monthly or less     Drinks per session: 1 or 2     Binge frequency: Less than monthly     Drug use: No     Sexual activity: Yes     Partners: Male   Lifestyle     Physical activity:     Days per week: 4 days     Minutes per session: 80 min     Stress: Not at all   Relationships     Social connections:     Talks on phone: More than three times a week     Gets together: More than three times a week     Attends Mosque service: Patient refused     Active member of club or organization: No     Attends meetings of clubs or organizations: Never     Relationship status:      Intimate partner violence:     Fear of current or ex partner: None     Emotionally abused: None     Physically abused: None     Forced sexual activity: None   Other Topics Concern     Parent/sibling w/ CABG, MI or angioplasty before 65F 55M? No   Social History Narrative     None     Current Outpatient Medications   Medication Sig     Vitamin D3 (CHOLECALCIFEROL) 25 mcg (1000 units) tablet Take by mouth daily     Prenatal Vit-Fe Fumarate-FA (PRENATAL MULTIVITAMIN W/IRON) 27-0.8 MG tablet Take 1 tablet by mouth daily     No current facility-administered medications for this visit.      Allergies   Allergen Reactions     Pork Allergy      Protonix [Pantoprazole] Hives     Strattera [Atomoxetine Hydrochloride]      palpitations       Past medical, surgical, social and family history were reviewed and updated in EPIC.    ROS:   12 point review of systems negative other than symptoms noted below.    EXAM:  /74   Pulse 76   Ht 1.689 m (5' 6.5\")   Wt 103 kg (227 lb)   LMP 11/21/2019   " Breastfeeding No   BMI 36.09 kg/m     BMI: Body mass index is 36.09 kg/m .    EXAM:  Constitutional: Appearance: Well nourished, well developed alert, in no acute distress  Chest:  Respiratory Effort:  Breathing unlabored  Cardiovascular:Heart    Auscultation:  Regular rate, normal rhythm, no murmurs present  Gastrointestinal:  Abdominal Examination:  Abdomen nontender to palpation, tone normal without     rigidity or guarding, no masses present, umbilicus without lesions    Liver and speen:  No hepatomegaly present, liver nontender to palpation    Hernias:  No hernias present    FHTs not heard due to gestational age and habitus.  Skin:  General Inspection:  No rashes present, no lesions present, no areas of  discoloration.  Neurologic/Psychiatric:    Mental Status:  Oriented X3       Pelvis: not needed today.    ASSESSMENT:      ICD-10-CM    1. Encounter for supervision of other normal pregnancy, unspecified trimester Z34.80        PLAN:    Prenatal labs reviewed. She has no questions.    Discussed options for screening for and diagnosis of chromosomal anomalies, including first trimester screen, noninvasive prenatal testing/cell-free fetal DNA testing, CVS/amniocentesis, quad screen, and ultrasound or comprehensive Level II US at 18-20 weeks. She is electing ultrasound at 18-20 weeks only and declines other options.    Reviewed early pregnancy education, diet, exercise, prenatal vitamins, intercourse. Reviewed the call schedule, labor and delivery, and the schedule of prenatal visits.    Follow up in 4 weeks. She is encouraged to call sooner with questions or concerns.      Marlen Orellana MD

## 2020-02-07 NOTE — NURSING NOTE
"Chief Complaint   Patient presents with     Prenatal Care       Initial /74   Pulse 76   Ht 1.689 m (5' 6.5\")   Wt 103 kg (227 lb)   LMP 2019   Breastfeeding No   BMI 36.09 kg/m   Estimated body mass index is 36.09 kg/m  as calculated from the following:    Height as of this encounter: 1.689 m (5' 6.5\").    Weight as of this encounter: 103 kg (227 lb).  BP completed using cuff size: regular    Questioned patient about current smoking habits.  Pt. has never smoked.        10w2d  - bleeding  + cramping   + occas. Nausea & vomiting  - headache    The following HM Due: NONE  Hyun Gautam LPN                     "

## 2020-02-26 ENCOUNTER — TELEPHONE (OUTPATIENT)
Dept: OBGYN | Facility: CLINIC | Age: 25
End: 2020-02-26

## 2020-02-26 NOTE — TELEPHONE ENCOUNTER
Form received from: Pt;  Ana Mauricio     Form requesting following info/need: ELIZABETH Oliver     JESUS needed?: NA     Location of form: Basket above Hortencia's desk     When completed the route for return: Please fax to number on form and call pt to  a copy

## 2020-03-03 NOTE — TELEPHONE ENCOUNTER
Impervahart message sent to patient requesting forms to be resent if needing immediate completion for early pregnancy and generally do not complete forms this early in the pregnancy for routine prenatal care and future delivery. Hyun Gautam LPN

## 2020-03-05 ENCOUNTER — TRANSCRIBE ORDERS (OUTPATIENT)
Dept: MATERNAL FETAL MEDICINE | Facility: CLINIC | Age: 25
End: 2020-03-05

## 2020-03-05 ENCOUNTER — PRENATAL OFFICE VISIT (OUTPATIENT)
Dept: OBGYN | Facility: CLINIC | Age: 25
End: 2020-03-05
Payer: COMMERCIAL

## 2020-03-05 VITALS
BODY MASS INDEX: 35.94 KG/M2 | WEIGHT: 229 LBS | DIASTOLIC BLOOD PRESSURE: 80 MMHG | SYSTOLIC BLOOD PRESSURE: 120 MMHG | HEIGHT: 67 IN

## 2020-03-05 DIAGNOSIS — O26.90 PREGNANCY RELATED CONDITION, ANTEPARTUM: Primary | ICD-10-CM

## 2020-03-05 DIAGNOSIS — E55.9 VITAMIN D DEFICIENCY: Primary | ICD-10-CM

## 2020-03-05 DIAGNOSIS — E66.812 OBESITY, CLASS II, BMI 35-39.9, ISOLATED: ICD-10-CM

## 2020-03-05 DIAGNOSIS — D50.8 OTHER IRON DEFICIENCY ANEMIA: ICD-10-CM

## 2020-03-05 PROCEDURE — 99207 ZZC PRENATAL VISIT: CPT | Performed by: FAMILY MEDICINE

## 2020-03-05 RX ORDER — CHOLECALCIFEROL (VITAMIN D3) 50 MCG
1 TABLET ORAL DAILY
Qty: 90 TABLET | Refills: 3 | Status: SHIPPED | OUTPATIENT
Start: 2020-03-05 | End: 2020-11-13

## 2020-03-05 ASSESSMENT — MIFFLIN-ST. JEOR: SCORE: 1818.43

## 2020-03-05 NOTE — PROGRESS NOTES
"CC: Here for routine prenatal visit   24 year old y/o  @ 14w1d with Estimated Date of Delivery: Sep 2, 2020     /80   Ht 1.689 m (5' 6.5\")   Wt 103.9 kg (229 lb)   LMP 2019   BMI 36.41 kg/m    See OB flowsheet  no fetal movement, no contractions, no bleeding, no loss of fluid   Discussed monitoring fetal movement     This document serves as a record of the services and decisions personally performed and made by Ashley Hernandez DO. It was created on her behalf by Tyra Lorenzo, a trained medical scribe. The creation of this document is based on the provider's statements to the medical scribe.  Tyra Lorenzo 8:10 AM 2020    1) concerns: Pt reports fatigue. Rx: Vitamin D3 (2000 units) and ferrous sulfate (SLO-FE) called in. She has a diagnosis of anemia and vitamin d deficiency, but hasn't taken the medications.  Discussed that slo-fe sometimes not covered, but can be obtained OTC.    2) Routine: no gs,   3) Risk Factors:    A: Class II obesity: us with MFM   B: Hx of small AC last pregnancy: ? growth ultrasound 28 weeks  4)  Return: in four weeks     The information in this document, created by the medical scribe for me, accurately reflects the services I personally performed and the decisions made by me. I have reviewed and approved this document for accuracy prior to leaving the patient care area.  2020 8:19 AM    David    "

## 2020-03-05 NOTE — NURSING NOTE
"14w1d    Chief Complaint   Patient presents with     Prenatal Care     pt is very tired--pt would like a restriction letter for work--very physical work       Initial /80   Ht 1.689 m (5' 6.5\")   Wt 103.9 kg (229 lb)   LMP 2019   BMI 36.41 kg/m   Estimated body mass index is 36.41 kg/m  as calculated from the following:    Height as of this encounter: 1.689 m (5' 6.5\").    Weight as of this encounter: 103.9 kg (229 lb).  BP completed using cuff size: regular    Questioned patient about current smoking habits.  Pt. has never smoked.          The following HM Due: NONE           "

## 2020-03-05 NOTE — PATIENT INSTRUCTIONS
"Return 4 weeks   Return to clinic:  every 4 weeks till 28 weeks, then every 2 weeks till 36 weeks, then weekly till delivery      Phone numbers [place]:  Day/ night [phone] ask for ob triage  Emergency:  Call labor and delivery:  [phone]    What should I call about??    Contraction every 5 minutes for 1 hour 1 minute long (511), bleeding, loss of fluid, headache that doesn't resolve with tylenol, and decreased fetal movement     Start kick counts @ 26-28 weeks   There is an delaney for this!  It is called \"count the kicks\"  Keep track of movement and discover your normal baby movement pattern   guideline is listed below  Please call if you do not feel the baby move!  We will have you come in for fetal heart rate monitoring:   Perception of at least 10 FMs during 12 hours of normal maternal activity   Perception of least 10 FMs over two hours when the mother is at rest and focused on [facility] Address   [address], [place], [place] [address] [phone]    Dr. Ashley Hernandez, DO    OB/GYN   [facility]                                                      "

## 2020-03-05 NOTE — LETTER
Ana Mauricio  [address] [address] [address]      March 5, 2020        To whom it may concern,       The above patient is pregnant with Estimated Date of Delivery: Sep 2, 2020.  She will need to be on work restriction, light duty.  She can lift up to 25 pounds.  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        Sincerely,        Dr. Ashley Hernandez, DO    Obstetrics and Gynecology  [facility]       MRN:   [id number]

## 2020-03-16 ENCOUNTER — TELEPHONE (OUTPATIENT)
Dept: FAMILY MEDICINE | Facility: CLINIC | Age: 25
End: 2020-03-16

## 2020-03-16 ENCOUNTER — MYC MEDICAL ADVICE (OUTPATIENT)
Dept: OBGYN | Facility: CLINIC | Age: 25
End: 2020-03-16

## 2020-03-16 NOTE — TELEPHONE ENCOUNTER
Pt calls, works at [employer], 15 weeks pregnant, denies fever, cough, rash, international travel, slight scratchy throat, wonders if restrictions for working due to COVID 19 since pregnant, will check with employer and/or prenatal doctor  Eleni Webber RN, BSN  Message handled by Nurse Triage.

## 2020-03-18 ENCOUNTER — TELEPHONE (OUTPATIENT)
Dept: OBGYN | Facility: CLINIC | Age: 25
End: 2020-03-18

## 2020-03-18 NOTE — TELEPHONE ENCOUNTER
Please reference past encounters regarding this.     Patient dropped off FMLA forms to be completed and the Nurse wrote back that we typically don't complete this early on in pregnancy and to resend if needing sooner. Patient said she needs this for intermittent FMLA for morning sickness and had this in the past.     She's going to contact HR to have forms resent.

## 2020-03-24 NOTE — TELEPHONE ENCOUNTER
Form received from: Pt; Ana Mauricio     Form requesting following info/need: Intermittent FMLA- [place]     JESUS needed?: NA     Location of form: brought to nurse     When completed the route for return: fax to number on form

## 2020-03-26 NOTE — TELEPHONE ENCOUNTER
Form completed, copy sent to scanning, original faxed and placed in [facility] nurse bin.Hyun Gautam LPN

## 2020-03-27 ENCOUNTER — VIRTUAL VISIT (OUTPATIENT)
Dept: OBGYN | Facility: CLINIC | Age: 25
End: 2020-03-27
Payer: COMMERCIAL

## 2020-03-27 VITALS — WEIGHT: 225 LBS | BODY MASS INDEX: 35.77 KG/M2

## 2020-03-27 DIAGNOSIS — Z34.80 ENCOUNTER FOR SUPERVISION OF OTHER NORMAL PREGNANCY, UNSPECIFIED TRIMESTER: Primary | ICD-10-CM

## 2020-03-27 PROCEDURE — 99207 ZZC PRENATAL VISIT: CPT | Performed by: OBSTETRICS & GYNECOLOGY

## 2020-03-27 NOTE — NURSING NOTE
"Ana Mauricio is a 24 year old female who is being evaluated via a billable telephone visit.      The patient has been notified of following:     \"This telephone visit will be conducted via a call between you and your physician/provider. We have found that certain health care needs can be provided without the need for a physical exam.  This service lets us provide the care you need with a short phone conversation.  If a prescription is necessary we can send it directly to your pharmacy.  If lab work is needed we can place an order for that and you can then stop by our lab to have the test done at a later time.    If during the course of the call the physician/provider feels a telephone visit is not appropriate, you will not be charged for this service.\"     Ana Mauricio complains of   Chief Complaint   Patient presents with     Prenatal Care     17 2/7 weeks      +flutters  +++headaches  +slight dizziness  + sharp pain in side  Anatomy U/S scheduled for 20    I have reviewed and updated the patient's Past Medical History, Social History, Family History and Medication List.    ALLERGIES  Protonix [pantoprazole] and Strattera [atomoxetine hydrochloride]    Job is very physical, if walking for more than 15 min feels like a pain in her side - upper left side, worse when moving or at the end of the day.  + nausea no vomiting.  Has bad GERD.  + HA - hasn't taken tylenol    24 year old  at 17w2d   - reviewed MSK pains, GERD, constipation, trapped cutaneous nerve as all possible explanations for her LUQ pain, reviewed palliative measures for tx of symptoms and when to call  - reviewed precautions for prevention of COVID19 and maternal/fetal risks  - discussed recommendation for iron supplement in all pregnant women due to risk of blood bank shortages  - reviewed alternate prenatal care schedule including virtual visits  Risk Factors:               A: Class II obesity: us with MFM              B: Hx of small " AC last pregnancy: ? growth ultrasound 28 weeks     RTC 3-4 weeks for anatomy US    Whit Bermudez MD, MPH  [facility]       Phone call duration:  17 minutes    Whit Bremudez MD

## 2020-04-02 ENCOUNTER — OFFICE VISIT (OUTPATIENT)
Dept: URGENT CARE | Facility: URGENT CARE | Age: 25
End: 2020-04-02
Payer: COMMERCIAL

## 2020-04-02 ENCOUNTER — RESULTS ONLY (OUTPATIENT)
Dept: LAB | Age: 25
End: 2020-04-02

## 2020-04-02 DIAGNOSIS — J11.1 INFLUENZA-LIKE ILLNESS: Primary | ICD-10-CM

## 2020-04-02 PROCEDURE — 99213 OFFICE O/P EST LOW 20 MIN: CPT | Performed by: FAMILY MEDICINE

## 2020-04-02 NOTE — PROGRESS NOTES
SUBJECTIVE:  This 24 year old female presents for COVID-19 testing.  she reports cough.  Onset of symptoms was days.  Exposure history: ?    OBJECTIVE:  Visual assessment shows:  GENERAL APPEARANCE is healthy without evident apparent respiratory distress  BREATHING: the patient is breathing comfortably and is speaking full sentences    ASSESSMENT/PLAN:    ICD-10-CM    1. Influenza-like illness  R69          Dr. Moreno Henry

## 2020-04-03 LAB
SARS-COV-2 RNA SPEC QL NAA+PROBE: NOT DETECTED
SPECIMEN SOURCE: NORMAL

## 2020-04-14 ENCOUNTER — PRE VISIT (OUTPATIENT)
Dept: MATERNAL FETAL MEDICINE | Facility: CLINIC | Age: 25
End: 2020-04-14

## 2020-04-17 ENCOUNTER — OFFICE VISIT (OUTPATIENT)
Dept: MATERNAL FETAL MEDICINE | Facility: CLINIC | Age: 25
End: 2020-04-17
Attending: FAMILY MEDICINE
Payer: COMMERCIAL

## 2020-04-17 ENCOUNTER — HOSPITAL ENCOUNTER (OUTPATIENT)
Dept: ULTRASOUND IMAGING | Facility: CLINIC | Age: 25
End: 2020-04-17
Attending: FAMILY MEDICINE
Payer: COMMERCIAL

## 2020-04-17 ENCOUNTER — PRENATAL OFFICE VISIT (OUTPATIENT)
Dept: OBGYN | Facility: CLINIC | Age: 25
End: 2020-04-17
Payer: COMMERCIAL

## 2020-04-17 VITALS — WEIGHT: 231 LBS | DIASTOLIC BLOOD PRESSURE: 64 MMHG | BODY MASS INDEX: 36.73 KG/M2 | SYSTOLIC BLOOD PRESSURE: 106 MMHG

## 2020-04-17 DIAGNOSIS — E66.812 OBESITY, CLASS II, BMI 35-39.9, ISOLATED: ICD-10-CM

## 2020-04-17 DIAGNOSIS — O26.90 PREGNANCY RELATED CONDITION, ANTEPARTUM: ICD-10-CM

## 2020-04-17 DIAGNOSIS — Z34.80 ENCOUNTER FOR SUPERVISION OF OTHER NORMAL PREGNANCY, UNSPECIFIED TRIMESTER: Primary | ICD-10-CM

## 2020-04-17 DIAGNOSIS — Z36.89 ENCOUNTER FOR FETAL ANATOMIC SURVEY: Primary | ICD-10-CM

## 2020-04-17 PROCEDURE — 76811 OB US DETAILED SNGL FETUS: CPT

## 2020-04-17 PROCEDURE — 99207 ZZC PRENATAL VISIT: CPT | Performed by: OBSTETRICS & GYNECOLOGY

## 2020-04-17 NOTE — PROGRESS NOTES
"Please see \"Imaging\" tab under \"Chart Review\" for details of today's US.    Donna Monson, DO    "

## 2020-04-17 NOTE — PROGRESS NOTES
23yo  at 20w2d here for scheduled visit.  Had U/S in MFM just prior to today's visit.  Results pending.  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  COVID mitigation measures discussed.  RTC 4 weeks.

## 2020-04-17 NOTE — NURSING NOTE
"Chief Complaint   Patient presents with     Prenatal Care   20w2d  Discuss work environment    initial /64   Wt 104.8 kg (231 lb)   LMP 11/21/2019   BMI 36.73 kg/m   Estimated body mass index is 36.73 kg/m  as calculated from the following:    Height as of 3/5/20: 1.689 m (5' 6.5\").    Weight as of this encounter: 104.8 kg (231 lb).  BP completed using cuff size large.  Mabel Nicholson CMA  '  "

## 2020-04-29 ENCOUNTER — HOSPITAL ENCOUNTER (OUTPATIENT)
Facility: CLINIC | Age: 25
Discharge: HOME | End: 2020-04-29
Attending: OBSTETRICS & GYNECOLOGY | Admitting: OBSTETRICS & GYNECOLOGY
Payer: COMMERCIAL

## 2020-04-29 ENCOUNTER — TELEPHONE (OUTPATIENT)
Dept: OBGYN | Facility: CLINIC | Age: 25
End: 2020-04-29

## 2020-04-29 ENCOUNTER — HOSPITAL ENCOUNTER (OUTPATIENT)
Facility: CLINIC | Age: 25
End: 2020-04-29
Admitting: OBSTETRICS & GYNECOLOGY
Payer: COMMERCIAL

## 2020-04-29 VITALS
WEIGHT: 231 LBS | BODY MASS INDEX: 36.26 KG/M2 | SYSTOLIC BLOOD PRESSURE: 109 MMHG | HEIGHT: 67 IN | HEART RATE: 82 BPM | RESPIRATION RATE: 18 BRPM | DIASTOLIC BLOOD PRESSURE: 61 MMHG | TEMPERATURE: 98.4 F

## 2020-04-29 PROCEDURE — G0463 HOSPITAL OUTPT CLINIC VISIT: HCPCS

## 2020-04-29 ASSESSMENT — MIFFLIN-ST. JEOR: SCORE: 1835.44

## 2020-04-29 NOTE — TELEPHONE ENCOUNTER
Patient callinw0d  Has been noticing a liquid discharge for 1 week.  Watery, no odor, not sticky, not like normal discharge.  Underwear is always wet in the last week.  Has noticed some abdominal tightening more often in last few days.  Has not really felt fetal movement.  (anterior placenta)    Pleaser advise.  Jackelin Muniz RN

## 2020-04-29 NOTE — PLAN OF CARE
Data: Patient presented to [facility]: 2020  3:14 PM.  Reason for maternal/fetal assessment is decreased fetal movement, leaking vaginal fluid. Patient reports that she has not felt the baby move as much and thought maybe she was leaking some fluid.  Patient is a .  Prenatal record reviewed. Pregnancy has been uncomplicated..  Gestational Age 22w0d. VSS. Fetal movement present. Patient denies uterine contractions, vaginal bleeding, abdominal pain, pelvic pressure, nausea, vomiting, headache, visual disturbances, epigastric or URQ pain, significant edema. Support person is not present.   Action: Verbal consent for EFM. Triage assessment completed. Bill of rights reviewed.  Response: Patient verbalized agreement with plan. Will contact Dr Per Manning with update and further orders.

## 2020-04-29 NOTE — PLAN OF CARE
Data: Patient assessed in the [facility]place for decreased fetal movement, leaking vaginal fluid.  Cervical exam not examined.  Membranes intact.  Contractions none.  Action:  Presumed adequate fetal oxygenation documented (see flow record). Discharge instructions reviewed.  Patient instructed to report change in fetal movement, vaginal leaking of fluid or bleeding, abdominal pain, or any concerns related to the pregnancy to her nurse/physician.    Response: Orders to discharge home per Per Manning.  Patient verbalized understanding of education and verbalized agreement with plan. Discharged to home at 1555.

## 2020-04-29 NOTE — PROVIDER NOTIFICATION
04/29/20 1540   Provider Notification   Provider Name/Title Dr. Manning   Method of Notification At Bedside   Request Evaluate in Person   Notification Reason Membrane Status   Dr. Manning at bedside to do an ultrasound to look for fluid and heartbeat.  Fetal heartbeat is 140's and lots of fluid around the baby and the placenta is anterior.  Orders received to discharge home and ok to go back to work.  Follow up at next appt.

## 2020-04-29 NOTE — DISCHARGE INSTRUCTIONS
Discharge Instruction for Undelivered Patients      You were seen for: Membrane Assessment and Fetal Assessment  We Consulted: Dr. Manning  You had (Test or Medicine):bedside ultrasound     Diet:   Drink 8 to 12 glasses of liquids (milk, juice, water) every day.  You may eat meals and snacks.     Activity:  Call your doctor or nurse midwife if your baby is moving less than usual.     Call your provider if you notice:  Swelling in your face or increased swelling in your hands or legs.  Headaches that are not relieved by Tylenol (acetaminophen).  Changes in your vision (blurring: seeing spots or stars.)  Nausea (sick to your stomach) and vomiting (throwing up).   Weight gain of 5 pounds or more per week.  Heartburn that doesn't go away.  Signs of bladder infection: pain when you urinate (use the toilet), need to go more often and more urgently.  The bag of carias (rupture of membranes) breaks, or you notice leaking in your underwear.  Bright red blood in your underwear.  Abdominal (lower belly) or stomach pain.  Second (plus) baby: Contractions (tightening) less than 10 minutes apart and getting stronger.  *If less than 34 weeks: Contractions (tightenings) more than 6 times in one hour.  Increase or change in vaginal discharge (note the color and amount)      Follow-up:  As scheduled in the clinic

## 2020-05-04 ENCOUNTER — VIRTUAL VISIT (OUTPATIENT)
Dept: OBGYN | Facility: CLINIC | Age: 25
End: 2020-05-04
Payer: COMMERCIAL

## 2020-05-04 VITALS — BODY MASS INDEX: 36.18 KG/M2 | WEIGHT: 231 LBS

## 2020-05-04 DIAGNOSIS — O46.92 VAGINAL BLEEDING IN PREGNANCY, SECOND TRIMESTER: Primary | ICD-10-CM

## 2020-05-04 PROCEDURE — 99207 ZZC PRENATAL VISIT: CPT | Performed by: FAMILY MEDICINE

## 2020-05-04 NOTE — PROGRESS NOTES
CC: Here for routine prenatal visit   telephone visit, patient gives verbal consent for telephone visit    24 year old y/o  @ 22w5d with Estimated Date of Delivery: Sep 2, 2020     Wt 104.8 kg (231 lb)   LMP 2019   BMI 36.18 kg/m    See OB flowsheet  + fetal movement, no contractions, no bleeding, no loss of fluid   Discussed monitoring fetal movement     1) concerns: has spotting when she works on the weekend on a different position  Will have her come in for wet prep and UA (quarter size area of spotting bright red seen)   2) Routine: O+ RI   3) Risk factors:   A: Class II Obesity: MFM us normal  B: Hx of growth restriction plan for growth us 28 weeks  4) Return: 4 weeks for ob visit, if UA and wet prep normal, recommend office visit to be seen for history of bleeding.      Time of visit:   minutes    David

## 2020-05-05 DIAGNOSIS — O46.92 VAGINAL BLEEDING IN PREGNANCY, SECOND TRIMESTER: ICD-10-CM

## 2020-05-05 LAB
ALBUMIN UR-MCNC: NEGATIVE MG/DL
AMORPH CRY #/AREA URNS HPF: ABNORMAL /HPF
APPEARANCE UR: ABNORMAL
BILIRUB UR QL STRIP: NEGATIVE
COLOR UR AUTO: YELLOW
GLUCOSE UR STRIP-MCNC: NEGATIVE MG/DL
HGB UR QL STRIP: NEGATIVE
KETONES UR STRIP-MCNC: NEGATIVE MG/DL
LEUKOCYTE ESTERASE UR QL STRIP: NEGATIVE
NITRATE UR QL: NEGATIVE
NON-SQ EPI CELLS #/AREA URNS LPF: ABNORMAL /LPF
PH UR STRIP: 7.5 PH (ref 5–7)
RBC #/AREA URNS AUTO: ABNORMAL /HPF
SOURCE: ABNORMAL
SP GR UR STRIP: 1.02 (ref 1–1.03)
SPECIMEN SOURCE: NORMAL
UROBILINOGEN UR STRIP-ACNC: 0.2 EU/DL (ref 0.2–1)
WBC #/AREA URNS AUTO: ABNORMAL /HPF
WET PREP SPEC: NORMAL

## 2020-05-05 PROCEDURE — 81001 URINALYSIS AUTO W/SCOPE: CPT | Performed by: FAMILY MEDICINE

## 2020-05-05 PROCEDURE — 87591 N.GONORRHOEAE DNA AMP PROB: CPT | Performed by: FAMILY MEDICINE

## 2020-05-05 PROCEDURE — 87210 SMEAR WET MOUNT SALINE/INK: CPT | Performed by: FAMILY MEDICINE

## 2020-05-05 PROCEDURE — 87491 CHLMYD TRACH DNA AMP PROBE: CPT | Performed by: FAMILY MEDICINE

## 2020-05-08 ENCOUNTER — HOSPITAL ENCOUNTER (OUTPATIENT)
Dept: ULTRASOUND IMAGING | Facility: CLINIC | Age: 25
End: 2020-05-08
Attending: OBSTETRICS & GYNECOLOGY
Payer: COMMERCIAL

## 2020-05-08 ENCOUNTER — OFFICE VISIT (OUTPATIENT)
Dept: MATERNAL FETAL MEDICINE | Facility: CLINIC | Age: 25
End: 2020-05-08
Attending: OBSTETRICS & GYNECOLOGY
Payer: COMMERCIAL

## 2020-05-08 PROCEDURE — 76816 OB US FOLLOW-UP PER FETUS: CPT

## 2020-05-08 NOTE — PROGRESS NOTES
Please refer to ultrasound report under 'Imaging' Studies of 'Chart Review' tabs.    Marbin Interiano M.D.

## 2020-05-19 ENCOUNTER — TELEPHONE (OUTPATIENT)
Dept: OBGYN | Facility: CLINIC | Age: 25
End: 2020-05-19

## 2020-05-19 DIAGNOSIS — N93.9 VAGINAL SPOTTING: Primary | ICD-10-CM

## 2020-05-19 NOTE — TELEPHONE ENCOUNTER
Yes, lets get her in for cervical length  Ordered   Please advise     Dr. Ashley Hernandez, DO    Obstetrics and Gynecology  [facility] [place]

## 2020-05-21 ENCOUNTER — TELEPHONE (OUTPATIENT)
Dept: OBGYN | Facility: CLINIC | Age: 25
End: 2020-05-21

## 2020-05-21 NOTE — TELEPHONE ENCOUNTER
Forms faxed to [phone]  Magaly Sam CMA        Pt dropped off [place] paperwork to get filled out and then faxed once complete.

## 2020-05-30 ENCOUNTER — NURSE TRIAGE (OUTPATIENT)
Dept: NURSING | Facility: CLINIC | Age: 25
End: 2020-05-30

## 2020-05-30 NOTE — TELEPHONE ENCOUNTER
Patient calling - says she does a different position at work on weekends and has been having vaginal bleeding on those weekends.  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  Patient is 26 weeks pregnant.  Says she has talked to her doctor about this so she is aware.  Her doctor told her to call and let her know if it continues.      She says she was at work for a couple hours this morning, had some abdominal cramping while at work which she believes were loyda holland type contractions.  Cramping was happening every couple minutes for 20 minutes.  She sat down and rested and the cramping resolved.  Then she felt pain in hip area.  This only happened when she walked then resolved.    She came home from work and all cramping and pain has resolved.  About 15 minutes ago she used the bathroom and noticed the spotting again with wiping.  She estimates the spotting to be about the size of two quarters.    Says she had bleeding with her first pregnancy as well.    No passing of clots.  No leakage of fluid.  No abdominal pain or cramping now.  No decrease in baby movement.    Triaged to disposition of Home Care. Care advice given per protocol.    Advised patient to call back if spotting worsens or lasts for more than 24 hours, a decrease in baby's movement occurs, leakage of fluid occurs, abdominal pain or cramping occur or she becomes worse.    Sammie Garcia RN  Triage Nurse Advisor    Additional Information    Negative: Passed out (i.e., lost consciousness, collapsed and was not responding)    Negative: Shock suspected (e.g., cold/pale/clammy skin, too weak to stand, low BP, rapid pulse)    Negative: Difficult to awaken or acting confused (e.g., disoriented, slurred speech)    Negative: SEVERE vaginal bleeding (e.g., continuous red blood from vagina, large blood clots)    Negative: [1] SEVERE abdominal pain (e.g., excruciating) AND [2] constant AND [3] present > 1 hour    Negative:  Sounds like a life-threatening emergency to the triager    Negative: [1] Vaginal bleeding AND [2] pregnant < 20 weeks    Negative: MILD-MODERATE vaginal bleeding (i.e., small to medium clots; like mild menstrual period)    Negative: Abdominal pain or having contractions    Negative: Leakage of fluid from vagina (or caller thinks she has ruptured her bag of carias)    Negative: Baby moving less today (e.g., kick count < 5 in 1 hour or < 10 in 2 hours)    Negative: [1] Pregnant 24-36 weeks () AND [2] pinkish or brownish mucous discharge    Negative: [1] Pregnant 20-23 weeks AND [2] pinkish or brownish mucous discharge    Single episode of faint spotting (e.g., noted when wiping after going to bathroom)    Negative: [1] Pregnant > 36 weeks AND [2] pinkish or brownish mucous discharge    Negative: [1] Pregnant > 36 weeks (term) AND [2] passed a small glob or chunk of mucous (may look like gelatin or snot)    Negative: Slight spotting after sexual intercourse (brief episode)    Negative: Slight spotting after a pelvic examination (brief episode)    Protocols used: PREGNANCY - VAGINAL BLEEDING GREATER THAN 20 WEEKS [facility]

## 2020-06-02 ENCOUNTER — PRENATAL OFFICE VISIT (OUTPATIENT)
Dept: OBGYN | Facility: CLINIC | Age: 25
End: 2020-06-02
Payer: COMMERCIAL

## 2020-06-02 VITALS — WEIGHT: 235.3 LBS | DIASTOLIC BLOOD PRESSURE: 68 MMHG | SYSTOLIC BLOOD PRESSURE: 122 MMHG | BODY MASS INDEX: 36.85 KG/M2

## 2020-06-02 DIAGNOSIS — O46.92 VAGINAL BLEEDING IN PREGNANCY, SECOND TRIMESTER: ICD-10-CM

## 2020-06-02 DIAGNOSIS — N89.8 VAGINAL DISCHARGE: ICD-10-CM

## 2020-06-02 DIAGNOSIS — Z34.80 ENCOUNTER FOR SUPERVISION OF OTHER NORMAL PREGNANCY, UNSPECIFIED TRIMESTER: Primary | ICD-10-CM

## 2020-06-02 LAB
ERYTHROCYTE [DISTWIDTH] IN BLOOD BY AUTOMATED COUNT: 15.4 % (ref 10–15)
HCT VFR BLD AUTO: 33.4 % (ref 35–47)
HGB BLD-MCNC: 10.3 G/DL (ref 11.7–15.7)
MCH RBC QN AUTO: 24.7 PG (ref 26.5–33)
MCHC RBC AUTO-ENTMCNC: 30.8 G/DL (ref 31.5–36.5)
MCV RBC AUTO: 80 FL (ref 78–100)
PLATELET # BLD AUTO: 232 10E9/L (ref 150–450)
RBC # BLD AUTO: 4.17 10E12/L (ref 3.8–5.2)
SPECIMEN SOURCE: NORMAL
WBC # BLD AUTO: 10.6 10E9/L (ref 4–11)
WET PREP SPEC: NORMAL

## 2020-06-02 PROCEDURE — 86780 TREPONEMA PALLIDUM: CPT | Performed by: OBSTETRICS & GYNECOLOGY

## 2020-06-02 PROCEDURE — 99207 ZZC PRENATAL VISIT: CPT | Performed by: OBSTETRICS & GYNECOLOGY

## 2020-06-02 PROCEDURE — 87210 SMEAR WET MOUNT SALINE/INK: CPT | Performed by: OBSTETRICS & GYNECOLOGY

## 2020-06-02 PROCEDURE — 82950 GLUCOSE TEST: CPT | Performed by: OBSTETRICS & GYNECOLOGY

## 2020-06-02 PROCEDURE — 85027 COMPLETE CBC AUTOMATED: CPT | Performed by: OBSTETRICS & GYNECOLOGY

## 2020-06-02 PROCEDURE — 36415 COLL VENOUS BLD VENIPUNCTURE: CPT | Performed by: OBSTETRICS & GYNECOLOGY

## 2020-06-02 NOTE — TELEPHONE ENCOUNTER
Routed to me 3 days after concern given, got answering machine.   Left message on answering machine for patient to call back.  Dr. Ashley Hernandez, DO    Obstetrics and Gynecology  [facility] [place]

## 2020-06-02 NOTE — PROGRESS NOTES
Here for routine obstetric visit at 26w6d  At work this weekend, had bright red bleeding per vagina. Lasted for just an hour or so, on two occasions (a few drops). Noticed in toilet and with wiping. No blood in bowel movements. Good fetal movement, no leakage of fluid.     No recent intercourse. No itch, burn, dysuria. Last had bleeding one month prior.  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 Has in the past had contractions with working, that decrease or go away with rest. Was the worst this weekend. Prior to this was furloughed and did not have many hours. This was her first weekend back to normal.  On exam, cervix is normal, no bleeding noted. Wet prep obtained again for discharge, but suspect this is physiologic.    Plan is light duty at work (note given) for 2 weeks until after her US and follow up with Dr. Hernandez. Then, we can make further arrangements as needed. Discussed that second TM bleeding is a risk factor for PTL/PPROM, so she needs to stay in touch with us if bleeding or any new symptoms.    Marlen Orellana MD

## 2020-06-02 NOTE — LETTER
June 2, 2020      Ana Mauricio  [address] [address] [id number]        To Whom It May Concern:    Ana Mauricio  was seen on 6/2/2020.  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

## 2020-06-02 NOTE — LETTER
Alexandra 3, 2020      Ana Mauricio  [address] [address] [address]        To Whom It May Concern,    Alexandra 3, 2020      Ana Mauricio  was seen on 6/2/2020.  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

## 2020-06-02 NOTE — NURSING NOTE
"Chief Complaint   Patient presents with     Prenatal Care     1 hout GCT, 28 weeks labs   Tadp next visit   Vaginal bleeding over weekend        Initial /68 (BP Location: Right arm, Patient Position: Chair, Cuff Size: Adult Large)   Wt 106.7 kg (235 lb 4.8 oz)   LMP 2019   Breastfeeding No   BMI 36.85 kg/m   Estimated body mass index is 36.85 kg/m  as calculated from the following:    Height as of 20: 1.702 m (5' 7\").    Weight as of this encounter: 106.7 kg (235 lb 4.8 oz).  BP completed using cuff size: large    Questioned patient about current smoking habits.  Pt. has never smoked.    26w6d      The following HM Due: NONE        +FM Daily   +Light bleeding over weekend   +Cramping with bleeding   +Hip pain     1 hour GCT and 28 week labs     Tdap next visit       Catie Canela, SPARKLE on 2020 at 11:09 AM                 "

## 2020-06-03 LAB
GLUCOSE 1H P 50 G GLC PO SERPL-MCNC: 179 MG/DL (ref 60–129)
T PALLIDUM AB SER QL: NONREACTIVE

## 2020-06-05 DIAGNOSIS — Z34.80 ENCOUNTER FOR SUPERVISION OF OTHER NORMAL PREGNANCY, UNSPECIFIED TRIMESTER: ICD-10-CM

## 2020-06-05 PROCEDURE — 36415 COLL VENOUS BLD VENIPUNCTURE: CPT | Performed by: OBSTETRICS & GYNECOLOGY

## 2020-06-05 PROCEDURE — 82952 GTT-ADDED SAMPLES: CPT | Performed by: OBSTETRICS & GYNECOLOGY

## 2020-06-05 PROCEDURE — 82951 GLUCOSE TOLERANCE TEST (GTT): CPT | Performed by: OBSTETRICS & GYNECOLOGY

## 2020-06-06 LAB
GLUCOSE 1H P 100 G GLC PO SERPL-MCNC: 179 MG/DL (ref 60–179)
GLUCOSE 2H P 100 G GLC PO SERPL-MCNC: 169 MG/DL (ref 60–154)
GLUCOSE 3H P 100 G GLC PO SERPL-MCNC: 117 MG/DL (ref 60–139)
GLUCOSE P FAST SERPL-MCNC: 84 MG/DL (ref 60–94)

## 2020-06-09 DIAGNOSIS — N93.9 VAGINAL SPOTTING: ICD-10-CM

## 2020-06-18 ENCOUNTER — PRENATAL OFFICE VISIT (OUTPATIENT)
Dept: OBGYN | Facility: CLINIC | Age: 25
End: 2020-06-18
Payer: COMMERCIAL

## 2020-06-18 VITALS
DIASTOLIC BLOOD PRESSURE: 70 MMHG | BODY MASS INDEX: 37.26 KG/M2 | SYSTOLIC BLOOD PRESSURE: 126 MMHG | WEIGHT: 237.4 LBS | HEIGHT: 67 IN

## 2020-06-18 DIAGNOSIS — Z34.80 ENCOUNTER FOR SUPERVISION OF OTHER NORMAL PREGNANCY, UNSPECIFIED TRIMESTER: Primary | ICD-10-CM

## 2020-06-18 DIAGNOSIS — R10.2 PELVIC PRESSURE IN FEMALE: ICD-10-CM

## 2020-06-18 DIAGNOSIS — K21.9 GASTROESOPHAGEAL REFLUX DISEASE WITHOUT ESOPHAGITIS: ICD-10-CM

## 2020-06-18 PROCEDURE — 90715 TDAP VACCINE 7 YRS/> IM: CPT | Performed by: FAMILY MEDICINE

## 2020-06-18 PROCEDURE — 90471 IMMUNIZATION ADMIN: CPT | Performed by: FAMILY MEDICINE

## 2020-06-18 PROCEDURE — 99207 ZZC PRENATAL VISIT: CPT | Performed by: FAMILY MEDICINE

## 2020-06-18 ASSESSMENT — MIFFLIN-ST. JEOR: SCORE: 1864.47

## 2020-06-18 NOTE — PATIENT INSTRUCTIONS
"Return 2 weeks   Return to clinic:  every 4 weeks till 28 weeks, then every 2 weeks till 36 weeks, then weekly till delivery      Phone numbers [place]:  Day/ night [phone] ask for ob triage  Emergency:  Call labor and delivery:  [phone]    What should I call about??    Contraction every 5 minutes for 1 hour 1 minute long (511), bleeding, loss of fluid, headache that doesn't resolve with tylenol, and decreased fetal movement     Start kick counts @ 26-28 weeks   There is an delaney for this!  It is called \"count the kicks\"  Keep track of movement and discover your normal baby movement pattern   guideline is listed below  Please call if you do not feel the baby move!  We will have you come in for fetal heart rate monitoring:   Perception of at least 10 FMs during 12 hours of normal maternal activity   Perception of least 10 FMs over two hours when the mother is at rest and focused on [facility] Address   [address], [place], [place] [address] [phone]    Dr. Ashley Hernandez, DO    OB/GYN   [facility]                                                      "

## 2020-06-18 NOTE — NURSING NOTE
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    Weight as of this encounter: 107.7 kg (237 lb 6.4 oz).  BP completed using cuff size: regular    Questioned patient about current smoking habits.  Pt. has never smoked.          The following HM Due: NONE             "

## 2020-06-18 NOTE — PROGRESS NOTES
"CC: Here for routine prenatal visit   24 year old y/o  @ 29w1d with Estimated Date of Delivery: Sep 2, 2020     /70   Ht 1.702 m (5' 7\")   Wt 107.7 kg (237 lb 6.4 oz)   LMP 2019   BMI 37.18 kg/m    See OB flowsheet  + fetal movement, no contractions, no bleeding, no loss of fluid   Discussed monitoring fetal movement     1) concerns:   Covid-19 concerns: none  2) Routine: O+ RI, tdap   3) Risk factors:   A: Class II Obesity: MFM us normal  B: Hx of growth restriction, by AC: plan for growth us 28 weeks (normal), and 34 weeks   C: Hx of 3rd TM bleeding: now better on restriction and instead being more exposed to covid-19   And wonders about exposure to covid.    Letter written for patient to be off work starting @ 36 weeks.  4) Return: 2 weeks   Tilldeandre    "

## 2020-06-18 NOTE — LETTER
Ana Mauricio  [address] [address] [facility] [address]      June 18, 2020        To whom it may concern,   The above patient is due Estimated Date of Delivery: Sep 2, 2020,   She will being FMLA/maternity leave due to covid-19 pandemic @ 36 weeks   And will start leave on 8/5/2020.       Sincerely,        Dr. Ashley Hernandez, DO    Obstetrics and Gynecology  [facility] [place]

## 2020-06-18 NOTE — LETTER
Ana Mauricio  [address] [address] [address]      June 18, 2020        Ana Mauricio  [address] [address]        To Whom It May Concern,    Alexandra 3, 2020      Ana Mauricio  was seen on 6/2/2020.  Until her next appointment with us on 6/29/20, our recommendation for this pregnancy due to complications is for her to be on light duty, to include:    No lifting greater than 10lb  No bending and lifting greater than 10 #  Frequent breaks as needed to sit determined by patient.   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 Ashley Hernandez, DO    Obstetrics and Gynecology  [facility]     MRN:   [id number]

## 2020-06-29 ENCOUNTER — PRENATAL OFFICE VISIT (OUTPATIENT)
Dept: OBGYN | Facility: CLINIC | Age: 25
End: 2020-06-29
Payer: COMMERCIAL

## 2020-06-29 VITALS
HEIGHT: 67 IN | SYSTOLIC BLOOD PRESSURE: 124 MMHG | BODY MASS INDEX: 37.78 KG/M2 | DIASTOLIC BLOOD PRESSURE: 68 MMHG | WEIGHT: 240.7 LBS

## 2020-06-29 DIAGNOSIS — Z87.59 HISTORY OF PRIOR PREGNANCY WITH IUGR NEWBORN: Primary | ICD-10-CM

## 2020-06-29 PROCEDURE — 99207 ZZC PRENATAL VISIT: CPT | Performed by: FAMILY MEDICINE

## 2020-06-29 RX ORDER — CIMETIDINE 800 MG
800 TABLET ORAL AT BEDTIME
Qty: 90 TABLET | Refills: 3 | Status: SHIPPED | OUTPATIENT
Start: 2020-06-29 | End: 2020-08-06

## 2020-06-29 ASSESSMENT — MIFFLIN-ST. JEOR: SCORE: 1879.44

## 2020-06-29 NOTE — PROGRESS NOTES
"CC: Here for routine prenatal visit   24 year old y/o  @ 30w5d with Estimated Date of Delivery: Sep 2, 2020     /68   Ht 1.702 m (5' 7\")   Wt 109.2 kg (240 lb 11.2 oz)   LMP 2019   BMI 37.70 kg/m    See OB flowsheet  + fetal movement, no contractions, no bleeding, no loss of fluid   Discussed monitoring fetal movement     1) concerns: none  Covid-19 concerns: none   2) Routine: O+ RI, tdap, XY   3) Risk factors:   A: Class II Obesity, BMI 37: MFM us normal, lovenox if CS    Passed 3 hour gtt  B: Hx of growth restriction, by AC: plan for growth us 28 weeks (normal), and 34 weeks us ordered  C: Hx of 3rd TM bleeding: now better on restriction and instead being more exposed to covid-19              And wonders about exposure to covid.                Letter written for patient to be off work starting @ 36 weeks.  4) Return: 2 weeks     David    "

## 2020-06-29 NOTE — NURSING NOTE
"30w5d    Chief Complaint   Patient presents with     Prenatal Care     back pain and leg pain--nauseous--heartburn medication is not working       Initial /68   Ht 1.702 m (5' 7\")   Wt 109.2 kg (240 lb 11.2 oz)   LMP 2019   BMI 37.70 kg/m   Estimated body mass index is 37.7 kg/m  as calculated from the following:    Height as of this encounter: 1.702 m (5' 7\").    Weight as of this encounter: 109.2 kg (240 lb 11.2 oz).  BP completed using cuff size: regular    Questioned patient about current smoking habits.  Pt. has never smoked.          The following HM Due: NONE             "

## 2020-06-29 NOTE — PATIENT INSTRUCTIONS
"Return 2 weeks   Return to clinic:  every 4 weeks till 28 weeks, then every 2 weeks till 36 weeks, then weekly till delivery      Phone numbers [place]:  Day/ night [phone] ask for ob triage  Emergency:  Call labor and delivery:  [phone]    What should I call about??    Contraction every 5 minutes for 1 hour 1 minute long (511), bleeding, loss of fluid, headache that doesn't resolve with tylenol, and decreased fetal movement     Start kick counts @ 26-28 weeks   There is an delaney for this!  It is called \"count the kicks\"  Keep track of movement and discover your normal baby movement pattern   guideline is listed below  Please call if you do not feel the baby move!  We will have you come in for fetal heart rate monitoring:   Perception of at least 10 FMs during 12 hours of normal maternal activity   Perception of least 10 FMs over two hours when the mother is at rest and focused on [facility] Address   [address], [place], [place] [address] [phone]    Dr. Ashley Hernandez, DO    OB/GYN   [facility]                                                      "

## 2020-07-02 ENCOUNTER — NURSE TRIAGE (OUTPATIENT)
Dept: NURSING | Facility: CLINIC | Age: 25
End: 2020-07-02

## 2020-07-02 NOTE — TELEPHONE ENCOUNTER
"S: 31 weeks 1 day pregnant.  B: Having some lower abdominal slight pressure/cramping only when she walks, rates pain \"3\".  Has a dropping sensation.  When she sits the pain is a \"7\".  Having some left side discomfort.  Baby is moving \" a lot today.\"  In the early morning around 0200 or 0300 when she get up to urinate.  She had difficulty getting her stream started and then feels she could still go to the BR more.  She sits on the toilet and is unable to produce more urine.  She describes her pain as tightening and pinching.  No frequency or blood.    A: Writer paged on call provider Dr. Aly at 7:11pm.  R: Dr. Aly returned call at 7:15pm  He feels her abdominal pressure/cramping may be the third trimester burdens of pregnancy and she may have a UTI.  The patient can either come into the birth center to night to be examined and give a urine sample or call the clinic in the morning to make an appointment and give a urine sample.  Writer called patient with her options.  She is going to call the clinic in the morning to get an appointment.  Ramila No RN, [facility]      Additional Information    Negative: Passed out (i.e., lost consciousness, collapsed and was not responding)    Negative: Shock suspected (e.g., cold/pale/clammy skin, too weak to stand, low BP, rapid pulse)    Negative: Difficult to awaken or acting confused (e.g., disoriented, slurred speech)    Negative: [1] SEVERE abdominal pain (e.g., excruciating) AND [2] constant AND [3] present > 1 hour    Negative: Severe vaginal bleeding (e.g., continuous red blood from vagina, or large blood clots)    Negative: Sounds like a life-threatening emergency to the triager    Negative: MODERATE-SEVERE abdominal pain (e.g., interferes with normal activities, awakens from sleep)    Negative: Vaginal bleeding or spotting    Negative: [1] Baby moving less today (e.g., kick count < 5 in 1 hour or < 10 in 2 hours) AND [2] pregnant 23 or more " weeks    Negative: Leakage of fluid from vagina    Negative: New hand or face swelling    Negative: New blurred vision or vision changes    Negative: [1] SEVERE headache AND [2] not relieved with acetaminophen (e.g., Tylenol)    Negative: [1] MILD abdominal pain (e.g., doesn't interfere with normal activities) AND [2] constant AND [3] present > 2 hours    Negative: [1] Intermittent lower abdominal pain AND [2] present > 24 hours    Negative: Fever > 100.4 F (38.0 C)    Negative: Blood in urine (red, pink, or tea-colored)    Negative: White of the eyes have turned yellow (i.e., jaundice)    Negative: Patient sounds very sick or weak to the triager    Pain or burning with passing urine (urination)    Protocols used: PREGNANCY - ABDOMINAL PAIN GREATER THAN 20 WEEKS EGA-A-    COVID 19 Nurse Triage Plan/Patient Instructions    Please be aware that novel coronavirus (COVID-19) may be circulating in the community. If you develop symptoms such as fever, cough, or SOB or if you have concerns about the presence of another infection including coronavirus (COVID-19), please contact your health care provider or visit [email or web address].     Disposition/Instructions    Patient to schedule an In Person Visit with provider. Reference Visit Selection Guide.    Thank you for taking steps to prevent the spread of this virus.  o Limit your contact with others.  o Wear a simple mask to cover your cough.  o Wash your hands well and often.    Resources    [facility]: About COVID-19: [email or web address]    CDC: What to Do If You're Sick: www.cdc.gov/coronavirus/2019-ncov/about/steps-when-sick.html    CDC: Ending Home Isolation: www.cdc.gov/coronavirus/2019-ncov/hcp/disposition-in-home-patients.html     CDC: Caring for Someone: www.cdc.gov/coronavirus/2019-ncov/if-you-are-sick/care-for-someone.html     FRANNY: Interim Guidance for Hospital Discharge to Home:  www.health.[facility].[place].us/diseases/coronavirus/hcp/hospdischarge.pdf    [facility] clinical trials (COVID-19 research studies): [email or web address]     Below are the COVID-19 hotlines at the [facility] of Health ([facility]). Interpreters are available.   o For health questions: Call 520-554-1724 or 1-963.170.7211 (7 a.m. to 7 p.m.)  o For questions about schools and childcare: Call [phone] or [phone] (7 a.m. to 7 p.m.)

## 2020-07-03 ENCOUNTER — PRENATAL OFFICE VISIT (OUTPATIENT)
Dept: OBGYN | Facility: CLINIC | Age: 25
End: 2020-07-03
Payer: COMMERCIAL

## 2020-07-03 VITALS
WEIGHT: 243 LBS | HEIGHT: 67 IN | HEART RATE: 92 BPM | SYSTOLIC BLOOD PRESSURE: 122 MMHG | TEMPERATURE: 98.4 F | BODY MASS INDEX: 38.14 KG/M2 | DIASTOLIC BLOOD PRESSURE: 78 MMHG

## 2020-07-03 DIAGNOSIS — Z34.80 ENCOUNTER FOR SUPERVISION OF OTHER NORMAL PREGNANCY, UNSPECIFIED TRIMESTER: ICD-10-CM

## 2020-07-03 DIAGNOSIS — R30.0 DYSURIA: Primary | ICD-10-CM

## 2020-07-03 LAB
ALBUMIN UR-MCNC: NEGATIVE MG/DL
APPEARANCE UR: CLEAR
BILIRUB UR QL STRIP: NEGATIVE
COLOR UR AUTO: YELLOW
GLUCOSE UR STRIP-MCNC: 250 MG/DL
HGB UR QL STRIP: NEGATIVE
KETONES UR STRIP-MCNC: NEGATIVE MG/DL
LEUKOCYTE ESTERASE UR QL STRIP: NEGATIVE
NITRATE UR QL: NEGATIVE
PH UR STRIP: 6.5 PH (ref 5–7)
SOURCE: ABNORMAL
SP GR UR STRIP: 1.02 (ref 1–1.03)
UROBILINOGEN UR STRIP-ACNC: 0.2 EU/DL (ref 0.2–1)

## 2020-07-03 PROCEDURE — 81003 URINALYSIS AUTO W/O SCOPE: CPT | Performed by: OBSTETRICS & GYNECOLOGY

## 2020-07-03 PROCEDURE — 99207 ZZC PRENATAL VISIT: CPT | Performed by: OBSTETRICS & GYNECOLOGY

## 2020-07-03 PROCEDURE — 87086 URINE CULTURE/COLONY COUNT: CPT | Performed by: OBSTETRICS & GYNECOLOGY

## 2020-07-03 RX ORDER — NITROFURANTOIN 25; 75 MG/1; MG/1
100 CAPSULE ORAL 2 TIMES DAILY
Qty: 14 CAPSULE | Refills: 0 | Status: SHIPPED | OUTPATIENT
Start: 2020-07-03 | End: 2020-08-06

## 2020-07-03 ASSESSMENT — MIFFLIN-ST. JEOR: SCORE: 1889.87

## 2020-07-03 NOTE — NURSING NOTE
"Chief Complaint   Patient presents with     Prenatal Care       Initial /78   Pulse 92   Temp 98.4  F (36.9  C) (Oral)   Ht 1.702 m (5' 7\")   Wt 110.2 kg (243 lb)   LMP 2019   Breastfeeding No   BMI 38.06 kg/m   Estimated body mass index is 38.06 kg/m  as calculated from the following:    Height as of this encounter: 1.702 m (5' 7\").    Weight as of this encounter: 110.2 kg (243 lb).  BP completed using cuff size: regular    Questioned patient about current smoking habits.  Pt. has never smoked.          31w2d  + FM daily  - bleeding or leaking of fluid  + edema  + cramping x 1-2 days  + chills last night  - fever  Hyun Gautam LPN               "

## 2020-07-03 NOTE — PROGRESS NOTES
"Doing well.   Returns to clinic to discuss complaints that she expressed with FNA nurse yesterday.   1-2 week duration of ongoing low pelvic pressure and interchangeable right and left sided discomfort.   Also endorses mild irregular contractions and dysuria  Denies VB, ctx, LOF. +FM  /78   Pulse 92   Temp 98.4  F (36.9  C) (Oral)   Ht 1.702 m (5' 7\")   Wt 110.2 kg (243 lb)   LMP 2019   Breastfeeding No   BMI 38.06 kg/m    General Appearance: NAD  Abdomen: Gravid, NT  Refer to flow sheet above. Defers fundal height check and fetal doptone assessment today  A/P: 24 year old  at 31w2d  -- concerns addressed above, reassurance provided, likely musculoskeletal related  -- dysuria: empiric macrobid prescribed, UA and urine culture collected to confirm completion of antibiotic course  -- PTL precautions reviewed  RTC in 2 week    Henri Aly MD  [facility]             "

## 2020-07-04 LAB
BACTERIA SPEC CULT: NORMAL
SPECIMEN SOURCE: NORMAL

## 2020-07-06 ENCOUNTER — TELEPHONE (OUTPATIENT)
Dept: OBGYN | Facility: CLINIC | Age: 25
End: 2020-07-06

## 2020-07-06 NOTE — TELEPHONE ENCOUNTER
Form received from: [employer]    Form requesting following info/need: STD    JESUS needed?: No    Location of form: Hortencia's desk    When completed the route for return: Fax [phone]

## 2020-07-06 NOTE — TELEPHONE ENCOUNTER
Form completed and signed. Form faxed to [facility] at [phone]. Original form sent to scan into pt chart. Copy of form placed in KT completed form folder.     Luana Jarrell CMA

## 2020-07-06 NOTE — TELEPHONE ENCOUNTER
"Patient callinw5d    1.States is still having a intermittent cramping feeling lower abdomen. Worse when walking. When urinates has a pinching sensation and bijan area aches, lasts for a little bit after urination then goes away.  Was put on antibiotic for ? UTI/ culture neg, so stopped.  States is staying hydrated and does not hold urine    2. Would like a note to shorten hours at work from 8 hrs a day to 6 hrs due to increased contractions and cramping while working.  Very short staffed and hard to do just \"light duty\"    Please advise.  Jackelin Muniz RN      "

## 2020-07-06 NOTE — TELEPHONE ENCOUNTER
Sounds good, ok to write note please   Dr. Ashley Hernandez, DO    Obstetrics and Gynecology  [facility] [place]

## 2020-07-06 NOTE — RESULT ENCOUNTER NOTE
Inform patient that her urine culture returned negative for bacteria that would cause a UTI.   She is advised to discontinue antibiotics that were prescribed to her since it would not be treating anything.     Henri Aly MD

## 2020-07-07 NOTE — TELEPHONE ENCOUNTER
Letter needs start and end dates.  Do you me to add tomorrow's date?  And do you want end date to be Aug 5th when she is already set to be taken out of work?    Letter done if you are okay with it    Pt can pick it up.    Nataly STEPHENS R.N.

## 2020-07-09 NOTE — TELEPHONE ENCOUNTER
Can you check with the patient if the note is satisfactory?   Dr. Ashley Hernandez, DO    Obstetrics and Gynecology  [facility] and [place]

## 2020-07-09 NOTE — TELEPHONE ENCOUNTER
Letter signed and routed   Dr. Ashley Hernandez,     Obstetrics and Gynecology  [facility] [place] [facility] [place]

## 2020-07-20 ENCOUNTER — PRENATAL OFFICE VISIT (OUTPATIENT)
Dept: OBGYN | Facility: CLINIC | Age: 25
End: 2020-07-20
Payer: COMMERCIAL

## 2020-07-20 VITALS — BODY MASS INDEX: 38.69 KG/M2 | SYSTOLIC BLOOD PRESSURE: 110 MMHG | DIASTOLIC BLOOD PRESSURE: 72 MMHG | WEIGHT: 247 LBS

## 2020-07-20 DIAGNOSIS — Z34.93 PRENATAL CARE IN THIRD TRIMESTER: Primary | ICD-10-CM

## 2020-07-20 DIAGNOSIS — E66.812 CLASS 2 OBESITY DUE TO EXCESS CALORIES WITHOUT SERIOUS COMORBIDITY WITH BODY MASS INDEX (BMI) OF 35.0 TO 35.9 IN ADULT: ICD-10-CM

## 2020-07-20 DIAGNOSIS — E66.09 CLASS 2 OBESITY DUE TO EXCESS CALORIES WITHOUT SERIOUS COMORBIDITY WITH BODY MASS INDEX (BMI) OF 35.0 TO 35.9 IN ADULT: ICD-10-CM

## 2020-07-20 PROCEDURE — 99207 ZZC PRENATAL VISIT: CPT | Performed by: ADVANCED PRACTICE MIDWIFE

## 2020-07-20 RX ORDER — CALCIUM CARBONATE 500 MG/1
1 TABLET, CHEWABLE ORAL 2 TIMES DAILY
COMMUNITY
End: 2020-11-13

## 2020-07-20 NOTE — PROGRESS NOTES
Feeling well.  Baby is active. Denies any leaking of fluid, vaginal bleeding, regular uterine contractions, or headaches or other concerns.  She has a growth US scheduled.    Reviewed to call for contractions, loss of fluid, vaginal bleeding, decreased fetal movement or any other questions or concerns.    RTC in 2 weeks.  Deja Hubbard, JAX, APRN, CNM

## 2020-07-20 NOTE — NURSING NOTE
"Chief Complaint   Patient presents with     Prenatal Care       Initial /72 (BP Location: Right arm, Cuff Size: Adult Regular)   Wt 112 kg (247 lb)   LMP 2019   BMI 38.69 kg/m   Estimated body mass index is 38.69 kg/m  as calculated from the following:    Height as of 7/3/20: 1.702 m (5' 7\").    Weight as of this encounter: 112 kg (247 lb).  BP completed using cuff size: regular    Questioned patient about current smoking habits.  Pt. has never smoked.          Eliseo Burgos MA               "

## 2020-07-30 ENCOUNTER — ANCILLARY PROCEDURE (OUTPATIENT)
Dept: ULTRASOUND IMAGING | Facility: CLINIC | Age: 25
End: 2020-07-30
Attending: FAMILY MEDICINE
Payer: COMMERCIAL

## 2020-07-30 DIAGNOSIS — Z87.59 HISTORY OF PRIOR PREGNANCY WITH IUGR NEWBORN: ICD-10-CM

## 2020-07-30 PROCEDURE — 76816 OB US FOLLOW-UP PER FETUS: CPT | Performed by: OBSTETRICS & GYNECOLOGY

## 2020-08-05 ENCOUNTER — NURSE TRIAGE (OUTPATIENT)
Dept: NURSING | Facility: CLINIC | Age: 25
End: 2020-08-05

## 2020-08-06 ENCOUNTER — PRENATAL OFFICE VISIT (OUTPATIENT)
Dept: OBGYN | Facility: CLINIC | Age: 25
End: 2020-08-06
Payer: COMMERCIAL

## 2020-08-06 VITALS — DIASTOLIC BLOOD PRESSURE: 78 MMHG | BODY MASS INDEX: 40.41 KG/M2 | WEIGHT: 258 LBS | SYSTOLIC BLOOD PRESSURE: 120 MMHG

## 2020-08-06 DIAGNOSIS — Z34.93 PRENATAL CARE IN THIRD TRIMESTER: Primary | ICD-10-CM

## 2020-08-06 DIAGNOSIS — Z34.80 ENCOUNTER FOR SUPERVISION OF OTHER NORMAL PREGNANCY, UNSPECIFIED TRIMESTER: ICD-10-CM

## 2020-08-06 PROCEDURE — 87653 STREP B DNA AMP PROBE: CPT | Performed by: OBSTETRICS & GYNECOLOGY

## 2020-08-06 PROCEDURE — 99207 ZZC PRENATAL VISIT: CPT | Performed by: OBSTETRICS & GYNECOLOGY

## 2020-08-06 NOTE — NURSING NOTE
"Chief Complaint   Patient presents with     Prenatal Care       Initial /78   Wt 117 kg (258 lb)   LMP 2019   Breastfeeding No   BMI 40.41 kg/m   Estimated body mass index is 40.41 kg/m  as calculated from the following:    Height as of 7/3/20: 1.702 m (5' 7\").    Weight as of this encounter: 117 kg (258 lb).  BP completed using cuff size: regular    Questioned patient about current smoking habits.  Pt. has never smoked.          36w1d  + FM daily  + nausea and vomiting  - headache  + edema all over - 11lb weight gain in 2 wks  - contractions, bleeding or leaking of fluid  Hyun Gautam LPN               "

## 2020-08-06 NOTE — PROGRESS NOTES
Here for routine obstetric visit at 36w1d  Growth US reviewed: 55%tile for growth (history asymmetric IUGR last pregnancy, but birth weight was 8lb).  Good fetal movement, no other issues.  Group B Strep today. Follow up weekly.  Signs and symptoms of labor reviewed, including when to call or come in for evaluation.    Marlen Orellana MD

## 2020-08-06 NOTE — TELEPHONE ENCOUNTER
Pt is calling.    She is 36 weeks pregnant. Patient stated that she is having an uncomplicated pregnancy with no issues.  Yesterday and today, she has noticed some decrease fetal movement. Started yesterday evening.  She did a kick count after 1 hour earlier today, and only felt 4 movements in an hour.  She has noticed some tightening in her uterus that comes and goes, but has not monitored these, and stated that they are not painful.   Abdomen is tender to the touch.  No vaginal bleeding, or discharge. No leaking of fluid.  No fever.  She is feeling well herself.  I advised her to eat something sweet, and drink.  Lay down and do a fetal kick count. If only 4 movements in an hour, count for 2 hours. You should feel at least 10 movements in 2 hours. If not, I advised her to go into labor and delivery to be assessed. Give us a call back before hand for re-evaluation.  Call back with any other questions or concerns.  She verbalized understanding.      Reason for Disposition    [1] Pregnant 23 or more weeks AND [2] baby moving less today by kick count  (e.g., kick count < 5 in 1 hour or < 10 in 2 hours)    Additional Information    Negative: Sounds like a life-threatening emergency to the triager    Negative: Injury to abdomen    Negative: [1] Pregnant > 36 weeks AND [2] having contractions or other symptoms of labor    Negative: [1] Pregnant < 37 weeks AND [2] having contractions or other symptoms of labor    Negative: [1] Pregnant > 20 weeks AND [2] abdominal pain    Negative: [1] Pregnant > 20 weeks AND [2] vaginal bleeding or spotting    Negative: New blurred vision or vision changes    Negative: [1] SEVERE headache AND [2] not relieved with acetaminophen (e.g., Tylenol)    Negative: Leakage of fluid from vagina    Protocols used: PREGNANCY - DECREASED FETAL MOVEMENT-A-    Christy Scherer RN  [facility] Triage Nurse Advisor  8/5/2020 at 10:33 PM

## 2020-08-07 LAB
GP B STREP DNA SPEC QL NAA+PROBE: NEGATIVE
SPECIMEN SOURCE: NORMAL

## 2020-08-13 ENCOUNTER — PRENATAL OFFICE VISIT (OUTPATIENT)
Dept: OBGYN | Facility: CLINIC | Age: 25
End: 2020-08-13
Payer: COMMERCIAL

## 2020-08-13 VITALS
DIASTOLIC BLOOD PRESSURE: 80 MMHG | BODY MASS INDEX: 40.78 KG/M2 | SYSTOLIC BLOOD PRESSURE: 132 MMHG | HEIGHT: 67 IN | WEIGHT: 259.8 LBS

## 2020-08-13 DIAGNOSIS — Z34.93 PRENATAL CARE IN THIRD TRIMESTER: Primary | ICD-10-CM

## 2020-08-13 PROCEDURE — 99207 ZZC PRENATAL VISIT: CPT | Performed by: OBSTETRICS & GYNECOLOGY

## 2020-08-13 ASSESSMENT — MIFFLIN-ST. JEOR: SCORE: 1966.08

## 2020-08-13 NOTE — NURSING NOTE
"37w1d    Chief Complaint   Patient presents with     Prenatal Care       Initial /80   Ht 1.702 m (5' 7\")   Wt 117.8 kg (259 lb 12.8 oz)   LMP 2019   BMI 40.69 kg/m   Estimated body mass index is 40.69 kg/m  as calculated from the following:    Height as of this encounter: 1.702 m (5' 7\").    Weight as of this encounter: 117.8 kg (259 lb 12.8 oz).  BP completed using cuff size: regular    Questioned patient about current smoking habits.  Pt. has never smoked.          The following HM Due: NONE         "

## 2020-08-13 NOTE — PROGRESS NOTES
"Doing well.   Requests membrane stripping.   Denies VB, ctx, LOF. +FM  /80   Ht 1.702 m (5' 7\")   Wt 117.8 kg (259 lb 12.8 oz)   LMP 2019   BMI 40.69 kg/m    General Appearance: NAD  Abdomen: Gravid, NT  Refer to flow sheet above.   A/P: 24 year old  at 37w1d  -- membranes swept today  -- size greater than dates: last growth US on  was 55th percentile   -- reviewed GBS neg status   -- labor precautions reviewed  RTC in 1 week to discuss induction of labor at 39w     Henri Aly MD  [facility]             "

## 2020-08-15 ENCOUNTER — NURSE TRIAGE (OUTPATIENT)
Dept: NURSING | Facility: CLINIC | Age: 25
End: 2020-08-15

## 2020-08-15 ENCOUNTER — HOSPITAL ENCOUNTER (OUTPATIENT)
Facility: CLINIC | Age: 25
Discharge: HOME | End: 2020-08-16
Attending: OBSTETRICS & GYNECOLOGY | Admitting: OBSTETRICS & GYNECOLOGY
Payer: COMMERCIAL

## 2020-08-15 VITALS
OXYGEN SATURATION: 99 % | RESPIRATION RATE: 18 BRPM | HEIGHT: 67 IN | WEIGHT: 258 LBS | HEART RATE: 90 BPM | BODY MASS INDEX: 40.49 KG/M2 | SYSTOLIC BLOOD PRESSURE: 128 MMHG | DIASTOLIC BLOOD PRESSURE: 80 MMHG | TEMPERATURE: 98.1 F

## 2020-08-15 PROCEDURE — G0463 HOSPITAL OUTPT CLINIC VISIT: HCPCS

## 2020-08-15 ASSESSMENT — MIFFLIN-ST. JEOR: SCORE: 1957.91

## 2020-08-16 PROBLEM — Z36.89 ENCOUNTER FOR TRIAGE IN PREGNANT PATIENT: Status: ACTIVE | Noted: 2018-09-04

## 2020-08-16 PROCEDURE — G0463 HOSPITAL OUTPT CLINIC VISIT: HCPCS

## 2020-08-16 PROCEDURE — 25000132 ZZH RX MED GY IP 250 OP 250 PS 637: Performed by: OBSTETRICS & GYNECOLOGY

## 2020-08-16 RX ORDER — HYDROXYZINE HYDROCHLORIDE 50 MG/1
100 TABLET, FILM COATED ORAL ONCE
Status: COMPLETED | OUTPATIENT
Start: 2020-08-16 | End: 2020-08-16

## 2020-08-16 RX ADMIN — HYDROXYZINE HYDROCHLORIDE 100 MG: 50 TABLET, FILM COATED ORAL at 00:43

## 2020-08-16 NOTE — PROVIDER NOTIFICATION
08/16/20 0030   Uterine Activity Assessment   Method external tocotransducer;palpation  (monitor removed )   Contraction Frequency (Minutes) 3-8   Contraction Duration (seconds) 30-90   Contraction Intensity mild by palpation   Uterine Resting Tone soft by palpation   Fetal Assessment   Fetal HR Assessment Method external US  (monitor removed)   Fetal HR (beats/min) 135   Fetal Heart Baseline Rate normal range   Fetal HR Variability moderate (amplitude range 6 to 25 bpm)   Fetal HR Accelerations present   Fetal HR Decelerations absent     Difficulty tracing as baby was active and pt has anterior placenta. HR audible as RN was bedside. Pt was able to talk through contractions, a mild tightening was palpable on the fundus.

## 2020-08-16 NOTE — PLAN OF CARE
Data: Patient assessed in the Birthplace for uterine contractions.  Cervical exam posterior, dilated to 1.5cm and effaced 30-50%.  Membranes intact.  Occasional Contractions with uterine irritability present.  Action:  Presumed adequate fetal oxygenation documented (see flow record). Discharge instructions reviewed.  Patient instructed to report change in fetal movement, vaginal leaking of fluid or bleeding, abdominal pain, or any concerns related to the pregnancy to her nurse/physician.    Response: Orders to discharge home per Whit Bermudez.  Patient verbalized understanding of education and verbalized agreement with plan. Discharged to home at 0045.

## 2020-08-16 NOTE — PROVIDER NOTIFICATION
08/16/20 0018 08/16/20 0019   Provider Notification   Provider Name/Title Dr. George Bermudez    Method of Notification Electronic Page Phone     Updated MD on pt arrival and status, pt reports contractions or tightening every 3-4 minutes lasting 2-3 minutes. Pt placed on monitor, occasional contractions noted with uterine irritability. SVE unchanged from clinic when she had her membranes swept, 1.5/40/-5.      MD gave orders to discharge home, give 100mg oral vistaril if pt wants for sleep.

## 2020-08-16 NOTE — DISCHARGE INSTRUCTIONS
Discharge Instruction for Undelivered Patients      You were seen for: Labor Assessment  We Consulted: Dr. George Bermudez  You had (Test or Medicine):fetal and uterine monitoring     Diet:   Drink 8 to 12 glasses of liquids (milk, juice, water) every day.     Activity:  Call your doctor or nurse midwife if your baby is moving less than usual.     Call your provider if you notice:  Swelling in your face or increased swelling in your hands or legs.  Headaches that are not relieved by Tylenol (acetaminophen).  Changes in your vision (blurring: seeing spots or stars.)  Nausea (sick to your stomach) and vomiting (throwing up).   Weight gain of 5 pounds or more per week.  Heartburn that doesn't go away.  Signs of bladder infection: pain when you urinate (use the toilet), need to go more often and more urgently.  The bag of carias (rupture of membranes) breaks, or you notice leaking in your underwear.  Bright red blood in your underwear.  Abdominal (lower belly) or stomach pain.  For first baby: Contractions (tightening) less than 5 minutes apart for one hour or more.  Second (plus) baby: Contractions (tightening) less than 10 minutes apart and getting stronger.  *If less than 34 weeks: Contractions (tightenings) more than 6 times in one hour.  Increase or change in vaginal discharge (note the color and amount)      Follow-up:  As scheduled in the clinic.

## 2020-08-16 NOTE — TELEPHONE ENCOUNTER
Pt is 37 weeks pregnant, recently had membrane sweep was dilated at 2cm and feels she may be laboring.  Contractions are currently: every 3-4 minutes, lasting 2-3 minutes for that last 1 hour  Pt is sweating, feels very hot  Multipara     Care Disposition: Go to L&D now. Care advice given per protocol. Pt verbalizes understanding. Pt's  will drive her to L&D now.     Lotus Trotter RN Triage Nurse Advisor 10:42 PM    Reason for Disposition    [1] History of prior delivery (multipara) AND [2] contractions < 10 minutes apart AND [3] present 1 hour    Protocols used: PREGNANCY - LABOR-A-

## 2020-08-16 NOTE — PLAN OF CARE
Data: Patient presented to Birthplace: 8/15/2020 11:31 PM.  Reason for maternal/fetal assessment is uterine contractions. Patient reports tightening/contraction every 3-4 minutes lasting 2-3 minutes.  Patient is a .  Prenatal record reviewed. Pregnancy  has been complicated by has been uncomplicated.  Gestational Age 37w4d. VSS. Fetal movement present. Patient denies leaking of vaginal fluid/rupture of membranes, vaginal bleeding, abdominal pain, pelvic pressure, nausea, vomiting, headache, visual disturbances, epigastric or URQ pain, significant edema. Support person is present.   Action: Verbal consent for EFM. Triage assessment completed. Bill of rights reviewed.  Response: Patient verbalized agreement with plan. Will contact Dr Whit Bermudez with update and further orders.

## 2020-08-18 ENCOUNTER — TELEPHONE (OUTPATIENT)
Dept: OBGYN | Facility: CLINIC | Age: 25
End: 2020-08-18

## 2020-08-18 ENCOUNTER — ANESTHESIA (OUTPATIENT)
Dept: OBGYN | Facility: CLINIC | Age: 25
End: 2020-08-18
Payer: COMMERCIAL

## 2020-08-18 ENCOUNTER — ANESTHESIA EVENT (OUTPATIENT)
Dept: OBGYN | Facility: CLINIC | Age: 25
End: 2020-08-18
Payer: COMMERCIAL

## 2020-08-18 ENCOUNTER — HOSPITAL ENCOUNTER (INPATIENT)
Facility: CLINIC | Age: 25
LOS: 2 days | Discharge: HOME | End: 2020-08-20
Attending: OBSTETRICS & GYNECOLOGY | Admitting: OBSTETRICS & GYNECOLOGY
Payer: COMMERCIAL

## 2020-08-18 LAB
ABO + RH BLD: NORMAL
ABO + RH BLD: NORMAL
BLD GP AB SCN SERPL QL: NORMAL
BLOOD BANK CMNT PATIENT-IMP: NORMAL
HGB BLD-MCNC: 10.8 G/DL (ref 11.7–15.7)
LABORATORY COMMENT REPORT: NORMAL
SARS-COV-2 RNA SPEC QL NAA+PROBE: NEGATIVE
SARS-COV-2 RNA SPEC QL NAA+PROBE: NORMAL
SPECIMEN EXP DATE BLD: NORMAL
SPECIMEN SOURCE: NORMAL
SPECIMEN SOURCE: NORMAL

## 2020-08-18 PROCEDURE — 86780 TREPONEMA PALLIDUM: CPT | Performed by: OBSTETRICS & GYNECOLOGY

## 2020-08-18 PROCEDURE — 86850 RBC ANTIBODY SCREEN: CPT | Performed by: OBSTETRICS & GYNECOLOGY

## 2020-08-18 PROCEDURE — 0KQM0ZZ REPAIR PERINEUM MUSCLE, OPEN APPROACH: ICD-10-PCS | Performed by: OBSTETRICS & GYNECOLOGY

## 2020-08-18 PROCEDURE — 12000000 ZZH R&B MED SURG/OB

## 2020-08-18 PROCEDURE — 72200001 ZZH LABOR CARE VAGINAL DELIVERY SINGLE

## 2020-08-18 PROCEDURE — 25800030 ZZH RX IP 258 OP 636: Performed by: ANESTHESIOLOGY

## 2020-08-18 PROCEDURE — U0003 INFECTIOUS AGENT DETECTION BY NUCLEIC ACID (DNA OR RNA); SEVERE ACUTE RESPIRATORY SYNDROME CORONAVIRUS 2 (SARS-COV-2) (CORONAVIRUS DISEASE [COVID-19]), AMPLIFIED PROBE TECHNIQUE, MAKING USE OF HIGH THROUGHPUT TECHNOLOGIES AS DESCRIBED BY CMS-2020-01-R: HCPCS | Performed by: OBSTETRICS & GYNECOLOGY

## 2020-08-18 PROCEDURE — 86900 BLOOD TYPING SEROLOGIC ABO: CPT | Performed by: OBSTETRICS & GYNECOLOGY

## 2020-08-18 PROCEDURE — 85018 HEMOGLOBIN: CPT | Performed by: OBSTETRICS & GYNECOLOGY

## 2020-08-18 PROCEDURE — 25000125 ZZHC RX 250: Performed by: OBSTETRICS & GYNECOLOGY

## 2020-08-18 PROCEDURE — 25000132 ZZH RX MED GY IP 250 OP 250 PS 637: Performed by: OBSTETRICS & GYNECOLOGY

## 2020-08-18 PROCEDURE — 40000671 ZZH STATISTIC ANESTHESIA CASE

## 2020-08-18 PROCEDURE — 59400 OBSTETRICAL CARE: CPT | Performed by: OBSTETRICS & GYNECOLOGY

## 2020-08-18 PROCEDURE — 10907ZC DRAINAGE OF AMNIOTIC FLUID, THERAPEUTIC FROM PRODUCTS OF CONCEPTION, VIA NATURAL OR ARTIFICIAL OPENING: ICD-10-PCS | Performed by: OBSTETRICS & GYNECOLOGY

## 2020-08-18 PROCEDURE — 25000128 H RX IP 250 OP 636: Performed by: ANESTHESIOLOGY

## 2020-08-18 PROCEDURE — 37000011 ZZH ANESTHESIA WARD SERVICE

## 2020-08-18 PROCEDURE — 00HU33Z INSERTION OF INFUSION DEVICE INTO SPINAL CANAL, PERCUTANEOUS APPROACH: ICD-10-PCS | Performed by: ANESTHESIOLOGY

## 2020-08-18 PROCEDURE — 86901 BLOOD TYPING SEROLOGIC RH(D): CPT | Performed by: OBSTETRICS & GYNECOLOGY

## 2020-08-18 PROCEDURE — 3E0R3BZ INTRODUCTION OF ANESTHETIC AGENT INTO SPINAL CANAL, PERCUTANEOUS APPROACH: ICD-10-PCS | Performed by: ANESTHESIOLOGY

## 2020-08-18 RX ORDER — NALOXONE HYDROCHLORIDE 0.4 MG/ML
.1-.4 INJECTION, SOLUTION INTRAMUSCULAR; INTRAVENOUS; SUBCUTANEOUS
Status: DISCONTINUED | OUTPATIENT
Start: 2020-08-18 | End: 2020-08-20 | Stop reason: HOSPADM

## 2020-08-18 RX ORDER — ONDANSETRON 2 MG/ML
4 INJECTION INTRAMUSCULAR; INTRAVENOUS EVERY 6 HOURS PRN
Status: DISCONTINUED | OUTPATIENT
Start: 2020-08-18 | End: 2020-08-20 | Stop reason: HOSPADM

## 2020-08-18 RX ORDER — SODIUM CHLORIDE, SODIUM LACTATE, POTASSIUM CHLORIDE, CALCIUM CHLORIDE 600; 310; 30; 20 MG/100ML; MG/100ML; MG/100ML; MG/100ML
INJECTION, SOLUTION INTRAVENOUS CONTINUOUS
Status: DISCONTINUED | OUTPATIENT
Start: 2020-08-18 | End: 2020-08-19

## 2020-08-18 RX ORDER — METHYLERGONOVINE MALEATE 0.2 MG/ML
200 INJECTION INTRAVENOUS
Status: DISCONTINUED | OUTPATIENT
Start: 2020-08-18 | End: 2020-08-20 | Stop reason: HOSPADM

## 2020-08-18 RX ORDER — IBUPROFEN 800 MG/1
800 TABLET, FILM COATED ORAL
Status: COMPLETED | OUTPATIENT
Start: 2020-08-18 | End: 2020-08-18

## 2020-08-18 RX ORDER — OXYCODONE AND ACETAMINOPHEN 5; 325 MG/1; MG/1
1 TABLET ORAL
Status: DISCONTINUED | OUTPATIENT
Start: 2020-08-18 | End: 2020-08-20 | Stop reason: HOSPADM

## 2020-08-18 RX ORDER — OXYTOCIN 10 [USP'U]/ML
10 INJECTION, SOLUTION INTRAMUSCULAR; INTRAVENOUS
Status: DISCONTINUED | OUTPATIENT
Start: 2020-08-18 | End: 2020-08-20 | Stop reason: HOSPADM

## 2020-08-18 RX ORDER — NALBUPHINE HYDROCHLORIDE 20 MG/ML
2.5-5 INJECTION, SOLUTION INTRAMUSCULAR; INTRAVENOUS; SUBCUTANEOUS EVERY 6 HOURS PRN
Status: DISCONTINUED | OUTPATIENT
Start: 2020-08-18 | End: 2020-08-20 | Stop reason: HOSPADM

## 2020-08-18 RX ORDER — TERBUTALINE SULFATE 1 MG/ML
0.25 INJECTION, SOLUTION SUBCUTANEOUS
Status: DISCONTINUED | OUTPATIENT
Start: 2020-08-18 | End: 2020-08-20 | Stop reason: HOSPADM

## 2020-08-18 RX ORDER — FENTANYL CITRATE 50 UG/ML
50-100 INJECTION, SOLUTION INTRAMUSCULAR; INTRAVENOUS
Status: DISCONTINUED | OUTPATIENT
Start: 2020-08-18 | End: 2020-08-20 | Stop reason: HOSPADM

## 2020-08-18 RX ORDER — ONDANSETRON 4 MG/1
4 TABLET, ORALLY DISINTEGRATING ORAL EVERY 6 HOURS PRN
Status: DISCONTINUED | OUTPATIENT
Start: 2020-08-18 | End: 2020-08-20 | Stop reason: HOSPADM

## 2020-08-18 RX ORDER — OXYTOCIN/0.9 % SODIUM CHLORIDE 30/500 ML
100-340 PLASTIC BAG, INJECTION (ML) INTRAVENOUS CONTINUOUS PRN
Status: COMPLETED | OUTPATIENT
Start: 2020-08-18 | End: 2020-08-18

## 2020-08-18 RX ORDER — EPHEDRINE SULFATE 50 MG/ML
5 INJECTION, SOLUTION INTRAMUSCULAR; INTRAVENOUS; SUBCUTANEOUS
Status: DISCONTINUED | OUTPATIENT
Start: 2020-08-18 | End: 2020-08-20 | Stop reason: HOSPADM

## 2020-08-18 RX ORDER — TRANEXAMIC ACID 10 MG/ML
1 INJECTION, SOLUTION INTRAVENOUS EVERY 30 MIN PRN
Status: DISCONTINUED | OUTPATIENT
Start: 2020-08-18 | End: 2020-08-20

## 2020-08-18 RX ORDER — ACETAMINOPHEN 325 MG/1
650 TABLET ORAL EVERY 4 HOURS PRN
Status: DISCONTINUED | OUTPATIENT
Start: 2020-08-18 | End: 2020-08-20 | Stop reason: HOSPADM

## 2020-08-18 RX ORDER — OXYTOCIN/0.9 % SODIUM CHLORIDE 30/500 ML
1-24 PLASTIC BAG, INJECTION (ML) INTRAVENOUS CONTINUOUS
Status: DISCONTINUED | OUTPATIENT
Start: 2020-08-18 | End: 2020-08-19

## 2020-08-18 RX ORDER — CARBOPROST TROMETHAMINE 250 UG/ML
250 INJECTION, SOLUTION INTRAMUSCULAR
Status: DISCONTINUED | OUTPATIENT
Start: 2020-08-18 | End: 2020-08-20 | Stop reason: HOSPADM

## 2020-08-18 RX ADMIN — Medication: at 16:29

## 2020-08-18 RX ADMIN — Medication 2 MILLI-UNITS/MIN: at 19:13

## 2020-08-18 RX ADMIN — SODIUM CHLORIDE, POTASSIUM CHLORIDE, SODIUM LACTATE AND CALCIUM CHLORIDE 500 ML: 600; 310; 30; 20 INJECTION, SOLUTION INTRAVENOUS at 15:43

## 2020-08-18 RX ADMIN — ACETAMINOPHEN 650 MG: 325 TABLET, FILM COATED ORAL at 22:12

## 2020-08-18 RX ADMIN — Medication 340 ML/HR: at 20:55

## 2020-08-18 RX ADMIN — IBUPROFEN 800 MG: 800 TABLET, FILM COATED ORAL at 21:18

## 2020-08-18 NOTE — TELEPHONE ENCOUNTER
She needs to go to L&D, or she can come see me first and we can send her if needed.    Marlen Orellana MD

## 2020-08-18 NOTE — ANESTHESIA PROCEDURE NOTES
Procedure note : epidural catheter  Staff -   Anesthesiologist:  Noman Salomon MD      Performed By: anesthesiologist    Referred By: bonnie    Pre-Procedure  Performed by Noman Salomon MD  Referred by bonnie  Location: OB    Procedure Times:8/18/2020 4:11 PM and 8/18/2020 4:24 PM  Pre-Anesthestic Checklist: patient identified, IV checked, site marked, risks and benefits discussed, informed consent, monitors and equipment checked, pre-op evaluation and at physician/surgeon's request    Timeout  Correct Patient: Yes   Correct Procedure: Yes   Correct Site: Yes   Correct Laterality: Yes and N/A   Correct Position: Yes   Site Marked: Yes, N/A   .   Procedure Documentation    .    Procedure: epidural catheter, .       .  .        Assessment/Narrative  .  .  . Comments:  Pre-Procedure  Performed by Noman Salomon MD  Location: OB.      PreAnesthestic Checklist: patient identified, IV checked, risks and benefits discussed, informed consent obtained, monitors and equipment checked, pre-op evaluation and at physician/surgeon's request.    Timeout   Correct Patient: Yes  Correct Procedure: Epidural catheter placement  Correct Site: Yes   Correct Position: Yes    Procedure Documentation  Procedure:   Epidural catheter block for Labor    Patient currently in labor and she and OBMD request a labor epidural to control her labor pains. Patient was interviewed and examined. Procedure and risks including but not limited to bleeding, infection, nerve injury, paralysis, PDPH, and inadequate block requiring intervention discussed with patient. Questions answered. This epidural is to be placed in anticipation of vaginal delivery.  She consents to the epidural procedure.  Time-out was performed.  I or my partners remain immediately available for management of any issues or complications and will monitor at appropriate intervals.  Procedure: Patient sitting. Betadine prep x 3. Sterile drape applied.  Mask and gloves  used.  Lidocaine 1%  local infiltration at L 3-4.  17 G. Tuohy needle at L3-4 by loss of resistance into epidural space.  No CSF, paresthesia or blood. 1.5 % Lidocaine with 1:200,000 Epinephrine 5cc test dose. Epidural catheter inserted w/o resistance to 5 cm in epidural space. Then 0.25% bupivicaine 10 cc with NS 5 cc.  Aspiration negative for blood and CSF.   Negative for neuro change, paresthesia or symptoms of intravascular injection or intrathecal injection.  Infusion orders written and infusion started.    Noman Salomon MD

## 2020-08-18 NOTE — PROVIDER NOTIFICATION
20 1520   Provider Notification   Provider Name/Title Dr Orellana   Method of Notification Phone   Request Evaluate - Remote   Notification Reason Patient Arrived;SVE   Updated MD of pt's arrival, , 37.6, tena since 200 today, sve /3 with michael. MD gave admitting orders and stated pt may have labor epidural prn. Marilin June RN on 2020 at 3:34 PM

## 2020-08-18 NOTE — PLAN OF CARE
Data: [facility] presented to [facility]: 2020  2:58 PM.  Reason for maternal/fetal assessment is uterine contractions. Patient reports that she has been tena since 0200 today and her  Contractions have gotten stronger since.  Patient is a .  Prenatal record reviewed. Pregnancy has been uncomplicated..  Gestational Age 37w6d. VSS. Fetal movement present. Patient denies leaking of vaginal fluid/rupture of membranes, vaginal bleeding, pelvic pressure, nausea, vomiting, headache, visual disturbances, epigastric or URQ pain, significant edema. Support person is present.   Action: Verbal consent for EFM. Triage assessment completed. Bill of rights reviewed.  Response: Patient verbalized agreement with plan. Will contact Dr Marlen Orellana with update and further orders.

## 2020-08-18 NOTE — PROGRESS NOTES
Patient comfortable with epidural.  BBOW on exam: AROM with copious clear fluid noted.  Cervix then 8/80/-2 station.  FHTs cat I.    Plan:  Recheck in 1 hour, sooner if feeling pressure or urge to push.  Anticipate vaginal delivery.  Plan discussed with the patient and she agrees.    Marlen Orellana MD

## 2020-08-18 NOTE — TELEPHONE ENCOUNTER
37w6d    Pt was in L&D this past Sat 8/15.  She was having cxts and discharged the same day.    Today she has had cxts that are 5 min apart just like on Sat.  But they are more intense.  They have been intense like this since 2am.      She was told by L&D to call and get some meds to help with these cxts.      She does not feel the urge to push.    Please advise.     Nataly STEPHENS R.N.

## 2020-08-18 NOTE — H&P
No significant change in general health status based on examination of the patient, review of Nursing Admission Database and prenatal record.    Reviewed records and spoke with OB RN.  Came in in spontaneous labor, with contractions every 3-5 minutes since 2am.  No leakage of fluid.  Exam is 5/90/BBOW.  Would like epidural.    Plan:  -epidural as needed.  -AROM when comfortable.  -anticipate vaginal delivery.    Marlen Orellana MD

## 2020-08-18 NOTE — TELEPHONE ENCOUNTER
Spoke with pt, no change since last call.  She would like to come to the clinic and go up if needed.    Advised to go to L&D if she should have any change from now until they get to the clinic.    Nataly STEPHENS R.N.

## 2020-08-18 NOTE — ANESTHESIA PREPROCEDURE EVALUATION
Anesthesia Pre-Procedure Evaluation    Patient: Ana Mauricio   MRN: [id number] : 1995          Preoperative Diagnosis: * No surgery found *        Past Medical History:   Diagnosis Date     Anemia      Attention deficit disorder without mention of hyperactivity     not on meds now, tried several     Cellulitis and abscess of trunk 11/10/2017     Hyperlipidemia LDL goal <160 2011     Past Surgical History:   Procedure Laterality Date     NO HISTORY OF SURGERY       Anesthesia Evaluation       history and physical reviewed .             ROS/MED HX    ENT/Pulmonary:  - neg pulmonary ROS     Neurologic:  - neg neurologic ROS     Cardiovascular:  - neg cardiovascular ROS       METS/Exercise Tolerance:     Hematologic:         Musculoskeletal:         GI/Hepatic:     (+) GERD       Renal/Genitourinary:         Endo:         Psychiatric:         Infectious Disease:         Malignancy:         Other:                     neg OB ROS            Physical Exam  Normal systems: cardiovascular and pulmonary    Airway   Mallampati: II    Dental     Cardiovascular       Pulmonary             Lab Results   Component Value Date    WBC 10.6 2020    HGB 10.8 (L) 2020    HCT 33.4 (L) 2020     2020     2017    POTASSIUM 3.9 2017    CHLORIDE 104 2017    CO2 27 2017    BUN 10 2017    CR 0.66 2017    GLC 97 2019    DELMAR 9.2 2017    ALBUMIN 3.9 2017    PROTTOTAL 7.9 2017    ALT 27 2017    AST 23 2017    ALKPHOS 95 2017    BILITOTAL 0.3 2017    TSH 2.41 2019    T4 0.96 2011       Preop Vitals  BP Readings from Last 3 Encounters:   20 123/84   08/15/20 128/80   20 132/80    Pulse Readings from Last 3 Encounters:   08/15/20 90   20 92   20 82      Resp Readings from Last 3 Encounters:   20 16   08/15/20 18   20 18    SpO2 Readings from Last 3 Encounters:  "  08/15/20 99%   11/30/18 99%   12/16/17 98%      Temp Readings from Last 1 Encounters:   08/18/20 99  F (37.2  C) (Oral)    Ht Readings from Last 1 Encounters:   08/15/20 1.702 m (5' 7\")      Wt Readings from Last 1 Encounters:   08/15/20 117 kg (258 lb)    Estimated body mass index is 40.41 kg/m  as calculated from the following:    Height as of 8/15/20: 1.702 m (5' 7\").    Weight as of 8/15/20: 117 kg (258 lb).       Anesthesia Plan      History & Physical Review      ASA Status:  2 .  OB Epidural Asa: 2            Postoperative Care      Consents  Anesthetic plan, risks, benefits and alternatives discussed with:  Patient..                 Noman Salomon MD                    .  "

## 2020-08-18 NOTE — PROVIDER NOTIFICATION
08/18/20 1710   Provider Notification   Provider Name/Title Dr. Orellana   Method of Notification At Bedside   Request Evaluate in Person   Notification Reason Membrane Status;Status Update     MD at bedside.  FHT and contraction pattern evaluated.  MD okay with contractions being difficult to  since the patient is not on pitocin and FHT are category I.  AROM, clear fluid.  SVE per MD - 8 cm, 80%, -2 station.  MD would like the patient to be rechecked at 1810.  Will update MD as necessary.

## 2020-08-19 PROBLEM — Z36.89 ENCOUNTER FOR TRIAGE IN PREGNANT PATIENT: Status: RESOLVED | Noted: 2018-09-04 | Resolved: 2020-08-19

## 2020-08-19 PROBLEM — Z34.00 PRENATAL CARE, FIRST PREGNANCY: Status: RESOLVED | Noted: 2018-10-31 | Resolved: 2020-08-19

## 2020-08-19 LAB — T PALLIDUM AB SER QL: NONREACTIVE

## 2020-08-19 PROCEDURE — 25000132 ZZH RX MED GY IP 250 OP 250 PS 637: Performed by: OBSTETRICS & GYNECOLOGY

## 2020-08-19 PROCEDURE — 12000000 ZZH R&B MED SURG/OB

## 2020-08-19 RX ORDER — HYDROCORTISONE 2.5 %
CREAM (GRAM) TOPICAL 3 TIMES DAILY PRN
Status: DISCONTINUED | OUTPATIENT
Start: 2020-08-19 | End: 2020-08-20 | Stop reason: HOSPADM

## 2020-08-19 RX ORDER — BISACODYL 10 MG
10 SUPPOSITORY, RECTAL RECTAL DAILY PRN
Status: DISCONTINUED | OUTPATIENT
Start: 2020-08-20 | End: 2020-08-20 | Stop reason: HOSPADM

## 2020-08-19 RX ORDER — AMOXICILLIN 250 MG
1 CAPSULE ORAL 2 TIMES DAILY
Status: DISCONTINUED | OUTPATIENT
Start: 2020-08-19 | End: 2020-08-20 | Stop reason: HOSPADM

## 2020-08-19 RX ORDER — MISOPROSTOL 200 UG/1
800 TABLET ORAL
Status: DISCONTINUED | OUTPATIENT
Start: 2020-08-19 | End: 2020-08-20 | Stop reason: HOSPADM

## 2020-08-19 RX ORDER — IBUPROFEN 600 MG/1
600 TABLET, FILM COATED ORAL EVERY 6 HOURS PRN
Qty: 30 TABLET | Refills: 0 | Status: SHIPPED | OUTPATIENT
Start: 2020-08-19 | End: 2020-11-13

## 2020-08-19 RX ORDER — OXYTOCIN/0.9 % SODIUM CHLORIDE 30/500 ML
340 PLASTIC BAG, INJECTION (ML) INTRAVENOUS CONTINUOUS PRN
Status: DISCONTINUED | OUTPATIENT
Start: 2020-08-19 | End: 2020-08-20 | Stop reason: HOSPADM

## 2020-08-19 RX ORDER — MODIFIED LANOLIN
OINTMENT (GRAM) TOPICAL
Status: DISCONTINUED | OUTPATIENT
Start: 2020-08-19 | End: 2020-08-20 | Stop reason: HOSPADM

## 2020-08-19 RX ORDER — OXYTOCIN/0.9 % SODIUM CHLORIDE 30/500 ML
100 PLASTIC BAG, INJECTION (ML) INTRAVENOUS CONTINUOUS
Status: DISCONTINUED | OUTPATIENT
Start: 2020-08-19 | End: 2020-08-19

## 2020-08-19 RX ORDER — AMOXICILLIN 250 MG
2 CAPSULE ORAL 2 TIMES DAILY
Status: DISCONTINUED | OUTPATIENT
Start: 2020-08-19 | End: 2020-08-20 | Stop reason: HOSPADM

## 2020-08-19 RX ORDER — OXYTOCIN 10 [USP'U]/ML
10 INJECTION, SOLUTION INTRAMUSCULAR; INTRAVENOUS
Status: DISCONTINUED | OUTPATIENT
Start: 2020-08-19 | End: 2020-08-20 | Stop reason: HOSPADM

## 2020-08-19 RX ORDER — NALOXONE HYDROCHLORIDE 0.4 MG/ML
.1-.4 INJECTION, SOLUTION INTRAMUSCULAR; INTRAVENOUS; SUBCUTANEOUS
Status: DISCONTINUED | OUTPATIENT
Start: 2020-08-19 | End: 2020-08-20 | Stop reason: HOSPADM

## 2020-08-19 RX ORDER — ACETAMINOPHEN 325 MG/1
650 TABLET ORAL EVERY 4 HOURS PRN
Status: DISCONTINUED | OUTPATIENT
Start: 2020-08-19 | End: 2020-08-20 | Stop reason: HOSPADM

## 2020-08-19 RX ORDER — PRENATAL VIT/IRON FUM/FOLIC AC 27MG-0.8MG
1 TABLET ORAL DAILY
Status: DISCONTINUED | OUTPATIENT
Start: 2020-08-19 | End: 2020-08-20 | Stop reason: HOSPADM

## 2020-08-19 RX ORDER — IBUPROFEN 800 MG/1
800 TABLET, FILM COATED ORAL EVERY 6 HOURS PRN
Status: DISCONTINUED | OUTPATIENT
Start: 2020-08-19 | End: 2020-08-20 | Stop reason: HOSPADM

## 2020-08-19 RX ORDER — TRANEXAMIC ACID 10 MG/ML
1 INJECTION, SOLUTION INTRAVENOUS EVERY 30 MIN PRN
Status: DISCONTINUED | OUTPATIENT
Start: 2020-08-19 | End: 2020-08-20 | Stop reason: HOSPADM

## 2020-08-19 RX ADMIN — IBUPROFEN 800 MG: 800 TABLET, FILM COATED ORAL at 07:49

## 2020-08-19 RX ADMIN — ACETAMINOPHEN 650 MG: 325 TABLET, FILM COATED ORAL at 11:32

## 2020-08-19 RX ADMIN — DOCUSATE SODIUM AND SENNOSIDES 1 TABLET: 8.6; 5 TABLET ORAL at 22:28

## 2020-08-19 RX ADMIN — IBUPROFEN 800 MG: 800 TABLET, FILM COATED ORAL at 19:41

## 2020-08-19 RX ADMIN — DOCUSATE SODIUM AND SENNOSIDES 1 TABLET: 8.6; 5 TABLET ORAL at 07:49

## 2020-08-19 RX ADMIN — IBUPROFEN 800 MG: 800 TABLET, FILM COATED ORAL at 13:29

## 2020-08-19 RX ADMIN — PRENATAL VITAMINS-IRON FUMARATE 27 MG IRON-FOLIC ACID 0.8 MG TABLET 1 TABLET: at 07:49

## 2020-08-19 NOTE — PLAN OF CARE
Pt able to get some rest between cares w/   rooming-in.  Able to void w/o difficulty.  Denies pain at this time. Spouse present and rooming-in. Patients mobililty level scored using the bedside mobility assistance tool (BMAT). Patient is at a mobility level test number: 4. Mobility equipment used: none required. Required assist of 0 staff members. Further use of BMAT scoring not required.

## 2020-08-19 NOTE — DISCHARGE SUMMARY
[facility] Postpartum/Discharge Note    S:  Patient without complaints.  Minimal lochia.  Wants to go home tonight if baby okay for discharge.    O:  Blood pressure 125/82, pulse 68, temperature 98.7  F (37.1  C), temperature source Oral, resp. rate 18, last menstrual period 11/21/2019, SpO2 100 %, unknown if currently breastfeeding.        Urine output adequate        Abdomen - Fundus firm, at umbilicus, nontender        Extremities - No calf tenderness    A:   Postpartum Day# 1, s/p Vaginal delivery - doing well    P:  1)  Discharge home tonight if baby okay for discharge.  If baby needs to stay, cancel discharge.        2)  F/U 6 weeks w/ Primary OB        3)  Discharge meds: Diomedes Dubose MD

## 2020-08-19 NOTE — PLAN OF CARE
Patients mobililty level scored using the bedside mobility assistance tool (BMAT). Patient is at a mobility level test number: 3. Mobility equipment used: marky mackay. Required assist of 1 staff members. Further use of BMAT scoring required.

## 2020-08-19 NOTE — PLAN OF CARE
Data: Ana Mauricio transferred to room 453 via wheelchair at 0030. Baby transferred via parent's arms.  Action: Receiving unit notified of transfer: Yes. Patient and family notified of room change. Report given to Nadine AGUILERA RN at 0035. Belongings sent to receiving unit. Accompanied by Registered Nurse. Oriented patient to surroundings. Call light within reach. ID bands double-checked with receiving RN.  Response: Patient tolerated transfer and is stable.

## 2020-08-19 NOTE — DOWNTIME EVENT NOTE
The EMR was down for 1.5 hours on 8/19/2020.    Nadine Biswas RN was responsible for completing the paper charting during this time period.     The following information was re-entered into the system by Nadine Biswas RN: Flowsheet data and Orders    The following information will remain in the paper chart: n/a    Nadine Biswas RN  8/19/2020

## 2020-08-19 NOTE — PLAN OF CARE
Patient stable this morning. Independent with self and  cares. Saline lock removed. Attempting to breastfeed frequently. No concerns at this time.

## 2020-08-19 NOTE — LACTATION NOTE
This note was copied from a baby's chart.  LC visit. Infant is difficult to latch and has many attempts.  LC assisted with positioning and mouth exercises. Latch obtained after several minutes of attempting.  Her baby is mostly disinterested in sucking, but did eventually latch with use of the shield.  Her nipples invert with stimulation so shield use was recommended every time for now.  LC will assess again tomorrow. RN updated. If problems persist, LC would recommend initiation of pumping at 24 hours.

## 2020-08-19 NOTE — L&D DELIVERY NOTE
VAGINAL DELIVERY PROCEDURE NOTE    PREOPERATIVE DIAGNOSIS:  1. Intrauterine pregnancy at 37w6d  2. Active labor    POSTOPERATIVE DIAGNOSIS:   1. Status post   2. Second degree laceration  3. Shoulder dystocia, mild (20s)    PROCEDURE DATE: 2020    PROCEDURE:   1.   2. Laceration repair      STAFF SURGEON: Marlen Orellana MD    ANESTHESIA: epidural    COMPLICATIONS: shoulder dystocia, resolved in 20 secs    QBL: 150 cc.     SPECIMENS: cord blood    FINDINGS:  male infant, Apgar scores of 8 and 9.  Delivered in OA presentation. Placenta with 3 vessel cord. Meconium: none.      INDICATIONS:  This is a 25 year old  with intrauterine pregnancy at 37w6d who presented to Labor and Delivery in active labor. Her pregnancy has been complicated by class II obesity.     PROCEDURE:  The patient was complete and pushing. Infant's head was delivered atraumatically over perineum in the OA position. Nuchal cord: none. Anterior shoulder (right) was impacted, and with prompt Khurram, suprapubic pressure, and Hernandez maneuver, the shoulder dystocia was resolved by rotating the left shoulder to the anterior position, where it easily delivered followed by the rest of the baby. Infant vigorous and crying. Drying and resuscitative measures performed. Delayed cord clamping was not performed prior to cutting. Cord gases were not obtained. Cord blood was collected. Pitocin in IV fluid was administered per protocol. The placenta delivered spontaneously intact with 3 vessel cord and revealed normal anatomy. Uterine massage was performed and the fundus was found to be firm. Vagina, cervix, and perineum were inspected for lacerations. Second degree laceration was noted and repaired with 3.0 vicryl rapide. All counts were correct. Mom and baby stable in room.    Primary OB: Dr. David Orellana MD  9:11 PM  2020

## 2020-08-19 NOTE — ANESTHESIA POSTPROCEDURE EVALUATION
Patient: Ana Mauricio    * No procedures listed *    Diagnosis:* No pre-op diagnosis entered *  Diagnosis Additional Information: No value filed.    Anesthesia Type:  Epidural    Note:  Anesthesia Post Evaluation    Patient location during evaluation: Bedside     Anesthetic complications: None    Comments: I or my partner was immediately available for management of this patient during epidural analgesia infusion.   Patient post labor epidural catheter, doing well.  She reports good pain relief with epidural catheter.  She denies ongoing sensorimotor block, headache, fever, chills or other complaints.  All questions answered, understanding voiced.  She will have us contacted for any questions or problems.        Last vitals:  Vitals:    08/18/20 2237 08/19/20 0400 08/19/20 0746   BP: 123/68 125/82 120/85   Pulse:  68 74   Resp:  18 16   Temp:  98.7  F (37.1  C) 98.7  F (37.1  C)   SpO2:            Electronically Signed By: Tyler Rojas MD  August 19, 2020  8:29 AM

## 2020-08-19 NOTE — PROVIDER NOTIFICATION
08/18/20 2038   Provider Notification   Provider Name/Title Dr. Orellana   Method of Notification At Bedside   Request Attend Delivery

## 2020-08-20 VITALS
OXYGEN SATURATION: 98 % | SYSTOLIC BLOOD PRESSURE: 114 MMHG | RESPIRATION RATE: 16 BRPM | TEMPERATURE: 98.9 F | DIASTOLIC BLOOD PRESSURE: 65 MMHG | HEART RATE: 70 BPM

## 2020-08-20 LAB — HGB BLD-MCNC: 9.6 G/DL (ref 11.7–15.7)

## 2020-08-20 PROCEDURE — 36415 COLL VENOUS BLD VENIPUNCTURE: CPT | Performed by: OBSTETRICS & GYNECOLOGY

## 2020-08-20 PROCEDURE — 85018 HEMOGLOBIN: CPT | Performed by: OBSTETRICS & GYNECOLOGY

## 2020-08-20 PROCEDURE — 25000132 ZZH RX MED GY IP 250 OP 250 PS 637: Performed by: OBSTETRICS & GYNECOLOGY

## 2020-08-20 RX ADMIN — IBUPROFEN 800 MG: 800 TABLET, FILM COATED ORAL at 07:44

## 2020-08-20 RX ADMIN — PRENATAL VITAMINS-IRON FUMARATE 27 MG IRON-FOLIC ACID 0.8 MG TABLET 1 TABLET: at 11:16

## 2020-08-20 RX ADMIN — ACETAMINOPHEN 650 MG: 325 TABLET, FILM COATED ORAL at 07:01

## 2020-08-20 RX ADMIN — DOCUSATE SODIUM AND SENNOSIDES 2 TABLET: 8.6; 5 TABLET ORAL at 07:48

## 2020-08-20 RX ADMIN — IBUPROFEN 800 MG: 800 TABLET, FILM COATED ORAL at 01:51

## 2020-08-20 RX ADMIN — ACETAMINOPHEN 650 MG: 325 TABLET, FILM COATED ORAL at 11:16

## 2020-08-20 NOTE — PLAN OF CARE
Discharge instructions completed.  Patient states she understands all discharge instructions and all her questions have been answered.  Verbalizes when she needs to return to clinic for follow up for herself and baby.  She is caring for herself and her baby independently.  Prescriptions reviewed and med given.  Postpartum depression symptoms reviewed and encouraged frequent review of depression scale. Discharge within the hour with spouse to home.

## 2020-08-20 NOTE — DISCHARGE SUMMARY
Postpartum Progress Note  Ana Mauricio  [id number]    Subjective:   Patiet doing well, no concerns. Pain well controlled with POs. Denies nausea and vomiting. Ambulating without difficulty. Adequate PO intake. Breast feeding going well. Looking forward to going home.     Objective:  /65   Pulse 70   Temp 98.9  F (37.2  C) (Oral)   Resp 16   LMP 2019   SpO2 98%   Breastfeeding Unknown   General: resting comfortably, in NAD  Heart: regular rate, well perfused  Lungs: non-labored breathing, no cough    Labs:  Hemoglobin   Date Value Ref Range Status   2020 9.6 (L) 11.7 - 15.7 g/dL Final   2020 10.8 (L) 11.7 - 15.7 g/dL Final       Assessment/Plan:  25 year old  who is PPD#2 s/p .  Currently stable and doing well  - Routine post-partum cares  - Heme: staple, mild acute on chronic anemia. Continue home iron.  - Pain: continue tylenol, ibuprofen, ice packs, hot packs as needed  - Baby: in room doing well  - Dispo: d/c to home today.    Karen Ledezma MD  OB/GYN

## 2020-08-20 NOTE — LACTATION NOTE
This note was copied from a baby's chart.  LC visit. Infant has been nursing well and often since visit yesterday.  Ana feels confident with shield use, positioning, and feeding cues.  She is aware she may call prn.  Plan for circumcision and discharge today.  LC reviewed the importance of continuing to feed often and on demand.

## 2020-08-20 NOTE — DISCHARGE INSTRUCTIONS
The immediate postpartum period:    It is normal to feel increased fatigue, sleeplessness, and emotionality in the first few weeks after delivery. Your goal is to care for you and baby during this time, allow others to help with house maintenance, grocery shopping, and other chores. You will feel much better if you can sleep for at least one 4-hour stretch each day or night. Try to have someone assist with at least 1 feeding per day to make this possible.   During this time its a great idea to get outside, take a short walk, and have a few minutes to yourself every day.     Breast feeding:    If you are breast feeding, you continue to need about 500 extra calories each day and a lot more water. Stay hydrated and well fed!  Milk usually comes in by day 3-7 and can cause very uncomfortable, firm breasts and leaking. A low grade fever (below 101) can accompany your milk coming in. If you have 1 breast significantly larger or more red or tender than the other please contact the clinic. It is ok to continue breast feeding even if you think you have a breast infection. Tylenol and ibuprofen are safe during breast feeding.     Postpartum pain control:    You will likely experience cramping for 1-2 weeks.   You can take up to 3 tabs of ibuprofen (600mg) every 6 hours as needed for pain.  You can additionally take 1-2 tabs of tylenol (325-650mg regular strength 500-1000mg extra strength), every 6 hours for additional pain control as needed.  It is best to alternate your medications so you are taking something every 3 hours if you are having continued pain.    If you have vaginal pain you can take sitz baths 1-2x/day. Fill the tub with 2-3 inches of warm water and soak the perineal and vaginal area for 10 minutes. Ice or warm packs can also be applied for comfort.    Constipation  It is common to experience constipation following delivery. To ease constipation you need to drink lots of water and eat foods high in fiber,  including All-bran cereal or Raisin Bran cereal, prunes, and whole grain foods.    You may use the following products to help with constipation:    1. Stool softeners such as metamucil or benefiber  2. senna 1-2 times daily  3. Fiber supplements  4. miralax (over the counter).  5. Dulcolax    Please be sure to keep adequately hydrated; 6-8 8oz glasses daily, more if needed to compensate for exercise, sweating, etc.      More specific dosing can be found below:    - Metamucil 28g daily PO with 8oz of water  - senna-docusate 8.6-50 MG per tablet PO 1 tablet, Oral, 2 TIMES DAILY, Start with 1 tablet PO BID, reduce to 1 tablet daily when having daily BMs. Stop for loose stools.  - docusate sodium (COLACE) capsule 100 mg, Oral, 2 TIMES DAILY, To prevent constipation. Hold for loose stools.  - bisacodyl (DULCOLAX) suppository 10 mg, Rectal, DAILY PRN, constipation, Hold for loose stools.     Please contact the clinic with any questions.  Please schedule a follow up appointment with your primary OB/Gyn provider in 6 weeks and sooner if you have any problems such as mood instability, unexpected bleeding or pain, or concerns for mastitis. If you plan on a procedure for birth control at your 6 week visit (IUD, nexplanon) please ask for a 30 minute appointment.     If you are looking for parenting groups or postpartum support try [email or web address], [email or web address], or [facility]. If you think you might have postpartum depression please call the clinic right away to be seen.    You can contact the clinic via [employer] or call [phone].     Take care,   Karen Ledezma MD        Postpartum Vaginal Delivery Instructions    Activity       Ask family and friends for help when you need it.    Do not place anything in your vagina for 6 weeks.    You are not restricted on other activities, but take it easy for a few weeks to allow your body to recover from delivery.  You are able to do any activities you feel up to that point.    No driving  until you have stopped taking your pain medications (usually two weeks after delivery).     Call your health care provider if you have any of these symptoms:       Increased pain, swelling, redness, or fluid around your stiches from an episiotomy or perineal tear.    A fever above 100.4 F (38 C) with or without chills when placing a thermometer under your tongue.    You soak a sanitary pad with blood within 1 hour, or you see blood clots larger than a golf ball.    Bleeding that lasts more than 6 weeks.    Vaginal discharge that smells bad.    Severe pain, cramping or tenderness in your lower belly area.    A need to urinate more frequently (use the toilet more often), more urgently (use the toilet very quickly), or it burns when you urinate.    Nausea and vomiting.    Redness, swelling or pain around a vein in your leg.    Problems breastfeeding or a red or painful area on your breast.    Chest pain and cough or are gasping for air.    Problems coping with sadness, anxiety, or depression.  If you have any concerns about hurting yourself or the baby, call your provider immediately.     You have questions or concerns after you return home.     Keep your hands clean:  Always wash your hands before touching your perineal area and stitches.  This helps reduce your risk of infection.  If your hands aren't dirty, you may use an alcohol hand-rub to clean your hands. Keep your nails clean and short.

## 2020-08-20 NOTE — PLAN OF CARE
Data: Vital signs within normal limits. Postpartum checks within normal limits, see flow record. Patient eating and drinking normally. Patient able to empty bladder independently and is up ambulating. No apparent signs of infection.  2nd degree laceration  healing well, using ice and meds with relief. Patient performing self cares and is able to care for infant.  Action: Patient medicated during the shift for pain and cramping. See MAR. Patient reassessed within 1 hour after each medication and pain was improved - patient stated she was comfortable. Patient education done about safe infant sleep, basic infant cares, breastfeeding cues and satiety, breast pumping, self cares and  pain management. See flow record.  Response: Positive attachment behaviors observed with infant. Support person  present and attentive to patient and infant   Plan: Continue to prepare for discharge. Anticipate discharge on 08/20/20.

## 2020-09-02 ENCOUNTER — TELEPHONE (OUTPATIENT)
Dept: OBGYN | Facility: CLINIC | Age: 25
End: 2020-09-02

## 2020-09-02 NOTE — TELEPHONE ENCOUNTER
Patient calls to say there will be disability forms coming to the back nurses station fax soon for Dr. Orellana to fill out.

## 2020-10-06 ENCOUNTER — PRENATAL OFFICE VISIT (OUTPATIENT)
Dept: OBGYN | Facility: CLINIC | Age: 25
End: 2020-10-06
Payer: COMMERCIAL

## 2020-10-06 VITALS
WEIGHT: 248 LBS | SYSTOLIC BLOOD PRESSURE: 120 MMHG | BODY MASS INDEX: 38.92 KG/M2 | HEART RATE: 72 BPM | HEIGHT: 67 IN | DIASTOLIC BLOOD PRESSURE: 76 MMHG

## 2020-10-06 DIAGNOSIS — Z12.4 PAP SMEAR FOR CERVICAL CANCER SCREENING: ICD-10-CM

## 2020-10-06 PROCEDURE — 99207 PR POST PARTUM EXAM: CPT | Performed by: OBSTETRICS & GYNECOLOGY

## 2020-10-06 PROCEDURE — G0145 SCR C/V CYTO,THINLAYER,RESCR: HCPCS | Performed by: OBSTETRICS & GYNECOLOGY

## 2020-10-06 ASSESSMENT — PATIENT HEALTH QUESTIONNAIRE - PHQ9: SUM OF ALL RESPONSES TO PHQ QUESTIONS 1-9: 2

## 2020-10-06 ASSESSMENT — MIFFLIN-ST. JEOR: SCORE: 1907.55

## 2020-10-06 NOTE — PROGRESS NOTES
"Ana is here for a 6-week postpartum checkup.    She had a  of a viable boy, weight 8 pounds 4 oz., with no complications. Date of delivery was 20. Since delivery, she has not been breast feeding.  She has no signs of infection, bleeding or other complications.  She is not pregnant.  We discussed contraception and she has chosen condoms.      Post partum tubal: No  History of Gestational Diabetes? No  Type of Delivery:  Vaginal  Feeding Method:  Formula  If initiated breast feeding and stopped, how long did you breast feed?:  1-2 weeks    REVIEW OF SYSTEMS:  All negative.  Contraception Plan: condoms    EXAM:  /76   Pulse 72   Ht 1.702 m (5' 7\")   Wt 112.5 kg (248 lb)   LMP 2019   Breastfeeding No   BMI 38.84 kg/m    HEENT: grossly normal.  ABDOMEN: soft, non tender,  without masses rebound, guarding or tenderness.  PELVIC:  External genitalia; normal without lesion, repair well healed.   Vagina: normal mucosa and rugae, no discharge.  Cervix: multiparous, well healed, without lesion.  Uterus: non pregnant in size, firm , mobile, no lesions,     Normal shape, position and consistency  Adnexa: non tender, without masses.  EXTREMITIES:  warm to touch  NEUROLOGIC: grossly normal.    ASSESSMENT:   6-week postpartum exam after .    PLAN:    Contraception methods discussed  Flu shot recommended  Follow up in 1 year.  One-hour glucose tolerance test needed? No  Condoms for contraception.    Marlen Orellana MD    "

## 2020-10-06 NOTE — NURSING NOTE
"Chief Complaint   Patient presents with     Postpartum Care       Initial /76   Pulse 72   Ht 1.702 m (5' 7\")   Wt 112.5 kg (248 lb)   LMP 2019   Breastfeeding No   BMI 38.84 kg/m   Estimated body mass index is 38.84 kg/m  as calculated from the following:    Height as of this encounter: 1.702 m (5' 7\").    Weight as of this encounter: 112.5 kg (248 lb).  BP completed using cuff size: regular    Questioned patient about current smoking habits.  Pt. has never smoked.          The following HM Due: pap smear      The following patient reported/Care Every where data was sent to:  P ABSTRACT QUALITY INITIATIVES [[id number]]  Hyun Gautam LPN               "

## 2020-10-10 LAB
COPATH REPORT: NORMAL
PAP: NORMAL

## 2020-11-13 ENCOUNTER — OFFICE VISIT (OUTPATIENT)
Dept: FAMILY MEDICINE | Facility: CLINIC | Age: 25
End: 2020-11-13
Payer: COMMERCIAL

## 2020-11-13 VITALS
SYSTOLIC BLOOD PRESSURE: 110 MMHG | HEIGHT: 67 IN | HEART RATE: 90 BPM | OXYGEN SATURATION: 98 % | DIASTOLIC BLOOD PRESSURE: 73 MMHG | BODY MASS INDEX: 39.55 KG/M2 | WEIGHT: 252 LBS | TEMPERATURE: 98.3 F | RESPIRATION RATE: 18 BRPM

## 2020-11-13 DIAGNOSIS — N92.6 IRREGULAR MENSES: ICD-10-CM

## 2020-11-13 DIAGNOSIS — L08.9 SKIN PUSTULE: Primary | ICD-10-CM

## 2020-11-13 DIAGNOSIS — Z30.011 ENCOUNTER FOR INITIAL PRESCRIPTION OF CONTRACEPTIVE PILLS: ICD-10-CM

## 2020-11-13 DIAGNOSIS — E66.01 MORBID OBESITY (H): ICD-10-CM

## 2020-11-13 DIAGNOSIS — F32.1 MAJOR DEPRESSIVE DISORDER, SINGLE EPISODE, MODERATE (H): ICD-10-CM

## 2020-11-13 LAB — HCG SERPL QL: NEGATIVE

## 2020-11-13 PROCEDURE — 36415 COLL VENOUS BLD VENIPUNCTURE: CPT | Performed by: NURSE PRACTITIONER

## 2020-11-13 PROCEDURE — 84703 CHORIONIC GONADOTROPIN ASSAY: CPT | Performed by: NURSE PRACTITIONER

## 2020-11-13 PROCEDURE — 99214 OFFICE O/P EST MOD 30 MIN: CPT | Performed by: NURSE PRACTITIONER

## 2020-11-13 RX ORDER — ACETAMINOPHEN AND CODEINE PHOSPHATE 120; 12 MG/5ML; MG/5ML
0.35 SOLUTION ORAL DAILY
Qty: 84 TABLET | Refills: 3 | Status: SHIPPED | OUTPATIENT
Start: 2020-11-13 | End: 2021-04-02 | Stop reason: ALTCHOICE

## 2020-11-13 ASSESSMENT — MIFFLIN-ST. JEOR: SCORE: 1925.69

## 2020-11-13 ASSESSMENT — PAIN SCALES - GENERAL: PAINLEVEL: NO PAIN (0)

## 2020-11-13 NOTE — PROGRESS NOTES
"Subjective     Ana Mauricio is a 25 year old female who presents to clinic today for the following health issues:    HPI         Concern - left breast   Onset: two weeks ago   Description: two red spot , lump   Intensity: mild  Progression of Symptoms:  same  Accompanying Signs & Symptoms: itching   Previous history of similar problem: none   Precipitating factors:        Worsened by: feels like it numb   Alleviating factors:        Improved by: nothing   Two weeks ago had a boil on the left breast, went away, continues to have 2 lumps in that area that are tender. Has a history of recurrent boils of the underarm.      Irregular menses:  Baby born in 8/2020, 8/30 had her period for 10 days, 9/30 had her period for 2 weeks, 10/17-10/25 heavy period, 10/30-11/6 light period.  3 home pregnancy tests:  2 positive and 1 negative.    Sexually active with male partner, using condoms, is not breast feeding.     Depression:  PHQ 9 of 2, denies thoughts of harming self or others.   Review of Systems   CONSTITUTIONAL: NEGATIVE for fever, chills, change in weight  BREAST: see HPI  : see HPI    :Objective    /73 (BP Location: Right arm, Patient Position: Sitting, Cuff Size: Adult Large)   Pulse 90   Temp 98.3  F (36.8  C) (Oral)   Resp 18   Ht 1.702 m (5' 7\")   Wt 114.3 kg (252 lb)   LMP 10/30/2020   SpO2 98%   BMI 39.47 kg/m    Body mass index is 39.47 kg/m .  Physical Exam   GENERAL: healthy, alert and no distress  RESP: lungs clear to auscultation - no rales, rhonchi or wheezes  BREAST:   Right breast:  normal without masses, tenderness or nipple discharge and no palpable axillary masses or adenopathy  Left breast:  At 6:00 a 5 mm pustule present, non draining, tender to touch, no redness or warmth, remainder of the breast without tenderness, no nipple discharge and no palpable axillary masses or adenopathy  CV: regular rate and rhythm, normal S1 S2, no S3 or S4, no murmur, click or rub, no peripheral " "edema  PSYCH: mentation appears normal, affect normal/bright          Assessment & Plan     Skin pustule:  Left breast at 6:00, discussed applying warm pack 2-3 times per day, if it does not resolve or enlarges discussed return for incision and drainage.     Irregular menses: informed Ana of negative HCG  - HCG qualitative, Blood (MYX180)    Encounter for initial prescription of contraceptive pills: Discussed various forms of birth control including IUD, depo, OCP.  Ana chose to start on below, prefers to have an OCP without estrogen due to mood swings on previous pills.    Discussed risks and benefits of medication, need to take on a daily basis at the same time, start packet on Sunday.  If misses a pill take 2 the next day.  Continue to use condoms since does not protect against STD's.    - norethindrone (MICRONOR) 0.35 MG tablet  Dispense: 84 tablet; Refill: 3    Morbid obesity (H): plans to work on weight loss.     Major depression:   PHQ 9 of 2, denies thoughts of harming self or others.        BMI:   Estimated body mass index is 39.47 kg/m  as calculated from the following:    Height as of this encounter: 1.702 m (5' 7\").    Weight as of this encounter: 114.3 kg (252 lb).   Weight management plan: Discussed healthy diet and exercise guidelines         Follow up in 1 year, sooner as needed.     Susan Haase, APRN CNP  [facility]    "

## 2020-11-13 NOTE — PROGRESS NOTES
Subjective     Ana Mauricio is a 25 year old female who presents to clinic today for the following health issues:    HPI         {SUPERLIST (Optional):918591}  {additonal problems for provider to add (Optional):478801}    Review of Systems   {ROS COMP (Optional):177373}      Objective    There were no vitals taken for this visit.  There is no height or weight on file to calculate BMI.  Physical Exam   {Exam List (Optional):041240}    {Diagnostic Test Results (Optional):388068}        {PROVIDER CHARTING PREFERENCE:041597}

## 2020-11-22 ENCOUNTER — HEALTH MAINTENANCE LETTER (OUTPATIENT)
Age: 25
End: 2020-11-22

## 2020-11-23 ENCOUNTER — E-VISIT (OUTPATIENT)
Dept: URGENT CARE | Facility: URGENT CARE | Age: 25
End: 2020-11-23
Payer: COMMERCIAL

## 2020-11-23 DIAGNOSIS — Z20.822 SUSPECTED COVID-19 VIRUS INFECTION: Primary | ICD-10-CM

## 2020-11-23 PROCEDURE — 99421 OL DIG E/M SVC 5-10 MIN: CPT | Performed by: PHYSICIAN ASSISTANT

## 2020-11-23 NOTE — PATIENT INSTRUCTIONS
Dear Ana Mauricio,    Your symptoms show that you may have coronavirus (COVID-19). This illness can cause fever, cough and trouble breathing. Many people get a mild case and get better on their own. Some people can get very sick.    Will I be tested for COVID-19?  We would like to test you for Covid-19 virus. I have placed orders for this test.     For all employees or close contacts (except [employer] and [employer] - see below), go to your [employer] home page and scroll down to the section that says  You have an appointment that needs to be scheduled  and click the large green button that says  Schedule Now  and follow the steps to find the next available opening.     If you are unable to complete these steps or if you cannot find any available times, please call [phone] to schedule employee testing.       [employer] employees or close contacts, please call [phone].   [employer] [place]) employees or close contacts call [phone].    When it's time for your COVID test:  Stay at least 6 feet away from others. (If someone will drive you to your test, stay in the backseat, as far away from the  as you can.)  Cover your mouth and nose with a mask, tissue or washcloth.  Go straight to the testing site. Don't make any stops on the way there or back.    Starting now:     Do not go to work.   o If you receive a negative COVID-19 test result and were NOT exposed to someone with a known positive COVID-19 test, you can return to work once you're free of fever for 24 hours without fever-reducing medication and your symptoms are improving or resolved.  o If you receive a positive COVID-19 test result, you must be cleared by Employee Occupational Health and Safety to return to work.   o If you were exposed to someone who has tested positive for COVID-19, you can return to work 14 days after your last contact with the positive individual, provided you do not have symptoms at all during that time. In some cases,  "your manager may ask you to come back sooner than 14 days.     During this time, don't leave the house except for testing or medical care.  o Stay in your own room, even for meals. Use your own bathroom if you can.  o Stay away from others in your home. No hugging, kissing or shaking hands. No visitors.  o Don't go to work, school or anywhere else.    Clean \"high touch\" surfaces often (doorknobs, counters, handles, etc.). Use a household cleaning spray or wipes. You'll find a full list of  on the EPA website: www.epa.gov/pesticide-registration/list-n-disinfectants-use-against-sars-cov-2.    Cover your mouth and nose with a mask, tissue or washcloth to avoid spreading germs.    Wash your hands and face often. Use soap and water.    People in these groups are at risk for severe illness due to COVID-19:  o People 65 years and older  o People who live in a nursing home or long-term care facility  o People with chronic disease (lung, heart, cancer, diabetes, kidney, liver, immunologic)  o People who have a weakened immune system, including those who:  - Are in cancer treatment  - Take medicine that weakens the immune system, such as corticosteroids  - Had a bone marrow or organ transplant  - Have an immune deficiency  - Have poorly controlled HIV or AIDS  - Are obese (body mass index of 40 or higher)  - Smoke regularly      Caregivers should wear gloves while washing dishes, handling laundry and cleaning bedrooms and bathrooms.    Use caution when washing and drying laundry: Don't shake dirty laundry, and use the warmest water setting that you can.    For more tips, go to www.cdc.gov/coronavirus/2019-ncov/downloads/10Things.pdf.    Sign up for [employer]. We know it's scary to hear that you might have COVID-19. We want to track your symptoms to make sure you're okay over the next 2 weeks. Please look for an email from [employer]--this is a free, online program that we'll use to keep in touch. To sign up, " follow the link in the email you will receive. Learn more at [email or web address]    How can I take care of myself?    Get lots of rest. Drink extra fluids (unless a doctor has told you not to)    Take Tylenol (acetaminophen) for fever or pain. If you have liver or kidney problems, ask your family doctor if it's okay to take Tylenol.  Adults can take either:    650 mg (two 325 mg pills) every 4 to 6 hours, or     1,000 mg (two 500 mg pills) every 8 hours as needed.    Note: Don't take more than 3,000 mg in one day. Acetaminophen is found in many medicines (both prescribed and over-the-counter medicines). Read all labels to be sure you don't take too much.  For children, check the Tylenol bottle for the right dose. The dose is based on the child's age or weight.    If you have other health problems (like cancer, heart failure, an organ transplant or severe kidney disease): Call your specialty clinic if you don't feel better in the next 2 days.    Know when to call 911. Emergency warning signs include:  Trouble breathing or shortness of breath  Pain or pressure in the chest that doesn't go away  Feeling confused like you haven't felt before, or not being able to wake up  Bluish-colored lips or face    Where can I get more information?    [facility] - About COVID-19: [email or web address]  CDC - What to Do If You're Sick: www.cdc.gov/coronavirus/2019-ncov/about/steps-when-sick.html  November 23, 2020    RE:  Ana Mauricio                                                                                                                                                       [id number] [facility] [address]        To whom it may concern:    I evaluated Ana Mauricio on 11/23/20. Ana Mauricio should be excused from work/school.     Do not go to work.      If you receive a negative COVID-19 test result and were NOT exposed to someone with a known positive COVID-19 test, you can  return to work once you're free of fever for 24 hours without fever-reducing medication and your symptoms are improving or resolved.    If you receive a positive COVID-19 test result, you must be cleared by Employee Occupational Health and Safety to return to work.     If you were exposed to someone who has tested positive for COVID-19, you can return to work 14 days after your last contact with the positive individual, provided you do not have symptoms at all during that time. In some cases, your manager may ask you to come back sooner than 14 days.       Sincerely,  Jostin Kincaid PA-C

## 2020-11-24 ENCOUNTER — OFFICE VISIT (OUTPATIENT)
Dept: URGENT CARE | Facility: URGENT CARE | Age: 25
End: 2020-11-24
Attending: PHYSICIAN ASSISTANT
Payer: COMMERCIAL

## 2020-11-24 DIAGNOSIS — Z20.822 SUSPECTED COVID-19 VIRUS INFECTION: ICD-10-CM

## 2020-11-24 LAB
SARS-COV-2 RNA SPEC QL NAA+PROBE: NORMAL
SPECIMEN SOURCE: NORMAL

## 2020-11-24 PROCEDURE — U0003 INFECTIOUS AGENT DETECTION BY NUCLEIC ACID (DNA OR RNA); SEVERE ACUTE RESPIRATORY SYNDROME CORONAVIRUS 2 (SARS-COV-2) (CORONAVIRUS DISEASE [COVID-19]), AMPLIFIED PROBE TECHNIQUE, MAKING USE OF HIGH THROUGHPUT TECHNOLOGIES AS DESCRIBED BY CMS-2020-01-R: HCPCS | Performed by: PHYSICIAN ASSISTANT

## 2020-11-25 LAB
LABORATORY COMMENT REPORT: ABNORMAL
SARS-COV-2 RNA SPEC QL NAA+PROBE: POSITIVE
SPECIMEN SOURCE: ABNORMAL

## 2020-12-21 ENCOUNTER — MEDICAL CORRESPONDENCE (OUTPATIENT)
Dept: HEALTH INFORMATION MANAGEMENT | Facility: CLINIC | Age: 25
End: 2020-12-21

## 2021-03-22 ENCOUNTER — MEDICAL CORRESPONDENCE (OUTPATIENT)
Dept: HEALTH INFORMATION MANAGEMENT | Facility: CLINIC | Age: 26
End: 2021-03-22

## 2021-04-02 ENCOUNTER — OFFICE VISIT (OUTPATIENT)
Dept: FAMILY MEDICINE | Facility: CLINIC | Age: 26
End: 2021-04-02
Payer: COMMERCIAL

## 2021-04-02 VITALS
OXYGEN SATURATION: 98 % | HEIGHT: 67 IN | RESPIRATION RATE: 16 BRPM | WEIGHT: 245 LBS | BODY MASS INDEX: 38.45 KG/M2 | HEART RATE: 82 BPM | SYSTOLIC BLOOD PRESSURE: 128 MMHG | DIASTOLIC BLOOD PRESSURE: 85 MMHG | TEMPERATURE: 98.3 F

## 2021-04-02 DIAGNOSIS — Z30.41 ENCOUNTER FOR SURVEILLANCE OF CONTRACEPTIVE PILLS: Primary | ICD-10-CM

## 2021-04-02 DIAGNOSIS — E66.01 MORBID OBESITY (H): ICD-10-CM

## 2021-04-02 DIAGNOSIS — F32.1 MAJOR DEPRESSIVE DISORDER, SINGLE EPISODE, MODERATE (H): ICD-10-CM

## 2021-04-02 PROCEDURE — 99214 OFFICE O/P EST MOD 30 MIN: CPT | Performed by: NURSE PRACTITIONER

## 2021-04-02 RX ORDER — NORETHINDRONE ACETATE AND ETHINYL ESTRADIOL .02; 1 MG/1; MG/1
1 TABLET ORAL DAILY
Qty: 84 TABLET | Refills: 3 | Status: SHIPPED | OUTPATIENT
Start: 2021-04-02 | End: 2022-05-27

## 2021-04-02 ASSESSMENT — ANXIETY QUESTIONNAIRES
4. TROUBLE RELAXING: NOT AT ALL
7. FEELING AFRAID AS IF SOMETHING AWFUL MIGHT HAPPEN: NOT AT ALL
1. FEELING NERVOUS, ANXIOUS, OR ON EDGE: NOT AT ALL
GAD7 TOTAL SCORE: 0
5. BEING SO RESTLESS THAT IT IS HARD TO SIT STILL: NOT AT ALL
7. FEELING AFRAID AS IF SOMETHING AWFUL MIGHT HAPPEN: NOT AT ALL
3. WORRYING TOO MUCH ABOUT DIFFERENT THINGS: NOT AT ALL
GAD7 TOTAL SCORE: 0
GAD7 TOTAL SCORE: 0
6. BECOMING EASILY ANNOYED OR IRRITABLE: NOT AT ALL
2. NOT BEING ABLE TO STOP OR CONTROL WORRYING: NOT AT ALL

## 2021-04-02 ASSESSMENT — PATIENT HEALTH QUESTIONNAIRE - PHQ9
SUM OF ALL RESPONSES TO PHQ QUESTIONS 1-9: 1
SUM OF ALL RESPONSES TO PHQ QUESTIONS 1-9: 1
10. IF YOU CHECKED OFF ANY PROBLEMS, HOW DIFFICULT HAVE THESE PROBLEMS MADE IT FOR YOU TO DO YOUR WORK, TAKE CARE OF THINGS AT HOME, OR GET ALONG WITH OTHER PEOPLE: NOT DIFFICULT AT ALL

## 2021-04-02 ASSESSMENT — MIFFLIN-ST. JEOR: SCORE: 1893.94

## 2021-04-02 NOTE — PROGRESS NOTES
Assessment & Plan     Encounter for surveillance of contraceptive pills:  Will change from progesterone only to microgestin, is no longer breast feeding.      - norethindrone-ethinyl estradiol (MICROGESTIN 1/20) 1-20 MG-MCG tablet; Take 1 tablet by mouth daily    Morbid obesity (H): will refer to endocrinology to discuss weight loss options  - ENDOCRINOLOGY ADULT REFERRAL    Major depressive disorder, single episode, moderate (H): PHQ 9 of 1, in remission.    Follow up in 1 year, sooner as needed.      Susan Haase, APRN CNP  [facility] KAILEY Perez is a 25 year old who presents for the following health issues     Abnormal Bleeding Problem    History of Present Illness       She eats 2-3 servings of fruits and vegetables daily.She consumes 0 sweetened beverage(s) daily.She exercises with enough effort to increase her heart rate 9 or less minutes per day.  She exercises with enough effort to increase her heart rate 3 or less days per week. She is missing 3 dose(s) of medications per week.  She is not taking prescribed medications regularly due to remembering to take.     Review of Systems   Genitourinary: Positive for menstrual problem.    On OCP (Progesterone only) periods are lasting 4 days, changing pad every 5-6 hours, intense cramps with hot flashes.  Taking ibuprofen for cramping, taking 2 ibuprofen and 3 tylenol once a day which decreases the cramps for short period of time.   In the past tried estrogen containing OCP's felt they increased moodiness, is willing to try them again  LMP 3/20/2021. Denies family history of DVT, denies self history of DVT/migraine headache, is a nonsmoker.     Obesity:  Has tried various forms of weight loss without success, exercising, eating healthy.    Depression:  PHQ 9 of 1.   Constitutional, HEENT, cardiovascular, pulmonary, gi and gu systems are negative, except as otherwise noted.      Objective    /85 (BP Location: Right arm,  "Patient Position: Chair, Cuff Size: Adult Large)   Pulse 82   Temp 98.3  F (36.8  C) (Oral)   Resp 16   Ht 1.702 m (5' 7\")   Wt 111.1 kg (245 lb)   SpO2 98%   BMI 38.37 kg/m    Body mass index is 38.37 kg/m .  Physical Exam   GENERAL: healthy, alert and no distress  RESP: lungs clear to auscultation - no rales, rhonchi or wheezes  CV: regular rate and rhythm, normal S1 S2,   PSYCH: mentation appears normal, affect normal/bright          Answers for HPI/ROS submitted by the patient on 4/2/2021   Chronic problems general questions HPI Form  If you checked off any problems, how difficult have these problems made it for you to do your work, take care of things at home, or get along with other people?: Not difficult at all  PHQ9 TOTAL SCORE: 1  PAOLO 7 TOTAL SCORE: 0    "

## 2021-04-03 ASSESSMENT — ANXIETY QUESTIONNAIRES: GAD7 TOTAL SCORE: 0

## 2021-04-03 ASSESSMENT — PATIENT HEALTH QUESTIONNAIRE - PHQ9: SUM OF ALL RESPONSES TO PHQ QUESTIONS 1-9: 1

## 2021-04-05 NOTE — LETTER
July 7, 2020      Ana Mauricio  [address] [address] [address]        To Whom It May Concern,      Ana Mauricio is to have work hours shortened from 8 hours to 6 hours due to increased contractions and cramping while she is working.        Sincerely,        Ashley Hernandez, DO          
[facility]  [address], [address]  [place] [address]  [phone]        July 7, 2020    Re: Ana Mauricio  [address] [facility] [address] [address]            To Whom It May Concern,      Ana Mauricio is to have work hours shortened from 8 hours to 6 hours due to increased contractions and cramping while she is working.  This should start on July 8, 2020 and go until August 5, 2020.  Please call with any questions.      Sincerely,            Ashley Hernandez, DO  
[facility]  [address], [address]  [place] [address]  [phone]        July 7, 2020    Re: Ana Mauricio  [address] [address] [facility] [address]            To Whom It May Concern,      Ana Mauricio is to have work hours shortened from 8 hours to 6 hours due to increased contractions and cramping while she is working.  This should start on July 8, 2020 and go until August 5, 2020.  Please call with any questions.      Sincerely,        Ashley Hernandez, DO  
None

## 2021-04-20 NOTE — PROGRESS NOTES
Ana is a 25 year old who is being evaluated via a billable telephone visit.      What phone number would you like to be contacted at? [phone]  How would you like to obtain your AVS? Italia    CC: Obesity.     HPI:   Patient presents for management of obesity.   She began gaining weight in college.   She was able to loose some of the weight.   After her first child in 2018, she has not been able to loose weight.   Second child born in 8/2020. States she has not lost any weight since delivery.     She tried Herba life supplements w/o effect.   Also tried low carb diet and exercise 3/week w/o effect.     She is not breast feeding.   Menses have resumed and are regular.   No plans for more children at this time.     Twice a week she will exercise on a treadmill (run/walk) for 1 hour.   Currently, she is focused on limiting her rice intake to 1 cup a day and no eating after after 1800.     Breakfast - eggs.   Lunch - rice, chicken.  Dinner - soup (chicken wild rice)  Snacks - carrots, grapes.     Denies issues with emotional eating.     ROS: 10 point ROS neg other than the symptoms noted above in the HPI.    PMH:   Patient Active Problem List   Diagnosis     Attention deficit hyperactivity disorder (ADHD)     Obesity     Iron deficiency anemia     Anxiety     Eczema     Hyperlipidemia LDL goal <160     GERD (gastroesophageal reflux disease)     Family history of diabetes mellitus     Vitamin D deficiency disease     Major depressive disorder, single episode, moderate (H)     Cellulitis and abscess of trunk     Vaginal delivery     Morbid obesity (H)      Meds:  Current Outpatient Medications   Medication     norethindrone-ethinyl estradiol (MICROGESTIN 1/20) 1-20 MG-MCG tablet     No current facility-administered medications for this visit.       FHX:   No DM.   Brother is obese.     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   Non-smoker.   No alcohol.     Exam:   Gen: In NAD.     A/P:   Obesity - issues include limited  exercise, lack of calorie counts. Risks/benefits of phentermine, topamax, naltrexone, and saxenda reviewed. BMR is 1900. Given her self reported low food intake, will screen for metabolic issues.   -Goal of <1400 calories a day.   -Goal of 30 minutes of aerobic exercise daily.   -Check TSH and  salivary cortisol collection kits.      Due to the COVID 19 pandemic this visit was a telephone/video visit in order to help prevent spread of infection in this high risk patient and the general population. The patient gave verbal consent for the visit today.    I have independently reviewed and interpreted labs, imaging as indicated.     Visit Start time 0758  Visit Stop time 0819  21 minutes spent on the date of the encounter doing chart review, history and exam, documentation and further activities as noted above.   If this were a face to face visit, it would be billed as [id number].    Tim Black MD on 4/21/2021 at 8:20 AM

## 2021-04-21 ENCOUNTER — VIRTUAL VISIT (OUTPATIENT)
Dept: ENDOCRINOLOGY | Facility: CLINIC | Age: 26
End: 2021-04-21
Payer: COMMERCIAL

## 2021-04-21 DIAGNOSIS — E66.9 OBESITY WITHOUT SERIOUS COMORBIDITY, UNSPECIFIED CLASSIFICATION, UNSPECIFIED OBESITY TYPE: Primary | ICD-10-CM

## 2021-04-21 DIAGNOSIS — E66.9 OBESITY WITHOUT SERIOUS COMORBIDITY, UNSPECIFIED CLASSIFICATION, UNSPECIFIED OBESITY TYPE: ICD-10-CM

## 2021-04-21 LAB
T4 FREE SERPL-MCNC: 0.89 NG/DL (ref 0.76–1.46)
TSH SERPL DL<=0.005 MIU/L-ACNC: 4.94 MU/L (ref 0.4–4)

## 2021-04-21 PROCEDURE — 99204 OFFICE O/P NEW MOD 45 MIN: CPT | Mod: TEL | Performed by: INTERNAL MEDICINE

## 2021-04-21 PROCEDURE — 99000 SPECIMEN HANDLING OFFICE-LAB: CPT | Performed by: INTERNAL MEDICINE

## 2021-04-21 PROCEDURE — 82530 CORTISOL FREE: CPT | Mod: 90 | Performed by: INTERNAL MEDICINE

## 2021-04-21 PROCEDURE — 84443 ASSAY THYROID STIM HORMONE: CPT | Performed by: INTERNAL MEDICINE

## 2021-04-21 PROCEDURE — 84439 ASSAY OF FREE THYROXINE: CPT | Performed by: INTERNAL MEDICINE

## 2021-04-21 PROCEDURE — 36415 COLL VENOUS BLD VENIPUNCTURE: CPT | Performed by: INTERNAL MEDICINE

## 2021-04-21 NOTE — LETTER
4/21/2021         RE: Ana Mauricio  [address]  [place] [facility] [address]        Dear Colleague,    Thank you for referring your patient, Ana Mauricio, to the [facility]. Please see a copy of my visit note below.    Ana is a 25 year old who is being evaluated via a billable telephone visit.      What phone number would you like to be contacted at? [phone]  How would you like to obtain your AVS? Italia    CC: Obesity.     HPI:   Patient presents for management of obesity.   She began gaining weight in college.   She was able to loose some of the weight.   After her first child in 2018, she has not been able to loose weight.   Second child born in 8/2020. States she has not lost any weight since delivery.     She tried Herba life supplements w/o effect.   Also tried low carb diet and exercise 3/week w/o effect.     She is not breast feeding.   Menses have resumed and are regular.   No plans for more children at this time.     Twice a week she will exercise on a treadmill (run/walk) for 1 hour.   Currently, she is focused on limiting her rice intake to 1 cup a day and no eating after after 1800.     Breakfast - eggs.   Lunch - rice, chicken.  Dinner - soup (chicken wild rice)  Snacks - carrots, grapes.     Denies issues with emotional eating.     ROS: 10 point ROS neg other than the symptoms noted above in the HPI.    PMH:   Patient Active Problem List   Diagnosis     Attention deficit hyperactivity disorder (ADHD)     Obesity     Iron deficiency anemia     Anxiety     Eczema     Hyperlipidemia LDL goal <160     GERD (gastroesophageal reflux disease)     Family history of diabetes mellitus     Vitamin D deficiency disease     Major depressive disorder, single episode, moderate (H)     Cellulitis and abscess of trunk     Vaginal delivery     Morbid obesity (H)      Meds:  Current Outpatient Medications   Medication     norethindrone-ethinyl estradiol (MICROGESTIN 1/20) 1-20  MG-MCG tablet     No current facility-administered medications for this visit.       FHX:   No DM.   Brother is obese.     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   No alcohol.     Exam:   Gen: In NAD.     A/P:   Obesity - issues include limited exercise, lack of calorie counts. Risks/benefits of phentermine, topamax, naltrexone, and saxenda reviewed. BMR is 1900. Given her self reported low food intake, will screen for metabolic issues.   -Goal of <1400 calories a day.   -Goal of 30 minutes of aerobic exercise daily.   -Check TSH and  salivary cortisol collection kits.      Due to the COVID 19 pandemic this visit was a telephone/video visit in order to help prevent spread of infection in this high risk patient and the general population. The patient gave verbal consent for the visit today.    I have independently reviewed and interpreted labs, imaging as indicated.     Visit Start time 0758  Visit Stop time 0819  21 minutes spent on the date of the encounter doing chart review, history and exam, documentation and further activities as noted above.   If this were a face to face visit, it would be billed as 05609.    Tim Black MD on 4/21/2021 at 8:20 AM            Again, thank you for allowing me to participate in the care of your patient.        Sincerely,        Tim Black MD

## 2021-04-22 PROCEDURE — 82530 CORTISOL FREE: CPT | Mod: 90 | Performed by: INTERNAL MEDICINE

## 2021-04-22 PROCEDURE — 99000 SPECIMEN HANDLING OFFICE-LAB: CPT | Performed by: INTERNAL MEDICINE

## 2021-04-23 DIAGNOSIS — E66.9 OBESITY WITHOUT SERIOUS COMORBIDITY, UNSPECIFIED CLASSIFICATION, UNSPECIFIED OBESITY TYPE: ICD-10-CM

## 2021-04-30 LAB — CORTIS SAL-MCNC: 0.04 UG/DL

## 2021-05-02 LAB — CORTIS SAL-MCNC: 0.03 UG/DL

## 2021-05-03 DIAGNOSIS — E03.8 SUBCLINICAL HYPOTHYROIDISM: Primary | ICD-10-CM

## 2021-05-03 RX ORDER — LEVOTHYROXINE SODIUM 50 UG/1
50 TABLET ORAL DAILY
Qty: 30 TABLET | Refills: 11 | Status: SHIPPED | OUTPATIENT
Start: 2021-05-03 | End: 2022-09-06

## 2021-06-02 DIAGNOSIS — E03.8 SUBCLINICAL HYPOTHYROIDISM: ICD-10-CM

## 2021-06-02 PROCEDURE — 84443 ASSAY THYROID STIM HORMONE: CPT | Performed by: INTERNAL MEDICINE

## 2021-06-02 PROCEDURE — 36415 COLL VENOUS BLD VENIPUNCTURE: CPT | Performed by: INTERNAL MEDICINE

## 2021-06-03 LAB — TSH SERPL DL<=0.005 MIU/L-ACNC: 3.56 MU/L (ref 0.4–4)

## 2021-06-04 ENCOUNTER — TELEPHONE (OUTPATIENT)
Dept: ENDOCRINOLOGY | Facility: CLINIC | Age: 26
End: 2021-06-04

## 2021-06-04 DIAGNOSIS — E03.8 SUBCLINICAL HYPOTHYROIDISM: Primary | ICD-10-CM

## 2021-06-04 NOTE — TELEPHONE ENCOUNTER
[facility]    Phone Message    May a detailed message be left on voicemail: yes     Reason for Call: Pt called, stating she received a missed call notification.      Writer reviewed and noted a lab was in place for TSH with free T4.      Pt stated this was completed on 6/2/21.  Please review and call pt to clarify.      Pt stated no message was received.     Action Taken: Message routed to:  [facility] endo    Travel Screening: Not Applicable

## 2021-06-04 NOTE — TELEPHONE ENCOUNTER
I left a message for Ana that we are trying to return her call. It looks like she had been seen in [place]. Cathy RHODES Rn

## 2021-09-17 ENCOUNTER — TELEPHONE (OUTPATIENT)
Dept: ENDOCRINOLOGY | Facility: CLINIC | Age: 26
End: 2021-09-17

## 2021-09-17 NOTE — TELEPHONE ENCOUNTER
Patient is due for labs and a follow up visit. Called and scheduled a lab and f/u visit.    Kush RHODES RN....9/17/2021 12:41 PM

## 2021-09-17 NOTE — TELEPHONE ENCOUNTER
[facility] Call Center    Phone Message    May a detailed message be left on voicemail: yes     Reason for Call: Order(s): Other:     Reason for requested: Per Patient is wanting to get a call back. Patient states she was seen 3 months ago and had lab done on her glucose. Patient is wanting to know if she is needing to have her glucose levels rechecked now that she has been on the medication Dr. Black prescribed her for 3 months. Patient is also wanting to know what patient is needing to do next. Please advise    Date needed: asap    Provider name: Wayne      Action Taken: Message routed to:  [facility] Endo    Travel Screening: Not Applicable

## 2021-09-20 ENCOUNTER — LAB (OUTPATIENT)
Dept: LAB | Facility: CLINIC | Age: 26
End: 2021-09-20
Payer: COMMERCIAL

## 2021-09-20 DIAGNOSIS — E03.8 SUBCLINICAL HYPOTHYROIDISM: ICD-10-CM

## 2021-09-20 PROCEDURE — 36415 COLL VENOUS BLD VENIPUNCTURE: CPT

## 2021-09-20 PROCEDURE — 84443 ASSAY THYROID STIM HORMONE: CPT

## 2021-09-21 LAB — TSH SERPL DL<=0.005 MIU/L-ACNC: 1.62 MU/L (ref 0.4–4)

## 2021-10-20 ENCOUNTER — VIRTUAL VISIT (OUTPATIENT)
Dept: ENDOCRINOLOGY | Facility: CLINIC | Age: 26
End: 2021-10-20
Payer: COMMERCIAL

## 2021-10-20 DIAGNOSIS — E03.8 SUBCLINICAL HYPOTHYROIDISM: Primary | ICD-10-CM

## 2021-10-20 DIAGNOSIS — E66.9 OBESITY WITHOUT SERIOUS COMORBIDITY, UNSPECIFIED CLASSIFICATION, UNSPECIFIED OBESITY TYPE: ICD-10-CM

## 2021-10-20 PROCEDURE — 99214 OFFICE O/P EST MOD 30 MIN: CPT | Mod: 95 | Performed by: INTERNAL MEDICINE

## 2021-10-20 RX ORDER — TOPIRAMATE 25 MG/1
TABLET, FILM COATED ORAL
Qty: 270 TABLET | Refills: 3 | Status: SHIPPED | OUTPATIENT
Start: 2021-10-20 | End: 2022-05-27

## 2021-10-20 NOTE — LETTER
10/20/2021         RE: Ana Mauricio  [id number]  [facility] [id number]        Dear Colleague,    Thank you for referring your patient, Ana Mauricio, to the [facility]. Please see a copy of my visit note below.    S:   Patient presents for management of obesity.   She began gaining weight in college.   She was able to loose some of the weight.   After her first child in 2018, she has not been able to loose weight.   Second child born in 8/2020. States she has not lost any weight since delivery.     She tried Herba life supplements w/o effect.   Also tried low carb diet and exercise 3/week w/o effect.     She is not breast feeding.   Menses have resumed and are regular.   No plans for more children at this time.     She has lost 7 pounds since we last spoke but has hit a plateau. Trying to exercise 2/week but has not been able to as much as she would like. She has cut down on her carb intake and trying to have more greens. No issues with hunger or cravings.       ROS: 10 point ROS neg other than the symptoms noted above in the HPI.    Exam:  GENERAL: Healthy, alert and no distress  EYES: Eyes grossly normal to inspection.  No discharge or erythema, or obvious scleral/conjunctival abnormalities.  RESP: No audible wheeze, cough, or visible cyanosis.  No visible retractions or increased work of breathing.    SKIN: Visible skin clear. No significant rash, abnormal pigmentation or lesions.  NEURO: Cranial nerves grossly intact.  Mentation and speech appropriate for age.  PSYCH: Mentation appears normal, affect normal/bright, judgement and insight intact, normal speech and appearance well-groomed.       A/P:   Obesity - issues include limited exercise, lack of calorie counts. Risks/benefits of phentermine, topamax, naltrexone, and saxenda reviewed. BMR is 1900. Given her self reported low food intake, will screen for metabolic issues.   Salivary cortisol was normal but labs did show  subclinical hypothyroidism. Started on levothyroxine.   In 10/2021, lost some weight but has now hit a plateau. Reviewed appetite suppressant options again including the new option of Wegovy. She understands that all of these options are not safe in pregnancy.   -Topamax Rx sent.   -Goal of <1400 calories a day.   -Goal of 30 minutes of aerobic exercise daily.     Hypothyroidism - Extensive discussion of thyroid hormone and normal physiology. Included was discussion of thyroid in relation to weight and energy.   -Continue levothyroxine.   -Lab in 3 months.       Start 2:01 PM   Stop 2:19 PM   Platform [facility].   Patient at home.     Tim Black MD on 10/20/2021 at 2:19 PM        Again, thank you for allowing me to participate in the care of your patient.        Sincerely,        Tim Black MD

## 2021-10-20 NOTE — PROGRESS NOTES
S:   Patient presents for management of obesity.   She began gaining weight in college.   She was able to loose some of the weight.   After her first child in 2018, she has not been able to loose weight.   Second child born in 8/2020. States she has not lost any weight since delivery.     She tried Herba life supplements w/o effect.   Also tried low carb diet and exercise 3/week w/o effect.     She is not breast feeding.   Menses have resumed and are regular.   No plans for more children at this time.     She has lost 7 pounds since we last spoke but has hit a plateau. Trying to exercise 2/week but has not been able to as much as she would like. She has cut down on her carb intake and trying to have more greens. No issues with hunger or cravings.       ROS: 10 point ROS neg other than the symptoms noted above in the HPI.    Exam:  GENERAL: Healthy, alert and no distress  EYES: Eyes grossly normal to inspection.  No discharge or erythema, or obvious scleral/conjunctival abnormalities.  RESP: No audible wheeze, cough, or visible cyanosis.  No visible retractions or increased work of breathing.    SKIN: Visible skin clear. No significant rash, abnormal pigmentation or lesions.  NEURO: Cranial nerves grossly intact.  Mentation and speech appropriate for age.  PSYCH: Mentation appears normal, affect normal/bright, judgement and insight intact, normal speech and appearance well-groomed.       A/P:   Obesity - issues include limited exercise, lack of calorie counts. Risks/benefits of phentermine, topamax, naltrexone, and saxenda reviewed. BMR is 1900. Given her self reported low food intake, will screen for metabolic issues.   Salivary cortisol was normal but labs did show subclinical hypothyroidism. Started on levothyroxine.   In 10/2021, lost some weight but has now hit a plateau. Reviewed appetite suppressant options again including the new option of Wegovy. She understands that all of these options are not safe in  pregnancy.   -Topamax Rx sent.   -Goal of <1400 calories a day.   -Goal of 30 minutes of aerobic exercise daily.     Hypothyroidism - Extensive discussion of thyroid hormone and normal physiology. Included was discussion of thyroid in relation to weight and energy.   -Continue levothyroxine.   -Lab in 3 months.       Start 2:01 PM   Stop 2:19 PM   Platform Al.   Patient at home.     Tim Black MD on 10/20/2021 at 2:19 PM

## 2021-11-14 ENCOUNTER — HEALTH MAINTENANCE LETTER (OUTPATIENT)
Age: 26
End: 2021-11-14

## 2021-11-16 NOTE — TELEPHONE ENCOUNTER
Scheduled.   Quality 431: Preventive Care And Screening: Unhealthy Alcohol Use - Screening: Patient not identified as an unhealthy alcohol user when screened for unhealthy alcohol use using a systematic screening method Quality 154 Part B: Falls: Risk Screening (Should Be Reported With Measure 155.): Patient screened for future fall risk; documentation of no falls in the past year or only one fall without injury in the past year Quality 130: Documentation Of Current Medications In The Medical Record: Current Medications Documented Quality 111:Pneumonia Vaccination Status For Older Adults: Pneumococcal Vaccination Previously Received Quality 47: Advance Care Plan: Advance Care Planning discussed and documented in the medical record; patient did not wish or was not able to name a surrogate decision maker or provide an advance care plan. Quality 226: Preventive Care And Screening: Tobacco Use: Screening And Cessation Intervention: Patient screened for tobacco use and is an ex/non-smoker Quality 154 Part A: Falls: Risk Assessment (Should Be Reported With Measure 155.): Falls risk assessment completed and documented in the past 12 months. Detail Level: Detailed Quality 155: Falls Plan Of Care: Plan of Care not Documented, Reason not Otherwise Specified Quality 110: Preventive Care And Screening: Influenza Immunization: Influenza Immunization Administered during Influenza season

## 2022-05-27 ENCOUNTER — OFFICE VISIT (OUTPATIENT)
Dept: FAMILY MEDICINE | Facility: CLINIC | Age: 27
End: 2022-05-27
Payer: COMMERCIAL

## 2022-05-27 VITALS
BODY MASS INDEX: 38.8 KG/M2 | DIASTOLIC BLOOD PRESSURE: 80 MMHG | HEART RATE: 72 BPM | SYSTOLIC BLOOD PRESSURE: 118 MMHG | OXYGEN SATURATION: 99 % | TEMPERATURE: 98.1 F | HEIGHT: 67 IN | WEIGHT: 247.2 LBS | RESPIRATION RATE: 18 BRPM

## 2022-05-27 DIAGNOSIS — E66.812 CLASS 2 OBESITY WITHOUT SERIOUS COMORBIDITY WITH BODY MASS INDEX (BMI) OF 38.0 TO 38.9 IN ADULT, UNSPECIFIED OBESITY TYPE: ICD-10-CM

## 2022-05-27 DIAGNOSIS — R53.83 FATIGUE, UNSPECIFIED TYPE: ICD-10-CM

## 2022-05-27 DIAGNOSIS — Z13.9 SCREENING FOR CONDITION: ICD-10-CM

## 2022-05-27 DIAGNOSIS — Z00.00 ROUTINE GENERAL MEDICAL EXAMINATION AT A HEALTH CARE FACILITY: Primary | ICD-10-CM

## 2022-05-27 DIAGNOSIS — E03.8 SUBCLINICAL HYPOTHYROIDISM: ICD-10-CM

## 2022-05-27 LAB
ANION GAP SERPL CALCULATED.3IONS-SCNC: 5 MMOL/L (ref 3–14)
BUN SERPL-MCNC: 14 MG/DL (ref 7–30)
CALCIUM SERPL-MCNC: 9.2 MG/DL (ref 8.5–10.1)
CHLORIDE BLD-SCNC: 106 MMOL/L (ref 94–109)
CHOLEST SERPL-MCNC: 191 MG/DL
CO2 SERPL-SCNC: 27 MMOL/L (ref 20–32)
CREAT SERPL-MCNC: 0.73 MG/DL (ref 0.52–1.04)
ERYTHROCYTE [DISTWIDTH] IN BLOOD BY AUTOMATED COUNT: 15.4 % (ref 10–15)
FASTING STATUS PATIENT QL REPORTED: ABNORMAL
GFR SERPL CREATININE-BSD FRML MDRD: >90 ML/MIN/1.73M2
GLUCOSE BLD-MCNC: 113 MG/DL (ref 70–99)
HCT VFR BLD AUTO: 39 % (ref 35–47)
HDLC SERPL-MCNC: 40 MG/DL
HGB BLD-MCNC: 12.1 G/DL (ref 11.7–15.7)
LDLC SERPL CALC-MCNC: 115 MG/DL
MCH RBC QN AUTO: 23.8 PG (ref 26.5–33)
MCHC RBC AUTO-ENTMCNC: 31 G/DL (ref 31.5–36.5)
MCV RBC AUTO: 77 FL (ref 78–100)
NONHDLC SERPL-MCNC: 151 MG/DL
PLATELET # BLD AUTO: 292 10E3/UL (ref 150–450)
POTASSIUM BLD-SCNC: 4.1 MMOL/L (ref 3.4–5.3)
RBC # BLD AUTO: 5.08 10E6/UL (ref 3.8–5.2)
SODIUM SERPL-SCNC: 138 MMOL/L (ref 133–144)
T4 FREE SERPL-MCNC: 0.92 NG/DL (ref 0.76–1.46)
TRIGL SERPL-MCNC: 182 MG/DL
TSH SERPL DL<=0.005 MIU/L-ACNC: 4.33 MU/L (ref 0.4–4)
WBC # BLD AUTO: 7.6 10E3/UL (ref 4–11)

## 2022-05-27 PROCEDURE — 99395 PREV VISIT EST AGE 18-39: CPT | Performed by: FAMILY MEDICINE

## 2022-05-27 PROCEDURE — 36415 COLL VENOUS BLD VENIPUNCTURE: CPT | Performed by: FAMILY MEDICINE

## 2022-05-27 PROCEDURE — 85027 COMPLETE CBC AUTOMATED: CPT | Performed by: FAMILY MEDICINE

## 2022-05-27 PROCEDURE — 80048 BASIC METABOLIC PNL TOTAL CA: CPT | Performed by: FAMILY MEDICINE

## 2022-05-27 PROCEDURE — 99213 OFFICE O/P EST LOW 20 MIN: CPT | Mod: 25 | Performed by: FAMILY MEDICINE

## 2022-05-27 PROCEDURE — 80061 LIPID PANEL: CPT | Performed by: FAMILY MEDICINE

## 2022-05-27 PROCEDURE — 84439 ASSAY OF FREE THYROXINE: CPT | Performed by: FAMILY MEDICINE

## 2022-05-27 PROCEDURE — 84443 ASSAY THYROID STIM HORMONE: CPT | Performed by: FAMILY MEDICINE

## 2022-05-27 RX ORDER — SYRINGE-NEEDLE,INSULIN,0.5 ML 27GX1/2"
SYRINGE, EMPTY DISPOSABLE MISCELLANEOUS
Qty: 100 EACH | Refills: 0 | Status: SHIPPED | OUTPATIENT
Start: 2022-05-27 | End: 2023-10-27

## 2022-05-27 SDOH — HEALTH STABILITY: PHYSICAL HEALTH: ON AVERAGE, HOW MANY MINUTES DO YOU ENGAGE IN EXERCISE AT THIS LEVEL?: 90 MIN

## 2022-05-27 SDOH — ECONOMIC STABILITY: TRANSPORTATION INSECURITY
IN THE PAST 12 MONTHS, HAS THE LACK OF TRANSPORTATION KEPT YOU FROM MEDICAL APPOINTMENTS OR FROM GETTING MEDICATIONS?: PATIENT DECLINED

## 2022-05-27 SDOH — ECONOMIC STABILITY: FOOD INSECURITY: WITHIN THE PAST 12 MONTHS, THE FOOD YOU BOUGHT JUST DIDN'T LAST AND YOU DIDN'T HAVE MONEY TO GET MORE.: PATIENT DECLINED

## 2022-05-27 SDOH — HEALTH STABILITY: PHYSICAL HEALTH: ON AVERAGE, HOW MANY DAYS PER WEEK DO YOU ENGAGE IN MODERATE TO STRENUOUS EXERCISE (LIKE A BRISK WALK)?: 0 DAYS

## 2022-05-27 SDOH — ECONOMIC STABILITY: FOOD INSECURITY: WITHIN THE PAST 12 MONTHS, YOU WORRIED THAT YOUR FOOD WOULD RUN OUT BEFORE YOU GOT MONEY TO BUY MORE.: PATIENT DECLINED

## 2022-05-27 SDOH — ECONOMIC STABILITY: INCOME INSECURITY: IN THE LAST 12 MONTHS, WAS THERE A TIME WHEN YOU WERE NOT ABLE TO PAY THE MORTGAGE OR RENT ON TIME?: NO

## 2022-05-27 SDOH — ECONOMIC STABILITY: INCOME INSECURITY: HOW HARD IS IT FOR YOU TO PAY FOR THE VERY BASICS LIKE FOOD, HOUSING, MEDICAL CARE, AND HEATING?: PATIENT DECLINED

## 2022-05-27 ASSESSMENT — SOCIAL DETERMINANTS OF HEALTH (SDOH)
HOW OFTEN DO YOU GET TOGETHER WITH FRIENDS OR RELATIVES?: ONCE A WEEK
IN A TYPICAL WEEK, HOW MANY TIMES DO YOU TALK ON THE PHONE WITH FAMILY, FRIENDS, OR NEIGHBORS?: ONCE A WEEK
DO YOU BELONG TO ANY CLUBS OR ORGANIZATIONS SUCH AS CHURCH GROUPS UNIONS, FRATERNAL OR ATHLETIC GROUPS, OR SCHOOL GROUPS?: NO
HOW OFTEN DO YOU ATTEND CHURCH OR RELIGIOUS SERVICES?: PATIENT DECLINED

## 2022-05-27 ASSESSMENT — ENCOUNTER SYMPTOMS
HEARTBURN: 0
FREQUENCY: 0
CONSTIPATION: 0
PALPITATIONS: 0
COUGH: 0
SORE THROAT: 0
WEAKNESS: 0
SHORTNESS OF BREATH: 0
JOINT SWELLING: 0
ABDOMINAL PAIN: 0
DYSURIA: 0
CHILLS: 0
ARTHRALGIAS: 0
NAUSEA: 0
HEADACHES: 0
HEMATOCHEZIA: 0
EYE PAIN: 0
HEMATURIA: 0
PARESTHESIAS: 0
DIARRHEA: 0
FEVER: 0
NERVOUS/ANXIOUS: 0
DIZZINESS: 0
MYALGIAS: 0
BREAST MASS: 0

## 2022-05-27 ASSESSMENT — LIFESTYLE VARIABLES
HOW OFTEN DO YOU HAVE A DRINK CONTAINING ALCOHOL: MONTHLY OR LESS
SKIP TO QUESTIONS 9-10: 1
HOW OFTEN DO YOU HAVE SIX OR MORE DRINKS ON ONE OCCASION: NEVER
HOW MANY STANDARD DRINKS CONTAINING ALCOHOL DO YOU HAVE ON A TYPICAL DAY: 1 OR 2
AUDIT-C TOTAL SCORE: 1

## 2022-05-27 NOTE — PROGRESS NOTES
SUBJECTIVE:   CC: Ana Mauricio is an 26 year old woman who presents for preventive health visit.     In clinic for annual physical exam .  Works as a  at the [employer] .    Lives with  and 2 children .  Working on loosing weight .  Want to look into medication option .       Patient has been advised of split billing requirements and indicates understanding: Yes  Healthy Habits:     Getting at least 3 servings of Calcium per day:  NO    Bi-annual eye exam:  Yes    Dental care twice a year:  Yes    Sleep apnea or symptoms of sleep apnea:  None    Diet:  Regular (no restrictions)    Frequency of exercise:  None    Taking medications regularly:  Not Applicable    Medication side effects:  Not applicable    PHQ-2 Total Score: 0    Additional concerns today:  Yes          Today's PHQ-2 Score:   PHQ-2 ( 1999 Pfizer) 5/27/2022   Q1: Little interest or pleasure in doing things 0   Q2: Feeling down, depressed or hopeless 0   PHQ-2 Score 0   PHQ-2 Total Score (12-17 Years)- Positive if 3 or more points; Administer PHQ-A if positive -   Q1: Little interest or pleasure in doing things Not at all   Q2: Feeling down, depressed or hopeless Not at all   PHQ-2 Score 0       Abuse: Current or Past (Physical, Sexual or Emotional) - No  Do you feel safe in your environment? Yes        Social History     Tobacco Use     Smoking status: Never Smoker     Smokeless tobacco: Never Used   Substance Use Topics     Alcohol use: Yes     Comment: occ         Alcohol Use 5/27/2022   Prescreen: >3 drinks/day or >7 drinks/week? No   Prescreen: >3 drinks/day or >7 drinks/week? -       Reviewed orders with patient.  Reviewed health maintenance and updated orders accordingly - Yes      Breast Cancer Screening:    Breast CA Risk Assessment (FHS-7) 5/27/2022   Do you have a family history of breast, colon, or ovarian cancer? No / Unknown       click delete button to remove this line now    Pertinent mammograms are  "reviewed under the imaging tab.    History of abnormal Pap smear: NO - age 21-29 PAP every 3 years recommended  PAP / HPV 10/6/2020 12/16/2017   PAP (Historical) NIL NIL     Reviewed and updated as needed this visit by clinical staff   Tobacco  Allergies  Meds   Med Hx  Surg Hx  Fam Hx  Soc Hx          Reviewed and updated as needed this visit by Provider                   Past Medical History:   Diagnosis Date     Anemia      Attention deficit disorder without mention of hyperactivity     not on meds now, tried several     Cellulitis and abscess of trunk 11/10/2017     Hyperlipidemia LDL goal <160 12/31/2011      Past Surgical History:   Procedure Laterality Date     NO HISTORY OF SURGERY         Review of Systems   Constitutional: Negative for chills and fever.   HENT: Negative for congestion, ear pain, hearing loss and sore throat.    Eyes: Negative for pain and visual disturbance.   Respiratory: Negative for cough and shortness of breath.    Cardiovascular: Negative for chest pain, palpitations and peripheral edema.   Gastrointestinal: Negative for abdominal pain, constipation, diarrhea, heartburn, hematochezia and nausea.   Breasts:  Negative for tenderness, breast mass and discharge.   Genitourinary: Negative for dysuria, frequency, genital sores, hematuria, pelvic pain, urgency, vaginal bleeding and vaginal discharge.   Musculoskeletal: Negative for arthralgias, joint swelling and myalgias.   Skin: Negative for rash.   Neurological: Negative for dizziness, weakness, headaches and paresthesias.   Psychiatric/Behavioral: Negative for mood changes. The patient is not nervous/anxious.           OBJECTIVE:   /80   Pulse 72   Temp 98.1  F (36.7  C) (Oral)   Resp 18   Ht 1.702 m (5' 7\")   Wt 112.1 kg (247 lb 3.2 oz)   LMP 04/09/2022 (Approximate)   SpO2 99%   BMI 38.72 kg/m    Physical Exam  GENERAL: healthy, alert and no distress  EYES: Eyes grossly normal to inspection, PERRL and conjunctivae " "and sclerae normal  HENT: ear canals and TM's normal, nose and mouth without ulcers or lesions  NECK: no adenopathy, no asymmetry, masses, or scars and thyroid normal to palpation  RESP: lungs clear to auscultation - no ra  CV: regular rate and rhythm, normal S1 S2, no S3 or S4, no murmur, click or rub, no peripheral edema and peripheral pulses strong  ABDOMEN: soft, nontender, no hepatosplenomegaly, no masses and bowel sounds normal  MS: no gross musculoskeletal defects noted, no edema  SKIN: no suspicious lesions or rashes  NEURO: Normal strength and tone, mentation intact and speech normal  PSYCH: mentation appears normal, affect normal/bright    Pelvic and breast exam deferred by patient .  Diagnostic Test Results:  Labs reviewed in Epic    ASSESSMENT/PLAN:   (Z00.00) Routine general medical examination at a health care facility  (primary encounter diagnosis)  Comment: Discussed healthy eating maintaining ideal weight   Plan:     (E03.8) Subclinical hypothyroidism  Comment:   Plan: TSH with free T4 reflex            (R53.83) Fatigue, unspecified type  Comment:   Plan: CBC with platelets, Basic metabolic panel  (Ca,        Cl, CO2, Creat, Gluc, K, Na, BUN)            (E66.9,  Z68.38) Class 2 obesity without serious comorbidity with body mass index (BMI) of 38.0 to 38.9 in adult, unspecified obesity type  Comment:   Plan: insulin syringe-needle U-100 (31G X 5/16\" 1 ML)        31G X 5/16\" 1 ML miscellaneous, semaglutide         (OZEMPIC) 2 MG/1.5ML SOPN pen, DISCONTINUED:         semaglutide (OZEMPIC) 2 MG/1.5ML SOPN pen        Discussed different ways on loosing weight .   Discussed medication side effect   Recommend to contact with medication side effect .      (Z13.9) Screening for condition  Comment:   Plan: Lipid panel reflex to direct LDL Fasting            Patient has been advised of split billing requirements and indicates understanding: Yes    COUNSELING:  Reviewed preventive health counseling, as " "reflected in patient instructions       Regular exercise       Healthy diet/nutrition    Estimated body mass index is 38.72 kg/m  as calculated from the following:    Height as of this encounter: 1.702 m (5' 7\").    Weight as of this encounter: 112.1 kg (247 lb 3.2 oz).    Weight management plan: Discussed healthy diet and exercise guidelines    She reports that she has never smoked. She has never used smokeless tobacco.      Counseling Resources:  ATP IV Guidelines  Pooled Cohorts Equation Calculator  Breast Cancer Risk Calculator  BRCA-Related Cancer Risk Assessment: FHS-7 Tool  FRAX Risk Assessment  ICSI Preventive Guidelines  Dietary Guidelines for Americans, 2010  USDA's MyPlate  ASA Prophylaxis  Lung CA Screening    Joie Shepherd MD  [facility]  Answers for HPI/ROS submitted by the patient on 5/27/2022  If you checked off any problems, how difficult have these problems made it for you to do your work, take care of things at home, or get along with other people?: Not difficult at all  PHQ9 TOTAL SCORE: 1      "

## 2022-05-29 ENCOUNTER — MYC MEDICAL ADVICE (OUTPATIENT)
Dept: FAMILY MEDICINE | Facility: CLINIC | Age: 27
End: 2022-05-29
Payer: COMMERCIAL

## 2022-05-31 ENCOUNTER — TELEPHONE (OUTPATIENT)
Dept: FAMILY MEDICINE | Facility: CLINIC | Age: 27
End: 2022-05-31
Payer: COMMERCIAL

## 2022-05-31 DIAGNOSIS — E66.9 OBESITY WITHOUT SERIOUS COMORBIDITY, UNSPECIFIED CLASSIFICATION, UNSPECIFIED OBESITY TYPE: Primary | ICD-10-CM

## 2022-05-31 NOTE — TELEPHONE ENCOUNTER
We received a new rx from dr ayala for insulin syringes.  Patient says she is not on insulin - did you intend just pen needles?  If so can you send a new rx for bd pen needles?  Thanks so much    Brenda Pritchett [facility]   on behalf of [facility]

## 2022-06-01 NOTE — TELEPHONE ENCOUNTER
Hi team     Can we please call pharmacy and check .  Syringes were send by mistake .  I did cancel syringes and change it to insulin needles for pen ozempic .  Please confirm with pharmacy to prevent future dispense and dispense needle compatible with ozempic .    Joie

## 2022-06-01 NOTE — TELEPHONE ENCOUNTER
Please see pt MyChart message regarding Ozempic Rx and needles prescribed and advise.     Majo GARCIA RN

## 2022-06-02 ENCOUNTER — TELEPHONE (OUTPATIENT)
Dept: FAMILY MEDICINE | Facility: CLINIC | Age: 27
End: 2022-06-02
Payer: COMMERCIAL

## 2022-06-02 NOTE — TELEPHONE ENCOUNTER
Called and spoke with patient. Patient inquiring why she only receive one month of ozempic. RN advised there are refills on file at pharmacy. No further questions.     Jr MATTHEWS RN

## 2022-06-02 NOTE — TELEPHONE ENCOUNTER
----- Message from Joie Shepherd MD sent at 6/2/2022 12:37 PM CDT -----  Chito Perez     Reviewed your results .   Your TSH mildly elevated , As your FT4 is normal no additional medication required .   Will recommend to have Tsh Check once a year .   We recommend TSH around 2 if you are planning for pregnancy .  Medication will be indicated in that case .    Recommend to continue low cholesterol and low sugar diet .     Hb normal , mildly low MCV recommend to continue iron rich diet .    Thanks   Joie Shepherd MD .

## 2022-06-02 NOTE — TELEPHONE ENCOUNTER
Patient notified of message below.   She has further questions about the ozempic prescription that was sent in. Triage not available at time of call.    Grace Dean,

## 2022-06-02 NOTE — TELEPHONE ENCOUNTER
Attempted to reach patient, LVMTCB. Need to relay provider result note below.     Jr MATTHEWS RN

## 2022-08-10 ENCOUNTER — OFFICE VISIT (OUTPATIENT)
Dept: FAMILY MEDICINE | Facility: CLINIC | Age: 27
End: 2022-08-10
Payer: COMMERCIAL

## 2022-08-10 VITALS
HEART RATE: 72 BPM | DIASTOLIC BLOOD PRESSURE: 76 MMHG | SYSTOLIC BLOOD PRESSURE: 126 MMHG | RESPIRATION RATE: 16 BRPM | TEMPERATURE: 98 F | OXYGEN SATURATION: 100 %

## 2022-08-10 DIAGNOSIS — E03.8 SUBCLINICAL HYPOTHYROIDISM: ICD-10-CM

## 2022-08-10 DIAGNOSIS — R42 DIZZINESS: Primary | ICD-10-CM

## 2022-08-10 DIAGNOSIS — E55.9 VITAMIN D DEFICIENCY: ICD-10-CM

## 2022-08-10 DIAGNOSIS — R73.03 PREDIABETES: ICD-10-CM

## 2022-08-10 DIAGNOSIS — D50.9 IRON DEFICIENCY ANEMIA, UNSPECIFIED IRON DEFICIENCY ANEMIA TYPE: ICD-10-CM

## 2022-08-10 LAB
ERYTHROCYTE [DISTWIDTH] IN BLOOD BY AUTOMATED COUNT: 15.1 % (ref 10–15)
HBA1C MFR BLD: 5.8 % (ref 0–5.6)
HCT VFR BLD AUTO: 39 % (ref 35–47)
HGB BLD-MCNC: 11.7 G/DL (ref 11.7–15.7)
MCH RBC QN AUTO: 23.1 PG (ref 26.5–33)
MCHC RBC AUTO-ENTMCNC: 30 G/DL (ref 31.5–36.5)
MCV RBC AUTO: 77 FL (ref 78–100)
PLATELET # BLD AUTO: 344 10E3/UL (ref 150–450)
RBC # BLD AUTO: 5.06 10E6/UL (ref 3.8–5.2)
T3 SERPL-MCNC: 92 NG/DL (ref 85–202)
T3FREE SERPL-MCNC: 2.7 PG/ML (ref 2–4.4)
WBC # BLD AUTO: 8.5 10E3/UL (ref 4–11)

## 2022-08-10 PROCEDURE — 84439 ASSAY OF FREE THYROXINE: CPT | Performed by: NURSE PRACTITIONER

## 2022-08-10 PROCEDURE — U0003 INFECTIOUS AGENT DETECTION BY NUCLEIC ACID (DNA OR RNA); SEVERE ACUTE RESPIRATORY SYNDROME CORONAVIRUS 2 (SARS-COV-2) (CORONAVIRUS DISEASE [COVID-19]), AMPLIFIED PROBE TECHNIQUE, MAKING USE OF HIGH THROUGHPUT TECHNOLOGIES AS DESCRIBED BY CMS-2020-01-R: HCPCS | Performed by: NURSE PRACTITIONER

## 2022-08-10 PROCEDURE — 84443 ASSAY THYROID STIM HORMONE: CPT | Performed by: NURSE PRACTITIONER

## 2022-08-10 PROCEDURE — 99214 OFFICE O/P EST MOD 30 MIN: CPT | Performed by: NURSE PRACTITIONER

## 2022-08-10 PROCEDURE — 82728 ASSAY OF FERRITIN: CPT | Performed by: NURSE PRACTITIONER

## 2022-08-10 PROCEDURE — 86800 THYROGLOBULIN ANTIBODY: CPT | Performed by: NURSE PRACTITIONER

## 2022-08-10 PROCEDURE — 82947 ASSAY GLUCOSE BLOOD QUANT: CPT | Performed by: NURSE PRACTITIONER

## 2022-08-10 PROCEDURE — 85027 COMPLETE CBC AUTOMATED: CPT | Performed by: NURSE PRACTITIONER

## 2022-08-10 PROCEDURE — 82306 VITAMIN D 25 HYDROXY: CPT | Performed by: NURSE PRACTITIONER

## 2022-08-10 PROCEDURE — 36415 COLL VENOUS BLD VENIPUNCTURE: CPT | Performed by: NURSE PRACTITIONER

## 2022-08-10 PROCEDURE — U0005 INFEC AGEN DETEC AMPLI PROBE: HCPCS | Performed by: NURSE PRACTITIONER

## 2022-08-10 PROCEDURE — 83036 HEMOGLOBIN GLYCOSYLATED A1C: CPT | Performed by: NURSE PRACTITIONER

## 2022-08-10 PROCEDURE — 86376 MICROSOMAL ANTIBODY EACH: CPT | Performed by: NURSE PRACTITIONER

## 2022-08-10 PROCEDURE — 83550 IRON BINDING TEST: CPT | Performed by: NURSE PRACTITIONER

## 2022-08-10 PROCEDURE — 84481 FREE ASSAY (FT-3): CPT | Performed by: NURSE PRACTITIONER

## 2022-08-10 NOTE — PROGRESS NOTES
Assessment & Plan     (R42) Dizziness  (primary encounter diagnosis)  Comment: Unknown etiology of symptoms today.  This could be a viral syndrome.  Will screen for COVID.  Differentials include hypoglycemia, anemia, hypotension, stress, among others.  Do not suspect emergent etiology today.  We will screen with ordered labs and follow-up as needed  Plan: Symptomatic; Yes; 8/8/2022 COVID-19 Virus         (Coronavirus) by PCR Nose            (D50.9) Iron deficiency anemia, unspecified iron deficiency anemia type  Comment: History of iron deficiency.  Last MCV was low so we will recheck again today  Plan: CBC with platelets, Ferritin, Iron and iron         binding capacity, Anti thyroglobulin antibody,         T3, Free, T3, total            (E03.8) Subclinical hypothyroidism  Comment: History of abnormal TSH.  This all started right after her last pregnancy so suspect this is Hashimoto's.  We will also screen antibodies today  Plan: TSH, T4, free, Thyroid peroxidase antibody            (R73.03) Prediabetes  Comment: History of prediabetes.  We will screen A1c  Plan: Hemoglobin A1c, Glucose            (E55.9) Vitamin D deficiency  Comment: History of vitamin D deficiency.  We will screen again today  Plan: Vitamin D Deficiency                KELLIE Hernandez CNP  [facility] SOFIYA Perez is a 26 year old, presenting for the following health issues:  No chief complaint on file.      HPI     Dizziness, lightheaded, diaphoretic on and off, started yesterday with sinus-like symptoms/facial pain with headaches, negative home covid test   Upper jaw is sore  Feeling nauseous   Neg pregnancy and COVID test at home   No known sick contacts  History of anemia but not currently on supplements    Was on synthroid but stopped at the direction of her PCP.  Thyroid issues started after her last pregnancy    Has really have periods   Initially had OCPs. Caused breakouts.       Review of Systems    Detailed as above       Objective    /76 (BP Location: Left arm, Patient Position: Sitting, Cuff Size: Adult Large)   Pulse 72   Temp 98  F (36.7  C) (Oral)   Resp 16   SpO2 100%   There is no height or weight on file to calculate BMI.  Physical Exam  Vitals reviewed.   HENT:      Head: Normocephalic.      Right Ear: Tympanic membrane, ear canal and external ear normal.      Left Ear: Tympanic membrane and ear canal normal.      Nose:      Comments: Significant nares edema     Mouth/Throat:      Mouth: Mucous membranes are moist.      Pharynx: Oropharynx is clear.   Eyes:      Conjunctiva/sclera: Conjunctivae normal.   Cardiovascular:      Rate and Rhythm: Normal rate and regular rhythm.      Heart sounds: Normal heart sounds. No murmur heard.  Pulmonary:      Effort: Pulmonary effort is normal. No respiratory distress.      Breath sounds: Normal breath sounds.   Lymphadenopathy:      Cervical: No cervical adenopathy.   Skin:     General: Skin is warm and dry.   Neurological:      Mental Status: She is alert.   Psychiatric:         Mood and Affect: Mood normal.         Behavior: Behavior normal.            Results for orders placed or performed in visit on 08/10/22   Symptomatic; Yes; 8/8/2022 COVID-19 Virus (Coronavirus) by PCR Nose     Status: Normal    Specimen: Nose; Swab   Result Value Ref Range    SARS CoV2 PCR Negative Negative    Narrative    Testing was performed using the dinorah SARS-CoV-2 assay on the dinorah  LoveIt0 System. This test should be ordered for the detection of  SARS-CoV-2 in individuals who meet SARS-CoV-2 clinical and/or  epidemiological criteria. Test performance is unknown in asymptomatic  patients. This test is for in vitro diagnostic use under the FDA EUA  for laboratories certified under CLIA to perform high and/or moderate  complexity testing. This test has not been FDA cleared or approved. A  negative result does not rule out the presence of PCR inhibitors in  the specimen or  target RNA in concentration below the limit of  detection for the assay. The possibility of a false negative should  be considered if the patient's recent exposure or clinical  presentation suggests COVID-19. This test was validated by the [facility]. This  laboratory is certified under the Clinical Laboratory Improvement  Amendments of 1988 (CLIA-88) as qualified to perform high and/or  moderate complexity laboratory testing.   TSH     Status: Normal   Result Value Ref Range    TSH 2.40 0.40 - 4.00 mU/L   T4, free     Status: Normal   Result Value Ref Range    Free T4 0.87 0.76 - 1.46 ng/dL   Thyroid peroxidase antibody     Status: Normal   Result Value Ref Range    Thyroid Peroxidase Antibody <10 <35 IU/mL   CBC with platelets     Status: Abnormal   Result Value Ref Range    WBC Count 8.5 4.0 - 11.0 10e3/uL    RBC Count 5.06 3.80 - 5.20 10e6/uL    Hemoglobin 11.7 11.7 - 15.7 g/dL    Hematocrit 39.0 35.0 - 47.0 %    MCV 77 (L) 78 - 100 fL    MCH 23.1 (L) 26.5 - 33.0 pg    MCHC 30.0 (L) 31.5 - 36.5 g/dL    RDW 15.1 (H) 10.0 - 15.0 %    Platelet Count 344 150 - 450 10e3/uL   Ferritin     Status: Normal   Result Value Ref Range    Ferritin 18 12 - 150 ng/mL   Iron and iron binding capacity     Status: Abnormal   Result Value Ref Range    Iron 34 (L) 35 - 180 ug/dL    Iron Binding Capacity 382 240 - 430 ug/dL    Iron Sat Index 9 (L) 15 - 46 %   Anti thyroglobulin antibody     Status: Abnormal   Result Value Ref Range    Thyroglobulin Antibody 46 (H) <40 IU/mL   T3, Free     Status: Normal   Result Value Ref Range    T3 Free 2.7 2.0 - 4.4 pg/mL   T3, total     Status: Normal   Result Value Ref Range    T3 Total 92 85 - 202 ng/dL   Hemoglobin A1c     Status: Abnormal   Result Value Ref Range    Hemoglobin A1C 5.8 (H) 0.0 - 5.6 %   Glucose     Status: Abnormal   Result Value Ref Range    Glucose 128 (H) 70 - 99 mg/dL    Patient Fasting > 8hrs? No    Vitamin D Deficiency      Status: Abnormal   Result Value Ref Range    Vitamin D, Total (25-Hydroxy) 14 (L) 20 - 75 ug/L    Narrative    Season, race, dietary intake, and treatment affect the concentration of 25-hydroxy-Vitamin D. Values may decrease during winter months and increase during summer months. Values 20-29 ug/L may indicate Vitamin D insufficiency and values <20 ug/L may indicate Vitamin D deficiency.    Vitamin D determination is routinely performed by an immunoassay specific for 25 hydroxyvitamin D3.  If an individual is on vitamin D2(ergocalciferol) supplementation, please specify 25 OH vitamin D2 and D3 level determination by LCMSMS test [id number].                 .  ..

## 2022-08-11 LAB
DEPRECATED CALCIDIOL+CALCIFEROL SERPL-MC: 14 UG/L (ref 20–75)
FASTING STATUS PATIENT QL REPORTED: NO
FERRITIN SERPL-MCNC: 18 NG/ML (ref 12–150)
GLUCOSE BLD-MCNC: 128 MG/DL (ref 70–99)
IRON SATN MFR SERPL: 9 % (ref 15–46)
IRON SERPL-MCNC: 34 UG/DL (ref 35–180)
SARS-COV-2 RNA RESP QL NAA+PROBE: NEGATIVE
T4 FREE SERPL-MCNC: 0.87 NG/DL (ref 0.76–1.46)
THYROGLOB AB SERPL IA-ACNC: 46 IU/ML
THYROPEROXIDASE AB SERPL-ACNC: <10 IU/ML
TIBC SERPL-MCNC: 382 UG/DL (ref 240–430)
TSH SERPL DL<=0.005 MIU/L-ACNC: 2.4 MU/L (ref 0.4–4)

## 2022-08-12 NOTE — RESULT ENCOUNTER NOTE
Your vitamin D is really low. I would recommend 10,000iu daily for 1 month then go to 4,000-5,000 daily.   Your iron level is low. Please take ferrous sulfate 325mg (or any other iron supplement) 3 days a week on MWF.   Your thyroid antibodies are positive. This is indicative of Hashimoto's. Your level is quite low and you have a normal TSH so we do not have to do anything about it at this time.   Your A1C went up to the prediabetes range. Please continue to work on healthy diet choices to avoid getting into diabetes range.  Let me know if you have any questions  Ben

## 2022-09-06 ENCOUNTER — OFFICE VISIT (OUTPATIENT)
Dept: FAMILY MEDICINE | Facility: CLINIC | Age: 27
End: 2022-09-06
Payer: COMMERCIAL

## 2022-09-06 VITALS
RESPIRATION RATE: 18 BRPM | TEMPERATURE: 98.7 F | DIASTOLIC BLOOD PRESSURE: 78 MMHG | OXYGEN SATURATION: 100 % | HEART RATE: 81 BPM | WEIGHT: 241 LBS | SYSTOLIC BLOOD PRESSURE: 122 MMHG | BODY MASS INDEX: 37.83 KG/M2 | HEIGHT: 67 IN

## 2022-09-06 DIAGNOSIS — E66.812 CLASS 2 OBESITY WITHOUT SERIOUS COMORBIDITY WITH BODY MASS INDEX (BMI) OF 38.0 TO 38.9 IN ADULT, UNSPECIFIED OBESITY TYPE: ICD-10-CM

## 2022-09-06 DIAGNOSIS — E55.9 VITAMIN D DEFICIENCY: Primary | ICD-10-CM

## 2022-09-06 DIAGNOSIS — K21.00 GASTROESOPHAGEAL REFLUX DISEASE WITH ESOPHAGITIS WITHOUT HEMORRHAGE: ICD-10-CM

## 2022-09-06 PROCEDURE — 99214 OFFICE O/P EST MOD 30 MIN: CPT | Performed by: FAMILY MEDICINE

## 2022-09-06 RX ORDER — ERGOCALCIFEROL 1.25 MG/1
50000 CAPSULE, LIQUID FILLED ORAL WEEKLY
Qty: 12 CAPSULE | Refills: 0 | Status: SHIPPED | OUTPATIENT
Start: 2022-09-06 | End: 2023-06-05

## 2022-09-06 ASSESSMENT — ANXIETY QUESTIONNAIRES
7. FEELING AFRAID AS IF SOMETHING AWFUL MIGHT HAPPEN: NOT AT ALL
1. FEELING NERVOUS, ANXIOUS, OR ON EDGE: NOT AT ALL
5. BEING SO RESTLESS THAT IT IS HARD TO SIT STILL: NOT AT ALL
2. NOT BEING ABLE TO STOP OR CONTROL WORRYING: NOT AT ALL
GAD7 TOTAL SCORE: 1
3. WORRYING TOO MUCH ABOUT DIFFERENT THINGS: NOT AT ALL
GAD7 TOTAL SCORE: 1
8. IF YOU CHECKED OFF ANY PROBLEMS, HOW DIFFICULT HAVE THESE MADE IT FOR YOU TO DO YOUR WORK, TAKE CARE OF THINGS AT HOME, OR GET ALONG WITH OTHER PEOPLE?: NOT DIFFICULT AT ALL
4. TROUBLE RELAXING: SEVERAL DAYS
GAD7 TOTAL SCORE: 1
6. BECOMING EASILY ANNOYED OR IRRITABLE: NOT AT ALL
7. FEELING AFRAID AS IF SOMETHING AWFUL MIGHT HAPPEN: NOT AT ALL
IF YOU CHECKED OFF ANY PROBLEMS ON THIS QUESTIONNAIRE, HOW DIFFICULT HAVE THESE PROBLEMS MADE IT FOR YOU TO DO YOUR WORK, TAKE CARE OF THINGS AT HOME, OR GET ALONG WITH OTHER PEOPLE: NOT DIFFICULT AT ALL

## 2022-09-06 ASSESSMENT — PATIENT HEALTH QUESTIONNAIRE - PHQ9
SUM OF ALL RESPONSES TO PHQ QUESTIONS 1-9: 1
10. IF YOU CHECKED OFF ANY PROBLEMS, HOW DIFFICULT HAVE THESE PROBLEMS MADE IT FOR YOU TO DO YOUR WORK, TAKE CARE OF THINGS AT HOME, OR GET ALONG WITH OTHER PEOPLE: NOT DIFFICULT AT ALL
SUM OF ALL RESPONSES TO PHQ QUESTIONS 1-9: 1

## 2022-09-06 ASSESSMENT — ENCOUNTER SYMPTOMS
ARTHRALGIAS: 0
AGITATION: 0
HEADACHES: 0
BACK PAIN: 0

## 2022-09-06 NOTE — PROGRESS NOTES
Assessment & Plan     Class 2 obesity without serious comorbidity with body mass index (BMI) of 38.0 to 38.9 in adult, unspecified obesity type  Patient has noticed some dizziness   Discussed increased hydration   Recommend to continue with hydration .  - semaglutide (OZEMPIC) 2 MG/1.5ML SOPN pen; Inject 0.5 mg Subcutaneous every 7 days    Vitamin D deficiency  - vitamin D2 (ERGOCALCIFEROL) 11216 units (1250 mcg) capsule; Take 1 capsule (50,000 Units) by mouth once a week    Gastroesophageal reflux disease with esophagitis without hemorrhage  - omeprazole (PRILOSEC) 20 MG DR capsule; Take 1 capsule (20 mg) by mouth daily    Patient not taking Levothyroxine.  Patient tsh at goal .  Will monitor off medication .      Return in about 3 months (around 12/6/2022) for Follow up.    Joie Shepherd MD  [facility] PAIGE Perez is a 27 year old, presenting for the following health issues:  Recheck Medication      History of Present Illness       Hypothyroidism:     Since last visit, patient describes the following symptoms::  None    Reason for visit:  Medication F/U and questions on Thyroid    She eats 2-3 servings of fruits and vegetables daily.She consumes 0 sweetened beverage(s) daily.She exercises with enough effort to increase her heart rate 10 to 19 minutes per day.  She exercises with enough effort to increase her heart rate 3 or less days per week.   She is taking medications regularly.    Today's PHQ-9         PHQ-9 Total Score: 1    PHQ-9 Q9 Thoughts of better off dead/self-harm past 2 weeks :   Not at all    How difficult have these problems made it for you to do your work, take care of things at home, or get along with other people: Not difficult at all  Today's PAOLO-7 Score: 1         Review of Systems   Musculoskeletal: Negative for arthralgias and back pain.   Neurological: Negative for headaches.   Psychiatric/Behavioral: Negative for agitation and behavioral problems.           "  Objective    /78 (BP Location: Right arm, Patient Position: Sitting, Cuff Size: Adult Large)   Pulse 81   Temp 98.7  F (37.1  C) (Oral)   Resp 18   Ht 1.702 m (5' 7\")   Wt 109.3 kg (241 lb)   LMP 08/31/2022 (Approximate)   SpO2 100%   BMI 37.75 kg/m    Body mass index is 37.75 kg/m .  Physical Exam  HENT:      Mouth/Throat:      Mouth: Mucous membranes are moist.   Eyes:      Pupils: Pupils are equal, round, and reactive to light.   Cardiovascular:      Rate and Rhythm: Normal rate.   Pulmonary:      Effort: Pulmonary effort is normal.   Abdominal:      General: Abdomen is flat.   Skin:     General: Skin is warm.   Neurological:      General: No focal deficit present.      Mental Status: She is alert.   Psychiatric:         Mood and Affect: Mood normal.            "

## 2022-09-19 NOTE — PROGRESS NOTES
"Ana Mauricio is a 24 year old female who is being evaluated via a billable telephone visit.      The patient has been notified of following:     \"This telephone visit will be conducted via a call between you and your physician/provider. We have found that certain health care needs can be provided without the need for a physical exam.  This service lets us provide the care you need with a short phone conversation.  If a prescription is necessary we can send it directly to your pharmacy.  If lab work is needed we can place an order for that and you can then stop by our lab to have the test done at a later time.    Telephone visits are billed at different rates depending on your insurance coverage. During this emergency period, for some insurers they may be billed the same as an in-person visit.  Please reach out to your insurance provider with any questions.    If during the course of the call the physician/provider feels a telephone visit is not appropriate, you will not be charged for this service.\"    Patient has given verbal consent for Telephone visit?  Yes    What phone number would you like to be contacted at? [phone]    How would you like to obtain your AVS? MyChart    " [phone]

## 2022-11-21 ENCOUNTER — HEALTH MAINTENANCE LETTER (OUTPATIENT)
Age: 27
End: 2022-11-21

## 2023-01-23 ENCOUNTER — OFFICE VISIT (OUTPATIENT)
Dept: FAMILY MEDICINE | Facility: CLINIC | Age: 28
End: 2023-01-23
Payer: COMMERCIAL

## 2023-01-23 VITALS
RESPIRATION RATE: 16 BRPM | TEMPERATURE: 98.4 F | BODY MASS INDEX: 38.09 KG/M2 | OXYGEN SATURATION: 100 % | HEART RATE: 84 BPM | WEIGHT: 251.3 LBS | HEIGHT: 68 IN | DIASTOLIC BLOOD PRESSURE: 88 MMHG | SYSTOLIC BLOOD PRESSURE: 131 MMHG

## 2023-01-23 DIAGNOSIS — E66.812 CLASS 2 OBESITY WITHOUT SERIOUS COMORBIDITY WITH BODY MASS INDEX (BMI) OF 38.0 TO 38.9 IN ADULT, UNSPECIFIED OBESITY TYPE: Primary | ICD-10-CM

## 2023-01-23 PROCEDURE — 80048 BASIC METABOLIC PNL TOTAL CA: CPT | Performed by: FAMILY MEDICINE

## 2023-01-23 PROCEDURE — 99213 OFFICE O/P EST LOW 20 MIN: CPT | Performed by: FAMILY MEDICINE

## 2023-01-23 PROCEDURE — 36415 COLL VENOUS BLD VENIPUNCTURE: CPT | Performed by: FAMILY MEDICINE

## 2023-01-23 RX ORDER — PEN NEEDLE, DIABETIC 29 G X1/2"
NEEDLE, DISPOSABLE MISCELLANEOUS
COMMUNITY
Start: 2022-05-27 | End: 2023-10-27

## 2023-01-23 RX ORDER — SEMAGLUTIDE 1 MG/.5ML
1 INJECTION, SOLUTION SUBCUTANEOUS WEEKLY
Qty: 3 ML | Refills: 1 | Status: SHIPPED | OUTPATIENT
Start: 2023-01-23 | End: 2023-06-05

## 2023-01-23 ASSESSMENT — PAIN SCALES - GENERAL: PAINLEVEL: NO PAIN (0)

## 2023-01-23 NOTE — PROGRESS NOTES
"  Assessment & Plan     Class 2 obesity without serious comorbidity with body mass index (BMI) of 38.0 to 38.9 in adult, unspecified obesity type  - Semaglutide-Weight Management (WEGOVY) 1 MG/0.5ML SOAJ; Inject 1 mg Subcutaneous once a week  - Basic metabolic panel  (Ca, Cl, CO2, Creat, Gluc, K, Na, BUN)    Discussed diet modification      BMI:   Estimated body mass index is 38.49 kg/m  as calculated from the following:    Height as of this encounter: 1.721 m (5' 7.75\").    Weight as of this encounter: 114 kg (251 lb 4.8 oz).   Weight management plan: Discussed healthy diet and exercise guidelines    Return in about 5 months (around 6/23/2023) for Routine preventive.    Joie Shepherd MD  [facility] PAIGE Perez is a 27 year old, presenting for the following health issues:  Weight Loss (Medication check)      History of Present Illness       Reason for visit:  Medication Follow up    She eats 0-1 servings of fruits and vegetables daily.She consumes 1 sweetened beverage(s) daily.She exercises with enough effort to increase her heart rate 10 to 19 minutes per day.  She exercises with enough effort to increase her heart rate 3 or less days per week. She is missing 1 dose(s) of medications per week.  She is not taking prescribed medications regularly due to remembering to take.           Review of Systems   Constitutional: Negative for activity change and appetite change.   Respiratory: Negative for cough.    Neurological: Negative for headaches.   Psychiatric/Behavioral: Negative for agitation and behavioral problems.          Objective    /88 (BP Location: Right arm, Patient Position: Sitting, Cuff Size: Adult Regular)   Pulse 84   Temp 98.4  F (36.9  C) (Oral)   Resp 16   Ht 1.721 m (5' 7.75\")   Wt 114 kg (251 lb 4.8 oz)   LMP 01/22/2023 (Exact Date)   SpO2 100%   BMI 38.49 kg/m    Body mass index is 38.49 kg/m .  Physical Exam  Pulmonary:      Effort: Pulmonary effort is " normal.   Abdominal:      General: Abdomen is flat.   Skin:     General: Skin is warm.   Neurological:      General: No focal deficit present.   Psychiatric:         Mood and Affect: Mood normal.         Behavior: Behavior normal.         Thought Content: Thought content normal.

## 2023-01-24 LAB
ANION GAP SERPL CALCULATED.3IONS-SCNC: 14 MMOL/L (ref 7–15)
BUN SERPL-MCNC: 11.3 MG/DL (ref 6–20)
CALCIUM SERPL-MCNC: 9.8 MG/DL (ref 8.6–10)
CHLORIDE SERPL-SCNC: 103 MMOL/L (ref 98–107)
CREAT SERPL-MCNC: 0.72 MG/DL (ref 0.51–0.95)
DEPRECATED HCO3 PLAS-SCNC: 23 MMOL/L (ref 22–29)
GFR SERPL CREATININE-BSD FRML MDRD: >90 ML/MIN/1.73M2
GLUCOSE SERPL-MCNC: 96 MG/DL (ref 70–99)
POTASSIUM SERPL-SCNC: 4.2 MMOL/L (ref 3.4–5.3)
SODIUM SERPL-SCNC: 140 MMOL/L (ref 136–145)

## 2023-01-24 ASSESSMENT — ENCOUNTER SYMPTOMS
HEADACHES: 0
AGITATION: 0
COUGH: 0
ACTIVITY CHANGE: 0
APPETITE CHANGE: 0

## 2023-02-03 ENCOUNTER — TELEPHONE (OUTPATIENT)
Dept: FAMILY MEDICINE | Facility: CLINIC | Age: 28
End: 2023-02-03
Payer: COMMERCIAL

## 2023-02-03 DIAGNOSIS — E66.813 CLASS 3 SEVERE OBESITY IN ADULT, UNSPECIFIED BMI, UNSPECIFIED OBESITY TYPE, UNSPECIFIED WHETHER SERIOUS COMORBIDITY PRESENT (H): Primary | ICD-10-CM

## 2023-02-03 DIAGNOSIS — E66.01 CLASS 3 SEVERE OBESITY IN ADULT, UNSPECIFIED BMI, UNSPECIFIED OBESITY TYPE, UNSPECIFIED WHETHER SERIOUS COMORBIDITY PRESENT (H): Primary | ICD-10-CM

## 2023-02-03 NOTE — TELEPHONE ENCOUNTER
Call received from patient stating at last office visit with primary care provider she was prescribed Wegovy but it was very expensive even with insurance. States pharmacy told patient they have Ozempic available in the 1 mg dose if MD would consider prescribing this as it would be less expensive. They have 2 boxes available at this time.

## 2023-06-05 ENCOUNTER — OFFICE VISIT (OUTPATIENT)
Dept: FAMILY MEDICINE | Facility: CLINIC | Age: 28
End: 2023-06-05
Payer: COMMERCIAL

## 2023-06-05 VITALS
HEIGHT: 67 IN | RESPIRATION RATE: 14 BRPM | OXYGEN SATURATION: 100 % | WEIGHT: 256 LBS | HEART RATE: 81 BPM | BODY MASS INDEX: 40.18 KG/M2 | TEMPERATURE: 98 F | DIASTOLIC BLOOD PRESSURE: 88 MMHG | SYSTOLIC BLOOD PRESSURE: 132 MMHG

## 2023-06-05 DIAGNOSIS — Z13.9 SCREENING FOR CONDITION: ICD-10-CM

## 2023-06-05 DIAGNOSIS — Z12.4 CERVICAL CANCER SCREENING: ICD-10-CM

## 2023-06-05 DIAGNOSIS — Z00.00 ROUTINE GENERAL MEDICAL EXAMINATION AT A HEALTH CARE FACILITY: ICD-10-CM

## 2023-06-05 DIAGNOSIS — E66.813 CLASS 3 SEVERE OBESITY WITHOUT SERIOUS COMORBIDITY WITH BODY MASS INDEX (BMI) OF 40.0 TO 44.9 IN ADULT, UNSPECIFIED OBESITY TYPE (H): ICD-10-CM

## 2023-06-05 DIAGNOSIS — E66.01 CLASS 3 SEVERE OBESITY WITHOUT SERIOUS COMORBIDITY WITH BODY MASS INDEX (BMI) OF 40.0 TO 44.9 IN ADULT, UNSPECIFIED OBESITY TYPE (H): ICD-10-CM

## 2023-06-05 PROCEDURE — 99213 OFFICE O/P EST LOW 20 MIN: CPT | Mod: 25 | Performed by: FAMILY MEDICINE

## 2023-06-05 PROCEDURE — 99395 PREV VISIT EST AGE 18-39: CPT | Performed by: FAMILY MEDICINE

## 2023-06-05 PROCEDURE — G0145 SCR C/V CYTO,THINLAYER,RESCR: HCPCS | Performed by: FAMILY MEDICINE

## 2023-06-05 RX ORDER — PHENTERMINE HYDROCHLORIDE 37.5 MG/1
37.5 TABLET ORAL
Qty: 90 TABLET | Refills: 0 | Status: SHIPPED | OUTPATIENT
Start: 2023-06-05

## 2023-06-05 SDOH — ECONOMIC STABILITY: INCOME INSECURITY: HOW HARD IS IT FOR YOU TO PAY FOR THE VERY BASICS LIKE FOOD, HOUSING, MEDICAL CARE, AND HEATING?: PATIENT DECLINED

## 2023-06-05 SDOH — ECONOMIC STABILITY: FOOD INSECURITY: WITHIN THE PAST 12 MONTHS, YOU WORRIED THAT YOUR FOOD WOULD RUN OUT BEFORE YOU GOT MONEY TO BUY MORE.: PATIENT DECLINED

## 2023-06-05 SDOH — ECONOMIC STABILITY: INCOME INSECURITY: IN THE LAST 12 MONTHS, WAS THERE A TIME WHEN YOU WERE NOT ABLE TO PAY THE MORTGAGE OR RENT ON TIME?: PATIENT REFUSED

## 2023-06-05 SDOH — HEALTH STABILITY: PHYSICAL HEALTH: ON AVERAGE, HOW MANY DAYS PER WEEK DO YOU ENGAGE IN MODERATE TO STRENUOUS EXERCISE (LIKE A BRISK WALK)?: 3 DAYS

## 2023-06-05 SDOH — ECONOMIC STABILITY: FOOD INSECURITY: WITHIN THE PAST 12 MONTHS, THE FOOD YOU BOUGHT JUST DIDN'T LAST AND YOU DIDN'T HAVE MONEY TO GET MORE.: PATIENT DECLINED

## 2023-06-05 SDOH — HEALTH STABILITY: PHYSICAL HEALTH: ON AVERAGE, HOW MANY MINUTES DO YOU ENGAGE IN EXERCISE AT THIS LEVEL?: 30 MIN

## 2023-06-05 SDOH — ECONOMIC STABILITY: TRANSPORTATION INSECURITY
IN THE PAST 12 MONTHS, HAS LACK OF TRANSPORTATION KEPT YOU FROM MEETINGS, WORK, OR FROM GETTING THINGS NEEDED FOR DAILY LIVING?: PATIENT DECLINED

## 2023-06-05 ASSESSMENT — ENCOUNTER SYMPTOMS
SORE THROAT: 0
DIZZINESS: 0
EYE PAIN: 0
ABDOMINAL PAIN: 0
PARESTHESIAS: 0
DYSURIA: 0
JOINT SWELLING: 1
HEADACHES: 1
NAUSEA: 0
MYALGIAS: 0
COUGH: 0
HEARTBURN: 1
DIARRHEA: 0
HEMATURIA: 0
FEVER: 0
SHORTNESS OF BREATH: 0
HEMATOCHEZIA: 0
ARTHRALGIAS: 0
NERVOUS/ANXIOUS: 0
PALPITATIONS: 0
WEAKNESS: 0
CONSTIPATION: 0
CHILLS: 0
FREQUENCY: 1
BREAST MASS: 0

## 2023-06-05 ASSESSMENT — SOCIAL DETERMINANTS OF HEALTH (SDOH)
HOW OFTEN DO YOU GET TOGETHER WITH FRIENDS OR RELATIVES?: MORE THAN THREE TIMES A WEEK
IN A TYPICAL WEEK, HOW MANY TIMES DO YOU TALK ON THE PHONE WITH FAMILY, FRIENDS, OR NEIGHBORS?: ONCE A WEEK
DO YOU BELONG TO ANY CLUBS OR ORGANIZATIONS SUCH AS CHURCH GROUPS UNIONS, FRATERNAL OR ATHLETIC GROUPS, OR SCHOOL GROUPS?: NO
HOW OFTEN DO YOU ATTEND CHURCH OR RELIGIOUS SERVICES?: 1 TO 4 TIMES PER YEAR

## 2023-06-05 ASSESSMENT — PATIENT HEALTH QUESTIONNAIRE - PHQ9
SUM OF ALL RESPONSES TO PHQ QUESTIONS 1-9: 3
SUM OF ALL RESPONSES TO PHQ QUESTIONS 1-9: 3
10. IF YOU CHECKED OFF ANY PROBLEMS, HOW DIFFICULT HAVE THESE PROBLEMS MADE IT FOR YOU TO DO YOUR WORK, TAKE CARE OF THINGS AT HOME, OR GET ALONG WITH OTHER PEOPLE: NOT DIFFICULT AT ALL

## 2023-06-05 ASSESSMENT — LIFESTYLE VARIABLES
HOW OFTEN DO YOU HAVE SIX OR MORE DRINKS ON ONE OCCASION: NEVER
SKIP TO QUESTIONS 9-10: 1
HOW MANY STANDARD DRINKS CONTAINING ALCOHOL DO YOU HAVE ON A TYPICAL DAY: 1 OR 2
HOW OFTEN DO YOU HAVE A DRINK CONTAINING ALCOHOL: 2-4 TIMES A MONTH
AUDIT-C TOTAL SCORE: 2

## 2023-06-05 NOTE — PROGRESS NOTES
SUBJECTIVE:   CC: Ana is an 27 year old who presents for preventive health visit.        View : No data to display.              Healthy Habits:     Getting at least 3 servings of Calcium per day:  Yes    Bi-annual eye exam:  Yes    Dental care twice a year:  NO    Sleep apnea or symptoms of sleep apnea:  Excessive snoring    Diet:  Regular (no restrictions) and Carbohydrate counting    Frequency of exercise:  2-3 days/week    Duration of exercise:  15-30 minutes    Taking medications regularly:  Yes    Medication side effects:  Muscle aches, Significant flushing and Lightheadedness    PHQ-2 Total Score: 0    Additional concerns today:  No      Ana describe she stopped her ozempic due to side effect .  Has not lost much wait .  Working on diet modification     Social History     Tobacco Use     Smoking status: Never     Smokeless tobacco: Never   Vaping Use     Vaping status: Never Used   Substance Use Topics     Alcohol use: Yes     Comment: occ             2023     1:20 PM   Alcohol Use   Prescreen: >3 drinks/day or >7 drinks/week? No     Reviewed orders with patient.  Reviewed health maintenance and updated orders accordingly - Yes      Breast Cancer Screenin/27/2022     7:00 AM   Breast CA Risk Assessment (FHS-7)   Do you have a family history of breast, colon, or ovarian cancer? No / Unknown       click delete button to remove this line now    Pertinent mammograms are reviewed under the imaging tab.    History of abnormal Pap smear:       10/6/2020    11:44 AM 2017     8:47 AM   PAP / HPV   PAP (Historical) NIL   NIL       Reviewed and updated as needed this visit by clinical staff    Allergies               Reviewed and updated as needed this visit by Provider                 Past Medical History:   Diagnosis Date     Anemia      Attention deficit disorder without mention of hyperactivity     not on meds now, tried several     Cellulitis and abscess of trunk 11/10/2017      "Hyperlipidemia LDL goal <160 12/31/2011      Past Surgical History:   Procedure Laterality Date     NO HISTORY OF SURGERY         Review of Systems   Constitutional: Negative for chills and fever.   HENT: Negative for congestion, ear pain, hearing loss and sore throat.    Eyes: Negative for pain and visual disturbance.   Respiratory: Negative for cough and shortness of breath.    Cardiovascular: Negative for chest pain, palpitations and peripheral edema.   Gastrointestinal: Positive for heartburn. Negative for abdominal pain, constipation, diarrhea, hematochezia and nausea.   Breasts:  Negative for tenderness, breast mass and discharge.   Genitourinary: Positive for frequency. Negative for dysuria, genital sores, hematuria, pelvic pain, urgency, vaginal bleeding and vaginal discharge.   Musculoskeletal: Positive for joint swelling. Negative for arthralgias and myalgias.   Skin: Negative for rash.   Neurological: Positive for headaches. Negative for dizziness, weakness and paresthesias.   Psychiatric/Behavioral: Negative for mood changes. The patient is not nervous/anxious.         OBJECTIVE:   /88 (BP Location: Right arm, Patient Position: Chair, Cuff Size: Adult Large)   Pulse 81   Temp 98  F (36.7  C) (Oral)   Resp 14   Ht 1.702 m (5' 7\")   Wt 116.1 kg (256 lb)   LMP 05/10/2023 (Approximate)   SpO2 100%   Breastfeeding No   BMI 40.10 kg/m    Physical Exam  GENERAL: healthy, alert and no distress  EYES: Eyes grossly normal to inspection, PERRL and conjunctivae and sclerae normal  HENT: ear canals and TM's normal, nose and mouth without ulcers or lesions  NECK: no adenopathy, no asymmetry, masses, or scars and thyroid normal to palpation  RESP: lungs clear to auscultation - no rales, rhonchi or wheezes  Pelvic - pap obtained and send .    CV: regular rate and rhythm, normal S1 S2, no S3 or S4, no murmur, click or rub, no peripheral edema and peripheral pulses strong  ABDOMEN: soft, nontender, no " hepatosplenomegaly, no masses and bowel sounds normal  MS: no gross musculoskeletal defects noted, no edema  SKIN: no suspicious lesions or rashes  NEURO: Normal strength and tone, mentation intact and speech normal  PSYCH: mentation appears normal, affect normal/bright    Diagnostic Test Results:  Labs reviewed in Epic    ASSESSMENT/PLAN:   (Z00.00) Routine general medical examination at a health care facility  Comment: Discussed healthy eating   Discussed maintaining ideal weight   Plan: Discussed regular exercise .    (Z12.4) Cervical cancer screening  Comment:   Plan: Pap Screen reflex to HPV if ASCUS - recommend         age 25 - 29, HPV Hold (Lab Only)            (Z13.9) Screening for condition  Comment:   Plan: Lipid panel reflex to direct LDL Fasting,         Hemoglobin A1c, TSH with free T4 reflex            (E66.01,  Z68.41) Class 3 severe obesity without serious comorbidity with body mass index (BMI) of 40.0 to 44.9 in adult, unspecified obesity type (H)  Comment:   Plan: phentermine (ADIPEX-P) 37.5 MG tablet        Discussed medication side effects .      Patient has been advised of split billing requirements and indicates understanding: Yes      COUNSELING:  Reviewed preventive health counseling, as reflected in patient instructions       Regular exercise       Healthy diet/nutrition       Vision screening        She reports that she has never smoked. She has never used smokeless tobacco.          Joie Shepherd MD  [facility]  Answers for HPI/ROS submitted by the patient on 6/5/2023  If you checked off any problems, how difficult have these problems made it for you to do your work, take care of things at home, or get along with other people?: Not difficult at all  PHQ9 TOTAL SCORE: 3

## 2023-06-09 LAB
BKR LAB AP GYN ADEQUACY: NORMAL
BKR LAB AP GYN INTERPRETATION: NORMAL
BKR LAB AP HPV REFLEX: NORMAL
BKR LAB AP PREVIOUS ABNORMAL: NORMAL
PATH REPORT.COMMENTS IMP SPEC: NORMAL
PATH REPORT.COMMENTS IMP SPEC: NORMAL
PATH REPORT.RELEVANT HX SPEC: NORMAL

## 2023-10-25 ENCOUNTER — NURSE TRIAGE (OUTPATIENT)
Dept: FAMILY MEDICINE | Facility: CLINIC | Age: 28
End: 2023-10-25
Payer: COMMERCIAL

## 2023-10-25 NOTE — TELEPHONE ENCOUNTER
"Nurse Triage SBAR    Is this a 2nd Level Triage? YES, LICENSED PRACTITIONER REVIEW IS REQUIRED    Situation: Patient reports abdominal cramping x 6 days.     Background: Patient had unprotected sex on 10/14/2023 and not trying to conviece.    Assessment: Patient experiencing abdominal cramping that moves around from left and right side and upper and lower quadrant x 6 days that is intermittent and mild per patient.     Patient had \"pregnancy scare\". Patient took pregnancy test yesterday which was negative.  Patient's last menstrual period was 10/04/2023. Patient feels more emotional. Patient denies breast tenderness or nausea. Patient feels soreness in vaginal area and lower back pain. Patient denies STD concern as sex was with . Patient feels tired and like she may be urinating a bit more, but not sure. Patient denies any other urinary or vaginal symptoms.     Protocol Recommended Disposition:   Go To Office Now    Recommendation: Patient says she hasn't been taking Phentermine daily and she works with helping with xrays. Patient wants to know if she should be seen. Advised that it's likely too early to tell with pregnancy, but will route to primary care provider to advise if patient should be seen per nurse protocol of \"Go to Office now\"    Routed to provider    Does the patient meet one of the following criteria for ADS visit consideration? 16+ years old, with an [facility] PCP     TIP  Providers, please consider if this condition is appropriate for management at one of our Acute and Diagnostic Services sites.     If patient is a good candidate, please use dotphrase <dot>triageresponse and select Refer to ADS to document.  Reason for Disposition   Pregnant or could be pregnant (i.e., missed last menstrual period)    Additional Information   Negative: Passed out (i.e., fainted, collapsed and was not responding)   Negative: Shock suspected (e.g., cold/pale/clammy skin, too weak to stand, low BP, rapid pulse)   " "Negative: Sounds like a life-threatening emergency to the triager   Negative: Followed an abdomen (stomach) injury   Negative: Chest pain   Negative: Abdominal pain and pregnant < 20 weeks   Negative: Abdominal pain and pregnant 20 or more weeks   Negative: Pain is mainly in upper abdomen (if needed ask: 'is it mainly above the belly button?')   Negative: Abdomen bloating or swelling are main symptoms   Negative: SEVERE abdominal pain (e.g., excruciating)   Negative: Vomiting red blood or black (coffee ground) material   Negative: Blood in bowel movements  (Exception: Blood on surface of BM with constipation.)   Negative: Black or tarry bowel movements  (Exception: Chronic-unchanged black-grey BMs AND is taking iron pills or Pepto-Bismol.)   Negative: MILD TO MODERATE constant pain lasting > 2 hours   Negative: Vomiting bile (green color)   Negative: Patient sounds very sick or weak to the triager   Negative: Fever > 100.0 F (37.8 C) and has diabetes mellitus or a weak immune system (e.g., HIV positive, cancer chemotherapy, organ transplant, splenectomy, chronic steroids)   Negative: Fever > 100.0 F (37.8 C) and bedridden (e.g., CVA, chronic illness, recovering from surgery)   Negative: Fever > 101 F (38.3 C) and over 60 years of age   Negative: Blood in urine (red, pink, or tea-colored)   Negative: Fever > 103 F (39.4 C)   Negative: Vomiting and abdomen looks much more swollen than usual   Negative: White of the eyes have turned yellow (i.e., jaundice)    Answer Assessment - Initial Assessment Questions  1. LOCATION: \"Where does it hurt?\"       Abdominal pain.  2. RADIATION: \"Does the pain shoot anywhere else?\" (e.g., chest, back)      No.   3. ONSET: \"When did the pain begin?\" (e.g., minutes, hours or days ago)       6 days.   4. SUDDEN: \"Gradual or sudden onset?\"      Gradual  5. PATTERN \"Does the pain come and go, or is it constant?\"     - If it comes and goes: \"How long does it last?\" \"Do you have pain now?\"    " "  (Note: Comes and goes means the pain is intermittent. It goes away completely between bouts.)     - If constant: \"Is it getting better, staying the same, or getting worse?\"       (Note: Constant means the pain never goes away completely; most serious pain is constant and gets worse.)       Intermittent  6. SEVERITY: \"How bad is the pain?\"  (e.g., Scale 1-10; mild, moderate, or severe)     - MILD (1-3): Doesn't interfere with normal activities, abdomen soft and not tender to touch.      - MODERATE (4-7): Interferes with normal activities or awakens from sleep, abdomen tender to touch.      - SEVERE (8-10): Excruciating pain, doubled over, unable to do any normal activities.        Mild.   7. RECURRENT SYMPTOM: \"Have you ever had this type of stomach pain before?\" If Yes, ask: \"When was the last time?\" and \"What happened that time?\"       no  8. CAUSE: \"What do you think is causing the stomach pain?\"      Possible pregnancy   9. RELIEVING/AGGRAVATING FACTORS: \"What makes it better or worse?\" (e.g., antacids, bending or twisting motion, bowel movement)      Not sure  10. OTHER SYMPTOMS: \"Do you have any other symptoms?\" (e.g., back pain, diarrhea, fever, urination pain, vomiting)        Urination   11. PREGNANCY: \"Is there any chance you are pregnant?\" \"When was your last menstrual period?\"        Not sure, LMP 10/04/2023.    Protocols used: Abdominal Pain - Female-A-[place]    "

## 2023-10-26 NOTE — TELEPHONE ENCOUNTER
Writer called patient, made appointment for follow up.  Appointments in Next Year      Oct 27, 2023  3:00 PM  (Arrive by 2:40 PM)  Provider Visit with Susan Rachele Haase, APRN CNP  [facility] ([facility] [place] ) [phone]

## 2023-10-26 NOTE — TELEPHONE ENCOUNTER
If patient is experiencing vaginal bleeding or abdominal pain she will need evaluation .     Thanks   Joie Shepherd

## 2023-10-27 ENCOUNTER — OFFICE VISIT (OUTPATIENT)
Dept: FAMILY MEDICINE | Facility: CLINIC | Age: 28
End: 2023-10-27
Payer: COMMERCIAL

## 2023-10-27 VITALS
TEMPERATURE: 98.3 F | HEIGHT: 67 IN | SYSTOLIC BLOOD PRESSURE: 122 MMHG | WEIGHT: 257 LBS | OXYGEN SATURATION: 100 % | BODY MASS INDEX: 40.34 KG/M2 | HEART RATE: 82 BPM | RESPIRATION RATE: 18 BRPM | DIASTOLIC BLOOD PRESSURE: 86 MMHG

## 2023-10-27 DIAGNOSIS — R10.2 PELVIC PAIN IN FEMALE: ICD-10-CM

## 2023-10-27 DIAGNOSIS — N92.6 IRREGULAR MENSES: Primary | ICD-10-CM

## 2023-10-27 LAB
ALBUMIN UR-MCNC: NEGATIVE MG/DL
APPEARANCE UR: CLEAR
BILIRUB UR QL STRIP: NEGATIVE
CLUE CELLS: ABNORMAL
COLOR UR AUTO: YELLOW
GLUCOSE UR STRIP-MCNC: NEGATIVE MG/DL
HCG INTACT+B SERPL-ACNC: <1 MIU/ML
HCG SERPL QL: NEGATIVE
HCG UR QL: NEGATIVE
HGB UR QL STRIP: NEGATIVE
KETONES UR STRIP-MCNC: NEGATIVE MG/DL
LEUKOCYTE ESTERASE UR QL STRIP: NEGATIVE
NITRATE UR QL: NEGATIVE
PH UR STRIP: 7 [PH] (ref 5–7)
SP GR UR STRIP: 1.01 (ref 1–1.03)
TRICHOMONAS, WET PREP: ABNORMAL
UROBILINOGEN UR STRIP-ACNC: 0.2 E.U./DL
WBC'S/HIGH POWER FIELD, WET PREP: ABNORMAL
YEAST, WET PREP: ABNORMAL

## 2023-10-27 PROCEDURE — 81003 URINALYSIS AUTO W/O SCOPE: CPT | Performed by: NURSE PRACTITIONER

## 2023-10-27 PROCEDURE — 81025 URINE PREGNANCY TEST: CPT | Performed by: NURSE PRACTITIONER

## 2023-10-27 PROCEDURE — 87491 CHLMYD TRACH DNA AMP PROBE: CPT | Performed by: NURSE PRACTITIONER

## 2023-10-27 PROCEDURE — 84703 CHORIONIC GONADOTROPIN ASSAY: CPT | Performed by: NURSE PRACTITIONER

## 2023-10-27 PROCEDURE — 87210 SMEAR WET MOUNT SALINE/INK: CPT | Performed by: NURSE PRACTITIONER

## 2023-10-27 PROCEDURE — 36415 COLL VENOUS BLD VENIPUNCTURE: CPT | Performed by: NURSE PRACTITIONER

## 2023-10-27 PROCEDURE — 99214 OFFICE O/P EST MOD 30 MIN: CPT | Performed by: NURSE PRACTITIONER

## 2023-10-27 PROCEDURE — 87591 N.GONORRHOEAE DNA AMP PROB: CPT | Performed by: NURSE PRACTITIONER

## 2023-10-27 PROCEDURE — 84702 CHORIONIC GONADOTROPIN TEST: CPT | Performed by: NURSE PRACTITIONER

## 2023-10-27 ASSESSMENT — ANXIETY QUESTIONNAIRES
2. NOT BEING ABLE TO STOP OR CONTROL WORRYING: NOT AT ALL
7. FEELING AFRAID AS IF SOMETHING AWFUL MIGHT HAPPEN: NOT AT ALL
GAD7 TOTAL SCORE: 0
GAD7 TOTAL SCORE: 0
1. FEELING NERVOUS, ANXIOUS, OR ON EDGE: NOT AT ALL
IF YOU CHECKED OFF ANY PROBLEMS ON THIS QUESTIONNAIRE, HOW DIFFICULT HAVE THESE PROBLEMS MADE IT FOR YOU TO DO YOUR WORK, TAKE CARE OF THINGS AT HOME, OR GET ALONG WITH OTHER PEOPLE: NOT DIFFICULT AT ALL
3. WORRYING TOO MUCH ABOUT DIFFERENT THINGS: NOT AT ALL
6. BECOMING EASILY ANNOYED OR IRRITABLE: NOT AT ALL
4. TROUBLE RELAXING: NOT AT ALL
5. BEING SO RESTLESS THAT IT IS HARD TO SIT STILL: NOT AT ALL

## 2023-10-27 ASSESSMENT — PATIENT HEALTH QUESTIONNAIRE - PHQ9
SUM OF ALL RESPONSES TO PHQ QUESTIONS 1-9: 2
10. IF YOU CHECKED OFF ANY PROBLEMS, HOW DIFFICULT HAVE THESE PROBLEMS MADE IT FOR YOU TO DO YOUR WORK, TAKE CARE OF THINGS AT HOME, OR GET ALONG WITH OTHER PEOPLE: NOT DIFFICULT AT ALL
SUM OF ALL RESPONSES TO PHQ QUESTIONS 1-9: 2

## 2023-10-27 ASSESSMENT — PAIN SCALES - GENERAL: PAINLEVEL: NO PAIN (0)

## 2023-10-27 NOTE — PROGRESS NOTES
Assessment & Plan     Irregular menses:  HCG qual neg, HCG quant <1, informed patient that she is not pregnancy.  - HCG qualitative, Blood ([id number])  - hCG Quantitative Pregnancy    Pelvic pain in female: unclear etiology:  UA and wet prep with normal results, pregnancy test negative.  Gonorrhea and chlamydia pending, will not empirically treat for this since no vaginal discharge or high risk behavior.  Will obtain transvaginal US, unable to obtain today due to availability, discussed with Ana that if pain increases to go to the ED otherwise have the US completed on Monday as scheduled, she is in agreement with the plan.  - UA Macroscopic with reflex to Microscopic and Culture - Lab Collect  - Wet prep - lab collect  - Chlamydia trachomatis PCR  - Neisseria gonorrhoeae PCR  - HCG qualitative urine  - US Pelvic Complete with Transvaginal    Will obtain transvaginal US, unable to obtain today due to availability, discussed with Ana that if pain increases to go to the ED otherwise have the US completed on Monday as scheduled, she is in agreement with the plan.    Susan Haase, APRN CNP  [facility] 28 [facility], presenting for the following health issues:  Sexual Problem (Abdominal Pain/Cramping, Low Back Pain x 1 week. )        10/27/2023     2:44 PM   Additional Questions   Roomed by Ashley Schrader CMA   Accompanied by Self       History of Present Illness       Reason for visit:  Abdomen Cramping  Symptom onset:  3-7 days ago  Symptoms include:  Abd cramping  Symptom intensity:  Mild  Symptom progression:  Staying the same  Had these symptoms before:  No  What makes it worse:  No  What makes it better:  No    She eats 0-1 servings of fruits and vegetables daily.She consumes 1 sweetened beverage(s) daily.She exercises with enough effort to increase her heart rate 9 or less minutes per day.  She exercises with enough effort to increase her heart rate 3 or less days per  "week. She is missing 5 dose(s) of medications per week.  She is not taking prescribed medications regularly due to side effects and remembering to take.     Ana presents with abdominal cramping and low back pain for the past 8-9 days.  She had unprotected intercourse on 10/14/2023 and is concerned about pregnancy, her LMP was 10/4/2023.  Her periods are usually every 28 days.  She denies vaginal discharge, dysuria, urinary frequency, fever.  Lower abdominal pain is rated at 3/10, intermittent cramping.  Denies nausea, vomiting.  Is , 1 sexual partner, is not concerned about STD's.           Review of Systems   CONSTITUTIONAL: NEGATIVE for fever, chills, change in weight  RESP: NEGATIVE for significant cough or SOB  CV: NEGATIVE for chest pain, palpitations or peripheral edema  GI: NEGATIVE for nausea, abdominal pain, heartburn, or change in bowel habits  : see HPI  PSYCHIATRIC: NEGATIVE for changes in mood or affect      Objective    /86 (BP Location: Right arm, Patient Position: Sitting, Cuff Size: Adult Large)   Pulse 82   Temp 98.3  F (36.8  C) (Oral)   Resp 18   Ht 1.702 m (5' 7\")   Wt 116.6 kg (257 lb)   LMP 10/04/2023 (Exact Date)   SpO2 100%   BMI 40.25 kg/m    Body mass index is 40.25 kg/m .  Physical Exam   GENERAL: healthy, alert and no distress  NECK: no adenopathy, no asymmetry, masses, or scars and thyroid normal to palpation  RESP: lungs clear to auscultation - no rales, rhonchi or wheezes  CV: regular rate and rhythm, normal S1 S2, no S3 or S4, no murmur, click or rub, no peripheral edema and peripheral pulses strong  ABDOMEN: soft, tenderness upon palpation of pelvic area, no hepatosplenomegaly, no masses and bowel sounds normal   (female): normal female external genitalia, normal urethral meatus, vaginal mucosa, normal cervix/adnexa/uterus without masses or discharge, no CMT, tenderness with bimanual in right and left adnexa      Results for orders placed or performed in " visit on 10/27/23   UA Macroscopic with reflex to Microscopic and Culture - Lab Collect     Status: Normal    Specimen: Urine, Midstream   Result Value Ref Range    Color Urine Yellow Colorless, Straw, Light Yellow, Yellow    Appearance Urine Clear Clear    Glucose Urine Negative Negative mg/dL    Bilirubin Urine Negative Negative    Ketones Urine Negative Negative mg/dL    Specific Gravity Urine 1.010 1.003 - 1.035    Blood Urine Negative Negative    pH Urine 7.0 5.0 - 7.0    Protein Albumin Urine Negative Negative mg/dL    Urobilinogen Urine 0.2 0.2, 1.0 E.U./dL    Nitrite Urine Negative Negative    Leukocyte Esterase Urine Negative Negative    Narrative    Microscopic not indicated   HCG qualitative, Blood ([id number])     Status: Normal   Result Value Ref Range    hCG Serum Qualitative Negative Negative   hCG Quantitative Pregnancy     Status: Normal   Result Value Ref Range    hCG Quantitative <1 <5 mIU/mL   HCG qualitative urine     Status: Normal   Result Value Ref Range    hCG Urine Qualitative Negative Negative   Wet prep - lab collect     Status: Abnormal    Specimen: Vagina; Swab   Result Value Ref Range    Trichomonas Absent Absent    Yeast Absent Absent    Clue Cells Absent Absent    WBCs/high power field 1+ (A) None   Chlamydia trachomatis PCR     Status: Normal    Specimen: Vagina; Swab   Result Value Ref Range    Chlamydia trachomatis Negative Negative   Neisseria gonorrhoeae PCR     Status: Normal    Specimen: Vagina; Swab   Result Value Ref Range    Neisseria gonorrhoeae Negative Negative

## 2023-10-28 LAB
C TRACH DNA SPEC QL NAA+PROBE: NEGATIVE
N GONORRHOEA DNA SPEC QL NAA+PROBE: NEGATIVE

## 2023-10-30 ENCOUNTER — HOSPITAL ENCOUNTER (OUTPATIENT)
Dept: ULTRASOUND IMAGING | Facility: CLINIC | Age: 28
Discharge: HOME | End: 2023-10-30
Attending: NURSE PRACTITIONER | Admitting: NURSE PRACTITIONER
Payer: COMMERCIAL

## 2023-10-30 DIAGNOSIS — R10.2 PELVIC PAIN IN FEMALE: ICD-10-CM

## 2023-10-30 PROCEDURE — 76830 TRANSVAGINAL US NON-OB: CPT

## 2023-11-02 ENCOUNTER — LAB (OUTPATIENT)
Dept: LAB | Facility: CLINIC | Age: 28
End: 2023-11-02
Payer: COMMERCIAL

## 2023-11-02 DIAGNOSIS — Z13.9 SCREENING FOR CONDITION: ICD-10-CM

## 2023-11-02 LAB — HBA1C MFR BLD: 6.6 % (ref 0–5.6)

## 2023-11-02 PROCEDURE — 36415 COLL VENOUS BLD VENIPUNCTURE: CPT

## 2023-11-02 PROCEDURE — 83036 HEMOGLOBIN GLYCOSYLATED A1C: CPT

## 2023-11-02 PROCEDURE — 84443 ASSAY THYROID STIM HORMONE: CPT

## 2023-11-03 LAB — TSH SERPL DL<=0.005 MIU/L-ACNC: 2.74 UIU/ML (ref 0.3–4.2)

## 2023-11-06 ENCOUNTER — TELEPHONE (OUTPATIENT)
Dept: FAMILY MEDICINE | Facility: CLINIC | Age: 28
End: 2023-11-06
Payer: COMMERCIAL

## 2023-11-06 NOTE — TELEPHONE ENCOUNTER
As her blood pregnancy test is negative can say she is not pregnant .  Option to see obgyn for irregular periods .    Joie

## 2023-11-06 NOTE — TELEPHONE ENCOUNTER
----- Message from Joie Shepherd MD sent at 11/3/2023  8:12 PM CDT -----  Ana     Your A1C return back elevated suggestive of type 2 diabetes .   Will recommend clinic visit so we can discuss alternative medication and treatment .    Thanks   Joie Shepherd MD.

## 2023-11-06 NOTE — TELEPHONE ENCOUNTER
Writer calls patient to discuss option of seeing Ob/gyn. Patient declines option to see Ob/gyn at this time.

## 2023-11-06 NOTE — TELEPHONE ENCOUNTER
Writer called patient back, talked about her A1c and need for appointment. Appointment made.  Appointments in Next Year      Nov 13, 2023  9:30 AM  (Arrive by 9:10 AM)  Provider Visit with Joie Shepherd MD  [facility] ([facility] ) [phone]          Patient also notes she was seen recently by NP Haase. She is still having Abdominal pain, she still has not started her menses, she states she is very regular on 28 days. She should have had it start on 11/1/23. She did a home pregnancy test which was negative and tests here were negative when she saw NP Haase on 10/27/23.   Patient states this feeling and lateness of menses is similar to when she was pregnant with her first two children. Patient is wondering if she needs additional labs done?  Will forward to PCP.

## 2023-11-12 ASSESSMENT — ANXIETY QUESTIONNAIRES
3. WORRYING TOO MUCH ABOUT DIFFERENT THINGS: NOT AT ALL
GAD7 TOTAL SCORE: 0
6. BECOMING EASILY ANNOYED OR IRRITABLE: NOT AT ALL
5. BEING SO RESTLESS THAT IT IS HARD TO SIT STILL: NOT AT ALL
2. NOT BEING ABLE TO STOP OR CONTROL WORRYING: NOT AT ALL
7. FEELING AFRAID AS IF SOMETHING AWFUL MIGHT HAPPEN: NOT AT ALL
GAD7 TOTAL SCORE: 0
1. FEELING NERVOUS, ANXIOUS, OR ON EDGE: NOT AT ALL
4. TROUBLE RELAXING: NOT AT ALL
IF YOU CHECKED OFF ANY PROBLEMS ON THIS QUESTIONNAIRE, HOW DIFFICULT HAVE THESE PROBLEMS MADE IT FOR YOU TO DO YOUR WORK, TAKE CARE OF THINGS AT HOME, OR GET ALONG WITH OTHER PEOPLE: NOT DIFFICULT AT ALL

## 2023-11-12 ASSESSMENT — PATIENT HEALTH QUESTIONNAIRE - PHQ9
10. IF YOU CHECKED OFF ANY PROBLEMS, HOW DIFFICULT HAVE THESE PROBLEMS MADE IT FOR YOU TO DO YOUR WORK, TAKE CARE OF THINGS AT HOME, OR GET ALONG WITH OTHER PEOPLE: NOT DIFFICULT AT ALL
SUM OF ALL RESPONSES TO PHQ QUESTIONS 1-9: 0
SUM OF ALL RESPONSES TO PHQ QUESTIONS 1-9: 0

## 2023-11-13 ENCOUNTER — OFFICE VISIT (OUTPATIENT)
Dept: FAMILY MEDICINE | Facility: CLINIC | Age: 28
End: 2023-11-13
Payer: COMMERCIAL

## 2023-11-13 VITALS
HEIGHT: 67 IN | HEART RATE: 70 BPM | OXYGEN SATURATION: 100 % | TEMPERATURE: 97.6 F | RESPIRATION RATE: 16 BRPM | WEIGHT: 252.8 LBS | DIASTOLIC BLOOD PRESSURE: 86 MMHG | BODY MASS INDEX: 39.68 KG/M2 | SYSTOLIC BLOOD PRESSURE: 118 MMHG

## 2023-11-13 DIAGNOSIS — R73.01 IMPAIRED FASTING GLUCOSE: Primary | ICD-10-CM

## 2023-11-13 DIAGNOSIS — Z28.39 BEHIND ON IMMUNIZATIONS: ICD-10-CM

## 2023-11-13 DIAGNOSIS — Z13.220 LIPID SCREENING: ICD-10-CM

## 2023-11-13 PROCEDURE — 90686 IIV4 VACC NO PRSV 0.5 ML IM: CPT | Performed by: FAMILY MEDICINE

## 2023-11-13 PROCEDURE — 99213 OFFICE O/P EST LOW 20 MIN: CPT | Mod: 25 | Performed by: FAMILY MEDICINE

## 2023-11-13 PROCEDURE — 90471 IMMUNIZATION ADMIN: CPT | Performed by: FAMILY MEDICINE

## 2023-11-13 ASSESSMENT — ENCOUNTER SYMPTOMS
DIFFICULTY URINATING: 0
HEADACHES: 0
APPETITE CHANGE: 0
ACTIVITY CHANGE: 0
ARTHRALGIAS: 0
AGITATION: 0

## 2023-11-13 ASSESSMENT — PAIN SCALES - GENERAL: PAINLEVEL: NO PAIN (0)

## 2023-11-13 NOTE — PROGRESS NOTES
"  Assessment & Plan     Impaired fasting glucose  Discussed diet modification   Discussed option for medication deferred by patient .  - Hemoglobin A1c; Future  - working on weight loss .    Will recheck A1C in 3 months .    Lipid screening    - Lipid panel reflex to direct LDL Fasting; Future    Behind on immunizations  - INFLUENZA VACCINE IM > 6 MONTHS VALENT IIV4 (AFLURIA/FLUZONE)         BMI:   Estimated body mass index is 39.59 kg/m  as calculated from the following:    Height as of this encounter: 1.702 m (5' 7\").    Weight as of this encounter: 114.7 kg (252 lb 12.8 oz).   Weight management plan: Discussed healthy diet and exercise guidelines      Joie Shepherd MD  [facility] PAIGE Perez is a 28 year old, presenting for the following health issues:  Diabetes (Follow Up - No Concerns)        11/13/2023     9:16 AM   Additional Questions   Roomed by CATHY Juarez   Accompanied by Self       History of Present Illness       Reason for visit:  Go over A1C    She eats 0-1 servings of fruits and vegetables daily.She consumes 0 sweetened beverage(s) daily.She exercises with enough effort to increase her heart rate 20 to 29 minutes per day.  She exercises with enough effort to increase her heart rate 4 days per week.   She is not taking prescribed medications regularly due to side effects and remembering to take.           Review of Systems   Constitutional:  Negative for activity change and appetite change.   Genitourinary:  Negative for difficulty urinating and dyspareunia.   Musculoskeletal:  Negative for arthralgias.   Neurological:  Negative for headaches.   Psychiatric/Behavioral:  Negative for agitation and behavioral problems.             Objective    /86 (BP Location: Right arm, Patient Position: Sitting, Cuff Size: Adult Large)   Pulse 70   Temp 97.6  F (36.4  C) (Oral)   Resp 16   Ht 1.702 m (5' 7\")   Wt 114.7 kg (252 lb 12.8 oz)   LMP 11/07/2023 (Exact " Date)   SpO2 100%   Breastfeeding No   BMI 39.59 kg/m    Body mass index is 39.59 kg/m .  Physical Exam  Constitutional:       Appearance: Normal appearance.   HENT:      Head: Normocephalic.   Cardiovascular:      Rate and Rhythm: Normal rate.   Pulmonary:      Effort: Pulmonary effort is normal.   Abdominal:      General: Abdomen is flat.   Musculoskeletal:         General: Normal range of motion.   Skin:     General: Skin is warm.   Neurological:      General: No focal deficit present.      Mental Status: She is alert.   Psychiatric:         Mood and Affect: Mood normal.           Answers submitted by the patient for this visit:  Patient Health Questionnaire (Submitted on 11/12/2023)  If you checked off any problems, how difficult have these problems made it for you to do your work, take care of things at home, or get along with other people?: Not difficult at all  PHQ9 TOTAL SCORE: 0  PAOLO-7 (Submitted on 11/12/2023)  PAOLO 7 TOTAL SCORE: 0  General Questionnaire (Submitted on 11/12/2023)  Chief Complaint: Chronic problems general questions HPI Form  What is the reason for your visit today? : Go over A1C  How many servings of fruits and vegetables do you eat daily?: 0-1  On average, how many sweetened beverages do you drink each day (Examples: soda, juice, sweet tea, etc.  Do NOT count diet or artificially sweetened beverages)?: 0  How many minutes a day do you exercise enough to make your heart beat faster?: 20 to 29  How many days a week do you exercise enough to make your heart beat faster?: 4  How many days per week do you miss taking your medication?: 3  What makes it hard for you to take your medication every day?: side effects, remembering to take

## 2024-08-31 ENCOUNTER — HEALTH MAINTENANCE LETTER (OUTPATIENT)
Age: 29
End: 2024-08-31

## 2024-09-24 ENCOUNTER — TRANSFERRED RECORDS (OUTPATIENT)
Dept: HEALTH INFORMATION MANAGEMENT | Facility: CLINIC | Age: 29
End: 2024-09-24

## 2024-10-23 ASSESSMENT — PATIENT HEALTH QUESTIONNAIRE - PHQ9
10. IF YOU CHECKED OFF ANY PROBLEMS, HOW DIFFICULT HAVE THESE PROBLEMS MADE IT FOR YOU TO DO YOUR WORK, TAKE CARE OF THINGS AT HOME, OR GET ALONG WITH OTHER PEOPLE: NOT DIFFICULT AT ALL
SUM OF ALL RESPONSES TO PHQ QUESTIONS 1-9: 5
SUM OF ALL RESPONSES TO PHQ QUESTIONS 1-9: 5

## 2024-10-24 ENCOUNTER — OFFICE VISIT (OUTPATIENT)
Dept: FAMILY MEDICINE | Facility: CLINIC | Age: 29
End: 2024-10-24
Payer: COMMERCIAL

## 2024-10-24 VITALS
DIASTOLIC BLOOD PRESSURE: 80 MMHG | HEART RATE: 73 BPM | RESPIRATION RATE: 20 BRPM | SYSTOLIC BLOOD PRESSURE: 127 MMHG | BODY MASS INDEX: 40.97 KG/M2 | TEMPERATURE: 97.7 F | HEIGHT: 67 IN | OXYGEN SATURATION: 99 % | WEIGHT: 261 LBS

## 2024-10-24 DIAGNOSIS — E11.9 TYPE 2 DIABETES MELLITUS WITHOUT COMPLICATION, WITHOUT LONG-TERM CURRENT USE OF INSULIN (H): Primary | ICD-10-CM

## 2024-10-24 DIAGNOSIS — Z13.220 LIPID SCREENING: ICD-10-CM

## 2024-10-24 LAB
ALBUMIN SERPL BCG-MCNC: 4.2 G/DL (ref 3.5–5.2)
ALP SERPL-CCNC: 96 U/L (ref 40–150)
ALT SERPL W P-5'-P-CCNC: 15 U/L (ref 0–50)
ANION GAP SERPL CALCULATED.3IONS-SCNC: 10 MMOL/L (ref 7–15)
AST SERPL W P-5'-P-CCNC: 16 U/L (ref 0–45)
BILIRUB SERPL-MCNC: 0.2 MG/DL
BUN SERPL-MCNC: 10.1 MG/DL (ref 6–20)
CALCIUM SERPL-MCNC: 9.3 MG/DL (ref 8.8–10.4)
CHLORIDE SERPL-SCNC: 105 MMOL/L (ref 98–107)
CHOLEST SERPL-MCNC: 183 MG/DL
CREAT SERPL-MCNC: 0.77 MG/DL (ref 0.51–0.95)
CREAT UR-MCNC: 189 MG/DL
EGFRCR SERPLBLD CKD-EPI 2021: >90 ML/MIN/1.73M2
EST. AVERAGE GLUCOSE BLD GHB EST-MCNC: 166 MG/DL
FASTING STATUS PATIENT QL REPORTED: YES
FASTING STATUS PATIENT QL REPORTED: YES
GLUCOSE SERPL-MCNC: 145 MG/DL (ref 70–99)
HBA1C MFR BLD: 7.4 % (ref 0–5.6)
HCO3 SERPL-SCNC: 24 MMOL/L (ref 22–29)
HDLC SERPL-MCNC: 34 MG/DL
LDLC SERPL CALC-MCNC: 126 MG/DL
MICROALBUMIN UR-MCNC: 25.4 MG/L
MICROALBUMIN/CREAT UR: 13.44 MG/G CR (ref 0–25)
NONHDLC SERPL-MCNC: 149 MG/DL
POTASSIUM SERPL-SCNC: 4.4 MMOL/L (ref 3.4–5.3)
PROT SERPL-MCNC: 7.5 G/DL (ref 6.4–8.3)
SODIUM SERPL-SCNC: 139 MMOL/L (ref 135–145)
TRIGL SERPL-MCNC: 114 MG/DL

## 2024-10-24 PROCEDURE — 82570 ASSAY OF URINE CREATININE: CPT | Performed by: FAMILY MEDICINE

## 2024-10-24 PROCEDURE — 80053 COMPREHEN METABOLIC PANEL: CPT | Performed by: FAMILY MEDICINE

## 2024-10-24 PROCEDURE — 91320 SARSCV2 VAC 30MCG TRS-SUC IM: CPT | Performed by: FAMILY MEDICINE

## 2024-10-24 PROCEDURE — 90480 ADMN SARSCOV2 VAC 1/ONLY CMP: CPT | Performed by: FAMILY MEDICINE

## 2024-10-24 PROCEDURE — 82043 UR ALBUMIN QUANTITATIVE: CPT | Performed by: FAMILY MEDICINE

## 2024-10-24 PROCEDURE — 99207 PR FOOT EXAM NO CHARGE: CPT | Performed by: FAMILY MEDICINE

## 2024-10-24 PROCEDURE — 99214 OFFICE O/P EST MOD 30 MIN: CPT | Performed by: FAMILY MEDICINE

## 2024-10-24 PROCEDURE — 36415 COLL VENOUS BLD VENIPUNCTURE: CPT | Performed by: FAMILY MEDICINE

## 2024-10-24 PROCEDURE — 80061 LIPID PANEL: CPT | Performed by: FAMILY MEDICINE

## 2024-10-24 PROCEDURE — 83036 HEMOGLOBIN GLYCOSYLATED A1C: CPT | Performed by: FAMILY MEDICINE

## 2024-10-24 RX ORDER — LANCETS
EACH MISCELLANEOUS
Qty: 100 EACH | Refills: 6 | Status: SHIPPED | OUTPATIENT
Start: 2024-10-24

## 2024-10-24 RX ORDER — METFORMIN HYDROCHLORIDE 500 MG/1
500 TABLET, EXTENDED RELEASE ORAL
Qty: 90 TABLET | Refills: 1 | Status: SHIPPED | OUTPATIENT
Start: 2024-10-24

## 2024-10-24 ASSESSMENT — ANXIETY QUESTIONNAIRES
6. BECOMING EASILY ANNOYED OR IRRITABLE: NOT AT ALL
8. IF YOU CHECKED OFF ANY PROBLEMS, HOW DIFFICULT HAVE THESE MADE IT FOR YOU TO DO YOUR WORK, TAKE CARE OF THINGS AT HOME, OR GET ALONG WITH OTHER PEOPLE?: NOT DIFFICULT AT ALL
7. FEELING AFRAID AS IF SOMETHING AWFUL MIGHT HAPPEN: NOT AT ALL
3. WORRYING TOO MUCH ABOUT DIFFERENT THINGS: NOT AT ALL
GAD7 TOTAL SCORE: 3
1. FEELING NERVOUS, ANXIOUS, OR ON EDGE: SEVERAL DAYS
GAD7 TOTAL SCORE: 3
4. TROUBLE RELAXING: MORE THAN HALF THE DAYS
5. BEING SO RESTLESS THAT IT IS HARD TO SIT STILL: NOT AT ALL
IF YOU CHECKED OFF ANY PROBLEMS ON THIS QUESTIONNAIRE, HOW DIFFICULT HAVE THESE PROBLEMS MADE IT FOR YOU TO DO YOUR WORK, TAKE CARE OF THINGS AT HOME, OR GET ALONG WITH OTHER PEOPLE: NOT DIFFICULT AT ALL
7. FEELING AFRAID AS IF SOMETHING AWFUL MIGHT HAPPEN: NOT AT ALL
2. NOT BEING ABLE TO STOP OR CONTROL WORRYING: NOT AT ALL
GAD7 TOTAL SCORE: 3

## 2024-10-24 NOTE — PROGRESS NOTES
"  Assessment & Plan     (E11.9) Type 2 diabetes mellitus without complication, without long-term current use of insulin (H)  (primary encounter diagnosis)  Comment:new diagnosis   Discussed diet modification   Discussed low cholesterol diet .   Plan: Comprehensive metabolic panel (BMP + Alb, Alk         Phos, ALT, AST, Total. Bili, TP), Albumin         Random Urine Quantitative with Creat Ratio,         metFORMIN (GLUCOPHAGE XR) 500 MG 24 hr tablet,         semaglutide (OZEMPIC) 2 MG/3ML pen, Adult         Diabetes Education  Referral, FOOT         EXAM, blood glucose monitoring (NO BRAND         SPECIFIED) meter device kit, blood glucose (NO         BRAND SPECIFIED) test strip, thin (NO BRAND         SPECIFIED) lancets, CANCELED: Hemoglobin A1c,         CANCELED: Lipid panel reflex to direct LDL         Fasting          Lab Results   Component Value Date    A1C 7.4 10/24/2024    A1C 6.6 11/02/2023    A1C 5.8 08/10/2022    A1C 5.5 05/08/2018    A1C 5.9 05/02/2016    A1C 5.5 12/30/2013        (Z13.220) Lipid screening  Comment:will obtain screening lipid panel       Involved in patient care and care coordination .    BMI  Estimated body mass index is 40.88 kg/m  as calculated from the following:    Height as of this encounter: 1.702 m (5' 7\").    Weight as of this encounter: 118.4 kg (261 lb).   Weight management plan: Discussed healthy diet and exercise guidelines        Dilcia Perez is a 29 year old, presenting for the following health issues:  Diabetes (Follow-up diabetes)        10/24/2024     9:23 AM   Additional Questions   Roomed by Madina Schaefer     History of Present Illness       Diabetes:   She presents for follow up of diabetes.    She is not checking blood glucose.        She is concerned about other.   She is having weight gain.            Reason for visit:  Diabetes and weight concerns    She eats 0-1 servings of fruits and vegetables daily.She consumes 1 sweetened beverage(s) daily.She " "exercises with enough effort to increase her heart rate 9 or less minutes per day.  She exercises with enough effort to increase her heart rate 3 or less days per week. She is missing 2 dose(s) of medications per week.  She is not taking prescribed medications regularly due to remembering to take.             Review of Systems  CONSTITUTIONAL: NEGATIVE for fever, chills, change in weight  INTEGUMENTARY/SKIN: NEGATIVE for worrisome rashes, moles or lesions  EYES: NEGATIVE for vision changes or irritation  ENT/MOUTH: NEGATIVE for ear, mouth and throat problems  RESP: NEGATIVE for significant cough or SOB  BREAST: NEGATIVE for masses, tenderness or discharge  CV: NEGATIVE for chest pain, palpitations or peripheral edema  GI: NEGATIVE for nausea, abdominal pain, heartburn, or change in bowel habits  : NEGATIVE for frequency, dysuria, or hematuria  MUSCULOSKELETAL: NEGATIVE for significant arthralgias or myalgia  NEURO: NEGATIVE for weakness, dizziness or paresthesias  ENDOCRINE: NEGATIVE for temperature intolerance, skin/hair changes  HEME: NEGATIVE for bleeding problems  PSYCHIATRIC: NEGATIVE for changes in mood or affect      Objective    /80 (BP Location: Right arm, Patient Position: Chair, Cuff Size: Adult Large)   Pulse 73   Temp 97.7  F (36.5  C) (Oral)   Resp 20   Ht 1.702 m (5' 7\")   Wt 118.4 kg (261 lb)   LMP 10/21/2024   SpO2 99%   BMI 40.88 kg/m    Body mass index is 40.88 kg/m .  Physical Exam   GENERAL: alert and no distress  EYES: Eyes grossly normal to inspection, PERRL and conjunctivae and sclerae normal  HENT: ear canals and TM's normal, nose and mouth without ulcers or lesions  NECK: no adenopathy, no asymmetry, masses, or scars  RESP: lungs clear to auscultation - no rales, rhonchi or wheezes  CV: regular rate and rhythm, normal S1 S2, no S3 or S4, no murmur, click or rub, no peripheral edema  ABDOMEN: soft, nontender, no hepatosplenomegaly, no masses and bowel sounds normal  MS: no " gross musculoskeletal defects noted, no edema  SKIN: no suspicious lesions or rashes  NEURO: Normal strength and tone, mentation intact and speech normal  PSYCH: mentation appears normal, affect normal/bright            Signed Electronically by: Joie Shepherd MD

## 2024-12-23 ENCOUNTER — VIRTUAL VISIT (OUTPATIENT)
Dept: FAMILY MEDICINE | Facility: CLINIC | Age: 29
End: 2024-12-23
Payer: COMMERCIAL

## 2024-12-23 DIAGNOSIS — K21.9 GASTROESOPHAGEAL REFLUX DISEASE, UNSPECIFIED WHETHER ESOPHAGITIS PRESENT: ICD-10-CM

## 2024-12-23 DIAGNOSIS — E11.9 TYPE 2 DIABETES MELLITUS WITHOUT COMPLICATION, WITHOUT LONG-TERM CURRENT USE OF INSULIN (H): ICD-10-CM

## 2024-12-23 DIAGNOSIS — Z32.01 PREGNANCY TEST POSITIVE: Primary | ICD-10-CM

## 2024-12-23 PROCEDURE — 99214 OFFICE O/P EST MOD 30 MIN: CPT | Mod: 95 | Performed by: NURSE PRACTITIONER

## 2024-12-23 RX ORDER — BLOOD-GLUCOSE METER
EACH MISCELLANEOUS
COMMUNITY
Start: 2024-10-24

## 2024-12-23 ASSESSMENT — ENCOUNTER SYMPTOMS: NAUSEA: 0

## 2024-12-23 NOTE — PATIENT INSTRUCTIONS
Please follow-up with OBGYN and Endocrinology Pharmacist to discuss your medications.    Please discontinue metformin, omeprazole, and semaglutide at this time.

## 2024-12-23 NOTE — PROGRESS NOTES
"  If patient has telephone visit, have they been educated on video visit as preferred visit method and offered to change to video visit? N/A        Instructions Relayed to Patient by Virtual Roomer:     Patient is active on [employer]:   Relayed following to patient: \"It looks like you are active on [email or web address], are you able to join the visit this way? If not, do you need us to send you a link now or would you like your provider to send a link via text or email when they are ready to initiate the visit?\"      Patient Confirmed they will join visit via: [employer]  Reminded patient to ensure they were logged on to virtual visit by arrival time listed.   Asked if patient has flexibility to initiate visit sooner than arrival time: patient stated yes, documented in appointment notes availability to initiate visit earlier than arrival time     If pediatric virtual visit, ensured pediatric patient along with parent/guardian will be present for video visit.     Patient offered the website [email or web address] and/or phone number to [employer] Help line: [phone]  Ana is a 29 year old who is being evaluated via a billable video visit.    How would you like to obtain your AVS? [email or web address]  If the video visit is dropped, the invitation should be resent by: Text to cell phone: [phone]  Will anyone else be joining your video visit? No      Assessment & Plan     1. Pregnancy test positive (Primary)    [facility] 11/22/24. Had vomiting approximately 10 days ago but has not had repeat episodes. Has had stomach aches, however, no vaginal bleeding or sharp pelvic pain (advised to seek ED care if she develops these symptoms or any other concerning symptoms). Advised on morning sickness lifestyle modifications (placed these in AVS as well).    Placed referral for her to establish with OBGYN and to discuss DM regimen with Endo MTM.    - Ob/Gyn  Referral; Future  - Med Therapy Management Referral    2. Type 2 diabetes " mellitus without complication, without long-term current use of insulin (H)    Currently on semaglutide and metformin. Reviewed ADA guidelines with her which recommends insulin as preferred agent for management of T1DM and T2DM in pregnancy (American Diabetes Association, 2020).   Will have her discontinue metformin and semaglutide until she sees MTM. Continue monitoring BG. Follow-up with PCP with any concerns in interim.    - Ob/Gyn  Referral; Future  - Med Therapy Management Referral    3. Gastroesophageal reflux disease, unspecified whether esophagitis present  Not currently on PPI. Instructed her not to restart and to employ lifestyle modifications (small meals, ambulate after meals etc.) Also informed patient that pregnancy can increase GERD symptoms.    Follow-up with OBGYN and MTM.      All questions/concerns addressed. Patient stated understanding/agreement to plan of care.        Note: Chart documentation was done in part with Dragon Voice Recognition software.  Although reviewed after completion, some word and grammatical errors may remain. Please contact author for any clarification or concerns.            MEDICATIONS:   Medications Discontinued During This Encounter   Medication Reason         metFORMIN (GLUCOPHAGE XR) 500 MG 24 hr tablet Contraindicated/pregnancy    omeprazole (PRILOSEC) 20 MG DR capsule Contraindicated/pregnancy    semaglutide (OZEMPIC) 2 MG/3ML pen Contraindicated/pregnancy     Patient Instructions   Please follow-up with OBGYN and Endocrinology Pharmacist to discuss your medications.    Please discontinue metformin, omeprazole, and semaglutide at this time.      American Diabetes Association; 14. Management of Diabetes in Pregnancy: Standards of Medical Care in Diabetes--2020. Diabetes Care 1 January 2020; 43 (Supplement_1): D476-J370. [email or web address]://[email or web address]/[email or web address]      Dilcia Perez is a 29 year old, presenting for the following health issues:  Abdominal Pain (X 1  day)      12/23/2024    11:14 AM   Additional Questions   Roomed by Itzel   Accompanied by sself         12/23/2024    11:14 AM   Patient Reported Additional Medications   Patient reports taking the following new medications none     History of Present Illness       Reason for visit:  Questions about medication due to pregnancy test  Symptom onset:  1-3 days ago  Symptoms include:  Stomachaches  Symptom intensity:  Mild  Symptom progression:  Staying the same  Had these symptoms before:  No  What makes it worse:  No  What makes it better:  No   She is taking medications regularly.       Recently took 4 pregnancy test and now having some abdomen pain cramping  LKMP- 11/22/24  Having some stomach aches, relatively mild. 1 episode of vomiting 10 days ago, none since. Able to keep food/liquids down.  Does not have OBGYN. Patient would like guidance re: her medications.      T2DM- Bgs ~145 in the morning  128 in the afternoon after meals  Currently on semaglutide and metformin.    Hx GERD- Not currently taking PPI. No other acute concerns/symptoms at time of exam.          Review of Systems   Gastrointestinal:  Negative for nausea.   Genitourinary:  Negative for pelvic pain and vaginal bleeding.     Constitutional, HEENT, cardiovascular, pulmonary, gi and gu systems are negative, except as otherwise noted.    Current Outpatient Medications   Medication Sig Dispense Refill    blood glucose (NO BRAND SPECIFIED) test strip Use to test blood sugar 1 times daily or as directed. To accompany: Blood Glucose Monitor Brands: per insurance. 100 strip 6    Blood Glucose Monitoring Suppl (ACCU-CHEK GUIDE) w/Device KIT       metFORMIN (GLUCOPHAGE XR) 500 MG 24 hr tablet Take 1 tablet (500 mg) by mouth daily (with dinner). 90 tablet 1    omeprazole (PRILOSEC) 20 MG DR capsule Take 1 capsule (20 mg) by mouth daily 30 capsule 0    semaglutide (OZEMPIC) 2 MG/3ML pen Inject 0.25 mg subcutaneously every 7 days. 3 mL 1    thin (NO BRAND  SPECIFIED) lancets Use with lanceting device. To accompany: Blood Glucose Monitor Brands: per insurance. 100 each 6     No current facility-administered medications for this visit.             Objective    Vitals - Patient Reported  Pain Score: Moderate Pain (4)  Pain Loc: Abdomen        Physical Exam   GENERAL: alert and no distress  EYES: Eyes grossly normal to inspection.  No discharge or erythema, or obvious scleral/conjunctival abnormalities.  RESP: No audible wheeze, cough, or visible cyanosis.    SKIN: Visible skin clear. No significant rash, abnormal pigmentation or lesions.  NEURO: Cranial nerves grossly intact.  Mentation and speech appropriate for age.  PSYCH: Appropriate affect, tone, and pace of words    Recent Results (from the past 12 weeks)   Comprehensive metabolic panel (BMP + Alb, Alk Phos, ALT, AST, Total. Bili, TP)    Collection Time: 10/24/24  9:50 AM   Result Value Ref Range    Sodium 139 135 - 145 mmol/L    Potassium 4.4 3.4 - 5.3 mmol/L    Carbon Dioxide (CO2) 24 22 - 29 mmol/L    Anion Gap 10 7 - 15 mmol/L    Urea Nitrogen 10.1 6.0 - 20.0 mg/dL    Creatinine 0.77 0.51 - 0.95 mg/dL    GFR Estimate >90 >60 mL/min/1.73m2    Calcium 9.3 8.8 - 10.4 mg/dL    Chloride 105 98 - 107 mmol/L    Glucose 145 (H) 70 - 99 mg/dL    Alkaline Phosphatase 96 40 - 150 U/L    AST 16 0 - 45 U/L    ALT 15 0 - 50 U/L    Protein Total 7.5 6.4 - 8.3 g/dL    Albumin 4.2 3.5 - 5.2 g/dL    Bilirubin Total 0.2 <=1.2 mg/dL    Patient Fasting > 8hrs? Yes    Hemoglobin A1c    Collection Time: 10/24/24  9:50 AM   Result Value Ref Range    Estimated Average Glucose 166 (H) <117 mg/dL    Hemoglobin A1C 7.4 (H) 0.0 - 5.6 %   Lipid panel reflex to direct LDL Fasting    Collection Time: 10/24/24  9:50 AM   Result Value Ref Range    Cholesterol 183 <200 mg/dL    Triglycerides 114 <150 mg/dL    Direct Measure HDL 34 (L) >=50 mg/dL    LDL Cholesterol Calculated 126 (H) <100 mg/dL    Non HDL Cholesterol 149 (H) <130 mg/dL    Patient  Fasting > 8hrs? Yes    Albumin Random Urine Quantitative with Creat Ratio    Collection Time: 10/24/24  9:56 AM   Result Value Ref Range    Creatinine Urine mg/dL 189.0 mg/dL    Albumin Urine mg/L 25.4 mg/L    Albumin Urine mg/g Cr 13.44 0.00 - 25.00 mg/g Cr           Video-Visit Details    Type of service:  Video Visit , Video length ~14 minutes.    Originating Location (pt. Location): Home    Distant Location (provider location):  On-site  Platform used for Video Visit: [facility]  Signed Electronically by: KELLIE Bynum CNP

## 2024-12-24 ENCOUNTER — TELEPHONE (OUTPATIENT)
Dept: FAMILY MEDICINE | Facility: CLINIC | Age: 29
End: 2024-12-24
Payer: COMMERCIAL

## 2024-12-24 NOTE — TELEPHONE ENCOUNTER
[facility] referral from: [facility] visit (referral by provider)    [place] referral outreach attempt #1 on December 24, 2024 at 1:52 PM      Outcome: Spoke with patient said she thought about it and doesn't want to sched. will call if she changes her mind.    Use [employer] for the carrier/Plan on the flowsheet          Lucy Arevalo [place]   [phone]

## 2024-12-27 ENCOUNTER — VIRTUAL VISIT (OUTPATIENT)
Dept: EDUCATION SERVICES | Facility: CLINIC | Age: 29
End: 2024-12-27
Payer: COMMERCIAL

## 2024-12-27 DIAGNOSIS — E11.9 TYPE 2 DIABETES MELLITUS WITHOUT COMPLICATION, WITHOUT LONG-TERM CURRENT USE OF INSULIN (H): Primary | ICD-10-CM

## 2024-12-27 PROCEDURE — G0108 DIAB MANAGE TRN  PER INDIV: HCPCS | Mod: 93 | Performed by: DIETITIAN, REGISTERED

## 2024-12-27 NOTE — PROGRESS NOTES
Diabetes Self-Management Education & Support  Presents for:    Type of Service: Telephone Visit  Originating Location (Patient Location): Home  Distant Location (Provider Location):  [facility] [employer] [facility] [place]  Mode of Communication:  Telephone  Telephone Visit Start Time:  200  Telephone Visit End Time (telephone visit stop time): 241  How would patient like to obtain AVS? MyChart    Assessment  Ana is focusing on her health and getting blood sugars very well controlled.  Has and accu check blood sugars and reviewed how to assess numbers and goals, download the [employer] delaney to keep track of blood sugar averages.  Reviewed diet and carbohydrate awareness.  Is doing well on Ozempic and tolerating 0.25mg very well (will likely increase to 0.5mg soon).      Monitorin in the morning,   After dinner dinner, down to 130s     Diet / food notes:   trying to lower carbohydrates, increasing protein, veggies,Soups  Talked about complex carbohydrates, fiber etc.   Doesn't likes breakfast:  but added a kale no sugar yogurt drink with berries + honey.  Likes coffee but likes sugar   Lunch - adding balsamic vinegar / cucumbers, montez tomatoes + soup with chicken / beef  Almonds, prunes  (talked about fiber filled foods / foods)     Patient's most recent  is not meeting goal of <7.0  Lab Results   Component Value Date    A1C 7.4 10/24/2024    A1C 5.5 2018     Diabetes knowledge and skills assessment:   Patient is knowledgeable in diabetes management concepts related to: Healthy Eating, Being Active, Monitoring, Taking Medication, Problem Solving, Reducing Risks, and Healthy Coping  Based on learning assessment above, most appropriate setting for further diabetes education would be: Individual setting.    Care Plan and Education Provided:  Healthy Eating: Balanced meals, Being Active: Relationship of activity to glucose, Monitoring: Individual glucose targets, Taking Medication: Action of prescribed  "medication(s), Problem Solving: When to call a health care provider, Reducing Risks: Complications of diabetes and Goal for A1c, how it relates to glucose and how often to check, and Healthy Coping: Benefits of making appropriate lifestyle changes    Patient verbalized understanding of diabetes self-management education concepts discussed, opportunities for ongoing education and support, and recommendations provided today.    Plan  Continue with positive diet & lifestyle changes    Needs refills on ozempic and metformin, will route to PCP   Follow-up: Friday 2/21 phone, PCP 2/24    See Care Plan for co-developed, patient-state behavior change goals.    Education Materials Provided:  Healthy Living with Diabetes book   American Diabetes Association; How to Thrive: A Guide for Your Journey with Diabetes.     Subjective/Objective  Ana is an 29 year old year old, presenting for the following diabetes education related to: Diabetes education in the past 24mo: (Patient-Rptd) No  Diabetes type: (Patient-Rptd) Type 2  Disease course: (Patient-Rptd) Getting harder to manage  How confident are you filling out medical forms by yourself:: (Patient-Rptd) Quite a bit  Cultural Influences/Ethnic Background:  Not  or     Diabetes Symptoms & Complications:  Diabetes Related Symptoms: (Patient-Rptd) Fatigue, Polydipsia (increased thirst), Polyphagia (increased hunger)  Weight trend: (Patient-Rptd) Increasing  Symptom course: (Patient-Rptd) Worsening  Disease course: (Patient-Rptd) Getting harder to manage     Patient Problem List and Family Medical History reviewed for relevant medical history, current medical status, and diabetes risk factors.    Vitals:  Estimated body mass index is 40.88 kg/m  as calculated from the following:    Height as of 10/24/24: 1.702 m (5' 7\").    Weight as of 10/24/24: 118.4 kg (261 lb).   Last 3 BP:   BP Readings from Last 3 Encounters:   10/24/24 127/80   11/13/23 118/86   10/27/23 " 122/86     History   Smoking Status    Never   Smokeless Tobacco    Never     Labs:  Lab Results   Component Value Date     10/24/2024     07/31/2019     HDL Cholesterol   Date Value Ref Range Status   07/31/2019 37 (L) >49 mg/dL Final     Direct Measure HDL   Date Value Ref Range Status   10/24/2024 34 (L) >=50 mg/dL Final   ]  GFR Estimate   Date Value Ref Range Status   10/24/2024 >90 >60 mL/min/1.73m2 Final     Comment:     eGFR calculated using 2021 CKD-EPI equation.   12/16/2017 >90 >60 mL/min/1.7m2 Final     Comment:     Non  GFR Calc     Lab Results   Component Value Date    CR 0.77 10/24/2024    CR 0.66 12/16/2017     Healthy Eating:  Cultural/Hindu diet restrictions?: (Patient-Rptd) No  How many times a week on average do you eat food made away from home (restaurant/take-out)?: (Patient-Rptd) 1  Meals include: (Patient-Rptd) Lunch, Dinner, Morning Snack, Evening Snack  Beverages: (Patient-Rptd) Water, Tea, Coffee    Being Active:  Barrier to exercise: (Patient-Rptd) Time    Monitoring:  Blood Glucose Meter: (Patient-Rptd) Accu-chek  Times checking blood sugar at home (number): (Patient-Rptd) 2  Times checking blood sugar at home (per): (Patient-Rptd) Day    Taking Medications:  Current Treatments: (Patient-Rptd) Insulin Injections, Oral Medication (taken by mouth)    Problem Solving:  Yes      Reducing Risks:  Has dilated eye exam at least once a year?: (Patient-Rptd) Yes  Sees dentist every 6 months?: (Patient-Rptd) No  Feet checked by healthcare provider in the last year?: (Patient-Rptd) Yes    Healthy Coping:  Informal Support system:: (Patient-Rptd) Spouse    Glenis White RD  Time Spent: 41 minutes  Encounter Type: Individual    Any diabetes medication dose changes were made via the CDCES Standing Orders under the patient's referring provider.

## 2024-12-27 NOTE — LETTER
2024         RE: Ana Mauricio  [id number] [facility] [id number]        Dear Colleague,    Thank you for referring your patient, Ana Mauricio, to the [facility]. Please see a copy of my visit note below.    Diabetes Self-Management Education & Support  Presents for:    Type of Service: Telephone Visit  Originating Location (Patient Location): Home  Distant Location (Provider Location): [facility]  Mode of Communication:  Telephone  Telephone Visit Start Time:  200  Telephone Visit End Time (telephone visit stop time): 241  How would patient like to obtain AVS? MyChart    Assessment  Ana is focusing on her health and getting blood sugars very well controlled.  Has and accu check blood sugars and reviewed how to assess numbers and goals, download the Acumen delaney to keep track of blood sugar averages.  Reviewed diet and carbohydrate awareness.  Is doing well on Ozempic and tolerating 0.25mg very well (will likely increase to 0.5mg soon).      Monitorin in the morning,   After dinner dinner, down to 130s     Diet / food notes:   trying to lower carbohydrates, increasing protein, veggies,Soups  Talked about complex carbohydrates, fiber etc.   Doesn't likes breakfast:  but added a kale no sugar yogurt drink with berries + honey.  Likes coffee but likes sugar   Lunch - adding balsamic vinegar / cucumbers, montez tomatoes + soup with chicken / beef  Almonds, prunes  (talked about fiber filled foods / foods)     Patient's most recent  is not meeting goal of <7.0  Lab Results   Component Value Date    A1C 7.4 10/24/2024    A1C 5.5 2018     Diabetes knowledge and skills assessment:   Patient is knowledgeable in diabetes management concepts related to: Healthy Eating, Being Active, Monitoring, Taking Medication, Problem Solving, Reducing Risks, and Healthy Coping  Based on learning assessment above, most appropriate setting  for further diabetes education would be: Individual setting.    Care Plan and Education Provided:  Healthy Eating: Balanced meals, Being Active: Relationship of activity to glucose, Monitoring: Individual glucose targets, Taking Medication: Action of prescribed medication(s), Problem Solving: When to call a health care provider, Reducing Risks: Complications of diabetes and Goal for A1c, how it relates to glucose and how often to check, and Healthy Coping: Benefits of making appropriate lifestyle changes    Patient verbalized understanding of diabetes self-management education concepts discussed, opportunities for ongoing education and support, and recommendations provided today.    Plan  Continue with positive diet & lifestyle changes    Needs refills on ozempic and metformin, will route to PCP   Follow-up: Friday 2/21 phone, PCP 2/24    See Care Plan for co-developed, patient-state behavior change goals.    Education Materials Provided:  Healthy Living with Diabetes book   American Diabetes Association; How to Thrive: A Guide for Your Journey with Diabetes.     Subjective/Objective  Ana is an 29 year old year old, presenting for the following diabetes education related to: Diabetes education in the past 24mo: (Patient-Rptd) No  Diabetes type: (Patient-Rptd) Type 2  Disease course: (Patient-Rptd) Getting harder to manage  How confident are you filling out medical forms by yourself:: (Patient-Rptd) Quite a bit  Cultural Influences/Ethnic Background:  Not  or     Diabetes Symptoms & Complications:  Diabetes Related Symptoms: (Patient-Rptd) Fatigue, Polydipsia (increased thirst), Polyphagia (increased hunger)  Weight trend: (Patient-Rptd) Increasing  Symptom course: (Patient-Rptd) Worsening  Disease course: (Patient-Rptd) Getting harder to manage     Patient Problem List and Family Medical History reviewed for relevant medical history, current medical status, and diabetes risk  "factors.    Vitals:  Estimated body mass index is 40.88 kg/m  as calculated from the following:    Height as of 10/24/24: 1.702 m (5' 7\").    Weight as of 10/24/24: 118.4 kg (261 lb).   Last 3 BP:   BP Readings from Last 3 Encounters:   10/24/24 127/80   11/13/23 118/86   10/27/23 122/86     History   Smoking Status     Never   Smokeless Tobacco     Never     Labs:  Lab Results   Component Value Date     10/24/2024     07/31/2019     HDL Cholesterol   Date Value Ref Range Status   07/31/2019 37 (L) >49 mg/dL Final     Direct Measure HDL   Date Value Ref Range Status   10/24/2024 34 (L) >=50 mg/dL Final   ]  GFR Estimate   Date Value Ref Range Status   10/24/2024 >90 >60 mL/min/1.73m2 Final     Comment:     eGFR calculated using 2021 CKD-EPI equation.   12/16/2017 >90 >60 mL/min/1.7m2 Final     Comment:     Non  GFR Calc     Lab Results   Component Value Date    CR 0.77 10/24/2024    CR 0.66 12/16/2017     Healthy Eating:  Cultural/Tenriism diet restrictions?: (Patient-Rptd) No  How many times a week on average do you eat food made away from home (restaurant/take-out)?: (Patient-Rptd) 1  Meals include: (Patient-Rptd) Lunch, Dinner, Morning Snack, Evening Snack  Beverages: (Patient-Rptd) Water, Tea, Coffee    Being Active:  Barrier to exercise: (Patient-Rptd) Time    Monitoring:  Blood Glucose Meter: (Patient-Rptd) Accu-chek  Times checking blood sugar at home (number): (Patient-Rptd) 2  Times checking blood sugar at home (per): (Patient-Rptd) Day    Taking Medications:  Current Treatments: (Patient-Rptd) Insulin Injections, Oral Medication (taken by mouth)    Problem Solving:  Yes      Reducing Risks:  Has dilated eye exam at least once a year?: (Patient-Rptd) Yes  Sees dentist every 6 months?: (Patient-Rptd) No  Feet checked by healthcare provider in the last year?: (Patient-Rptd) Yes    Healthy Coping:  Informal Support system:: (Patient-Rptd) Spouse    Glenis White, RD  Time " Spent: 41 minutes  Encounter Type: Individual    Any diabetes medication dose [facility] under the patient's referring provider.

## 2025-01-22 ENCOUNTER — MYC MEDICAL ADVICE (OUTPATIENT)
Dept: FAMILY MEDICINE | Facility: CLINIC | Age: 30
End: 2025-01-22
Payer: COMMERCIAL

## 2025-01-22 DIAGNOSIS — Z32.01 PREGNANCY TEST POSITIVE: Primary | ICD-10-CM

## 2025-01-22 NOTE — TELEPHONE ENCOUNTER
See 1/21 refill encounter.     Routed update to provider under refill encounter.     Closing this mychart encounter.

## 2025-01-24 ENCOUNTER — LAB (OUTPATIENT)
Dept: LAB | Facility: CLINIC | Age: 30
End: 2025-01-24
Payer: COMMERCIAL

## 2025-01-24 DIAGNOSIS — E66.01 MORBID OBESITY (H): Primary | ICD-10-CM

## 2025-01-24 DIAGNOSIS — Z32.01 PREGNANCY TEST POSITIVE: ICD-10-CM

## 2025-01-24 LAB
EST. AVERAGE GLUCOSE BLD GHB EST-MCNC: 137 MG/DL
HBA1C MFR BLD: 6.4 % (ref 0–5.6)
HCG UR QL: NEGATIVE

## 2025-01-24 PROCEDURE — 36415 COLL VENOUS BLD VENIPUNCTURE: CPT

## 2025-01-24 PROCEDURE — 81025 URINE PREGNANCY TEST: CPT

## 2025-01-24 PROCEDURE — 83036 HEMOGLOBIN GLYCOSYLATED A1C: CPT

## 2025-02-21 ENCOUNTER — VIRTUAL VISIT (OUTPATIENT)
Dept: EDUCATION SERVICES | Facility: CLINIC | Age: 30
End: 2025-02-21
Payer: COMMERCIAL

## 2025-02-21 DIAGNOSIS — E11.9 TYPE 2 DIABETES MELLITUS WITHOUT COMPLICATION, WITHOUT LONG-TERM CURRENT USE OF INSULIN (H): Primary | ICD-10-CM

## 2025-02-21 PROCEDURE — 98966 PH1 ASSMT&MGMT NQHP 5-10: CPT | Mod: 93 | Performed by: DIETITIAN, REGISTERED

## 2025-02-21 NOTE — PROGRESS NOTES
Diabetes Education Follow-up    Subjective/Objective:  Type of Service: Telephone Visit  Originating Location (Patient Location): Home  Distant Location (Provider Location): Offsite  Mode of Communication:  Telephone  Telephone Visit Start Time:  201  Telephone Visit End Time (telephone visit stop time): 211  How would patient like to obtain AVS? MyChart    Diabetes is being managed with Lifestyle (diet/activity), Diabetes Medications   Diabetes Medication(s)       Biguanides       metFORMIN (GLUCOPHAGE XR) 500 MG 24 hr tablet Take 1 tablet (500 mg) by mouth daily (with dinner).       Incretin Mimetic Agents       semaglutide (OZEMPIC) 2 MG/3ML pen Inject 0.25 mg subcutaneously every 7 days.     Semaglutide, 1 MG/DOSE, (OZEMPIC) 4 MG/3ML pen Inject 1 mg subcutaneously every 7 days.            Lab Results   Component Value Date    A1C 6.4 01/24/2025    A1C 7.4 10/24/2024    A1C 6.6 11/02/2023    A1C 5.8 08/10/2022    A1C 5.5 05/08/2018    A1C 5.9 05/02/2016    A1C 5.5 12/30/2013         Assessment:  Follow-up with Ana;  A1c much improved and in goal at 6.4.  Continue to aim or aggressive control and even getting A1c back into the 5s.  Discussed aggressive glycemic management for early intervention in Type 2 Diabetes.   Overall is doing very well with the Ozempic and will follow up with PCP in a few days for review.  Has a glucometer and tests occasionally.  Reviewed use and role of blood sugar check in early Diabetes; intermittent checking to assure blood sugars are staying stable and in range is beneficial and occasionally post meals. No negative GI side effects from the ozempic.  Is eating a balanced diet with high fiber, complex carbohydrates and a good balance of macronutrients.  Continue with positive diet & lifestyle changes        Plan/Response:  Continue with positive diet & lifestyle changes    Ozempic dose adjustment / titration as needed   Follow up on MyChart or with Diabetes ed apt anytime      Glenis White RD, LD, [facility]  Diabetes Education  Time spent: 10 minutes     Any diabetes medication dose changes were made via the [facility] Standing Orders under the patient's referring provider.

## 2025-02-21 NOTE — Clinical Note
2/21/2025         RE: Ana Mauricio  [address] [facility] [id number]        Dear Colleague,    Thank you for referring your patient, Ana Mauricio, to the [facility] [place]. Please see a copy of my visit note below.    Taking diabetes medications? yes:     Diabetes Medication(s)       Incretin Mimetic Agents       semaglutide (OZEMPIC) 2 MG/3ML pen Inject 0.25 mg subcutaneously every 7 days.            Lab Results   Component Value Date    A1C 6.4 01/24/2025    A1C 7.4 10/24/2024    A1C 6.6 11/02/2023    A1C 5.8 08/10/2022    A1C 5.5 05/08/2018    A1C 5.9 05/02/2016    A1C 5.5 12/30/2013       Wt Readings from Last 5 Encounters:   10/24/24 118.4 kg (261 lb)   11/13/23 114.7 kg (252 lb 12.8 oz)   10/27/23 116.6 kg (257 lb)   06/05/23 116.1 kg (256 lb)   01/23/23 114 kg (251 lb 4.8 oz)       251# - down no change recently    Hungrier     Everything has been going well   Headaches here an there     Hasn't been testing much lately     Already eating a lot of fiber   No gi side effects       Diabetes Education Follow-up    Subjective/Objective:  Type of Service: Telephone Visit  Originating Location (Patient Location): Home  Distant Location (Provider Location): Offsite  Mode of Communication:  Telephone  Telephone Visit Start Time:  201  Telephone Visit End Time (telephone visit stop time): 211  How would patient like to obtain AVS? MyChart    Diabetes is being managed with Lifestyle (diet/activity), Diabetes Medications   Diabetes Medication(s)       Biguanides       metFORMIN (GLUCOPHAGE XR) 500 MG 24 hr tablet Take 1 tablet (500 mg) by mouth daily (with dinner).       Incretin Mimetic Agents       semaglutide (OZEMPIC) 2 MG/3ML pen Inject 0.25 mg subcutaneously every 7 days.     Semaglutide, 1 MG/DOSE, (OZEMPIC) 4 MG/3ML pen Inject 1 mg subcutaneously every 7 days.            Lab Results   Component Value Date    A1C 6.4 01/24/2025    A1C 7.4 10/24/2024    A1C 6.6  11/02/2023    A1C 5.8 08/10/2022    A1C 5.5 05/08/2018    A1C 5.9 05/02/2016    A1C 5.5 12/30/2013         Assessment:  Follow-up with Ana;  A1c much improved and in goal at 6.4.  Continue to aim or aggressive contorl and even getting A1c back into the 5s.  Discussed aggressive glycemic management for early intervention in Type 2 Diabetes.   Overall is doing very well with the Ozempic and will follow up with PCP in a few days for review.  Has a glucometer and tests occasionally.  Reviewed use and role of blood sugar check in early Diabetes; intermittent checking to assure blood sugars are staying stable and in range is beneficial and occasionally post meals. No negative GI side effects from the ozempic.  Is eating a balanced diet with high fiber, complex carbohydrates and a good balance of macronutrients.  Continue with positive diet & lifestyle changes        Plan/Response:  Continue with positive diet & lifestyle changes    Ozempic dose adjustment / titration as needed   Follow up on MyChart or with Diabetes ed apt anytime     Glenis White RD, LD, [facility]  Diabetes Education  Time spent: 10 minutes     Any diabetes medication dose changes were made via the [facility] Standing Orders under the patient's referring provider.

## 2025-02-24 ENCOUNTER — OFFICE VISIT (OUTPATIENT)
Dept: FAMILY MEDICINE | Facility: CLINIC | Age: 30
End: 2025-02-24
Payer: COMMERCIAL

## 2025-02-24 VITALS
WEIGHT: 252 LBS | BODY MASS INDEX: 39.55 KG/M2 | DIASTOLIC BLOOD PRESSURE: 86 MMHG | SYSTOLIC BLOOD PRESSURE: 121 MMHG | HEART RATE: 74 BPM | RESPIRATION RATE: 16 BRPM | OXYGEN SATURATION: 100 % | TEMPERATURE: 97.9 F | HEIGHT: 67 IN

## 2025-02-24 DIAGNOSIS — E11.9 TYPE 2 DIABETES MELLITUS WITHOUT COMPLICATION, WITHOUT LONG-TERM CURRENT USE OF INSULIN (H): ICD-10-CM

## 2025-02-24 PROCEDURE — 99213 OFFICE O/P EST LOW 20 MIN: CPT | Performed by: FAMILY MEDICINE

## 2025-02-24 PROCEDURE — 3079F DIAST BP 80-89 MM HG: CPT | Performed by: FAMILY MEDICINE

## 2025-02-24 PROCEDURE — 3074F SYST BP LT 130 MM HG: CPT | Performed by: FAMILY MEDICINE

## 2025-02-24 RX ORDER — METFORMIN HYDROCHLORIDE 500 MG/1
500 TABLET, EXTENDED RELEASE ORAL
Qty: 90 TABLET | Refills: 1 | Status: SHIPPED | OUTPATIENT
Start: 2025-02-24

## 2025-02-24 RX ORDER — METFORMIN HYDROCHLORIDE 500 MG/1
TABLET, EXTENDED RELEASE ORAL
COMMUNITY
Start: 2025-01-09 | End: 2025-02-24

## 2025-02-24 NOTE — PROGRESS NOTES
"  Assessment & Plan   Works in the colorectal surgery .     (E11.9) Type 2 diabetes mellitus without complication, without long-term current use of insulin (H)  Comment: doing very well , continuing with the diet and exercise .  Plan: metFORMIN (GLUCOPHAGE XR) 500 MG 24 hr tablet,         Semaglutide, 1 MG/DOSE, (OZEMPIC) 4 MG/3ML pen              Dilcia Perez is a 29 year old, presenting for the following health issues:  Diabetes        2/24/2025     5:05 PM   Additional Questions   Roomed by Robert Austin   Accompanied by self     History of Present Illness       Diabetes:   She presents for follow up of diabetes.  She is checking home blood glucose a few times a month.   She checks blood glucose after meals and at bedtime.  Blood glucose is never over 200 and never under 70. She is aware of hypoglycemia symptoms including dizziness.   She is concerned about other.    She is not experiencing numbness or burning in feet, excessive thirst, blurry vision, weight changes or redness, sores or blisters on feet.           She eats 0-1 servings of fruits and vegetables daily.She consumes 1 sweetened beverage(s) daily.She exercises with enough effort to increase her heart rate 9 or less minutes per day.  She exercises with enough effort to increase her heart rate 3 or less days per week. She is missing 1 dose(s) of medications per week.  She is not taking prescribed medications regularly due to remembering to take.           Review of Systems  CONSTITUTIONAL: NEGATIVE for fever, chills, change in weight  ENT/MOUTH: NEGATIVE for ear, mouth and throat problems  RESP: NEGATIVE for significant cough or SOB  CV: NEGATIVE for chest pain, palpitations or peripheral edema      Objective    /86 (BP Location: Right arm, Patient Position: Chair, Cuff Size: Adult Large)   Pulse 74   Temp 97.9  F (36.6  C) (Oral)   Resp 16   Ht 1.702 m (5' 7\")   Wt 114.3 kg (252 lb)   LMP 02/07/2025   SpO2 100%   Breastfeeding No   " BMI 39.47 kg/m    Body mass index is 39.47 kg/m .  Physical Exam   GENERAL: alert and no distress  RESP: lungs clear to auscultation - no rales, rhonchi or wheezes  CV: regular rate and rhythm, normal S1 S2, no S3 or S4, no murmur, click or rub, no peripheral edema  ABDOMEN: soft, nontender, no hepatosplenomegaly, no masses and bowel sounds normal  MS: no gross musculoskeletal defects noted, no edema  SKIN: no suspicious lesions or rashes  NEURO: Normal strength and tone, mentation intact and speech normal  PSYCH: mentation appears normal, affect normal/bright          Signed Electronically by: Joie Shepherd MD

## 2025-03-04 NOTE — PATIENT INSTRUCTIONS
Keep up the great work!   A1c came back down very well.       Blood finger sticks intermittently  just to make sure they are in normal/usual ranges (even if just 1-2 times a month)   Continuous glucose monitoring is always an option if ever curious to do a very detailed assessment into blood sugars without finger stick (can do a free trial of FreeStyle Yvette )     https://Neokinetics.org/type-2-diabetes   ([employer] is a good website to stay up to date on diabetes news, research, new products, technology as well as resources for diet etc).       A Diabetes Education referral is good for a year and your provider can easily add another anytime.  We are here to help!  There are many areas a Diabetes Educator can help with including -  medication regimen review and adjustment, new medications, blood sugars, new technology, new meters, activity, food/diet, goal setting etc.       How to get a hold of us: Please reach out on [employer] anytime with questions.  Our direct scheduling line for appointments is: [phone].  We have virtual and in person options at many locations.  Additionally we have a non urgent M-F triage line for questions or to get a hold of a Diabetes educator : [phone]

## 2025-04-24 DIAGNOSIS — E11.9 TYPE 2 DIABETES MELLITUS WITHOUT COMPLICATION, WITHOUT LONG-TERM CURRENT USE OF INSULIN (H): ICD-10-CM

## 2025-04-24 RX ORDER — SEMAGLUTIDE 1.34 MG/ML
INJECTION, SOLUTION SUBCUTANEOUS
Qty: 3 ML | Refills: 1 | Status: SHIPPED | OUTPATIENT
Start: 2025-04-24

## 2025-05-10 ENCOUNTER — HEALTH MAINTENANCE LETTER (OUTPATIENT)
Age: 30
End: 2025-05-10

## 2025-08-21 DIAGNOSIS — E11.9 TYPE 2 DIABETES MELLITUS WITHOUT COMPLICATION, WITHOUT LONG-TERM CURRENT USE OF INSULIN (H): ICD-10-CM

## 2025-08-21 RX ORDER — METFORMIN HYDROCHLORIDE 500 MG/1
500 TABLET, EXTENDED RELEASE ORAL
Qty: 90 TABLET | Refills: 0 | Status: SHIPPED | OUTPATIENT
Start: 2025-08-21